# Patient Record
Sex: FEMALE | Race: OTHER | HISPANIC OR LATINO | URBAN - METROPOLITAN AREA
[De-identification: names, ages, dates, MRNs, and addresses within clinical notes are randomized per-mention and may not be internally consistent; named-entity substitution may affect disease eponyms.]

---

## 2018-09-02 ENCOUNTER — INPATIENT (INPATIENT)
Facility: HOSPITAL | Age: 73
LOS: 3 days | Discharge: HOME | End: 2018-09-06
Attending: INTERNAL MEDICINE | Admitting: INTERNAL MEDICINE
Payer: MEDICARE

## 2018-09-02 VITALS
RESPIRATION RATE: 16 BRPM | SYSTOLIC BLOOD PRESSURE: 101 MMHG | DIASTOLIC BLOOD PRESSURE: 56 MMHG | OXYGEN SATURATION: 96 % | HEART RATE: 68 BPM | TEMPERATURE: 99 F

## 2018-09-02 DIAGNOSIS — Z98.890 OTHER SPECIFIED POSTPROCEDURAL STATES: Chronic | ICD-10-CM

## 2018-09-02 DIAGNOSIS — I21.4 NON-ST ELEVATION (NSTEMI) MYOCARDIAL INFARCTION: ICD-10-CM

## 2018-09-02 DIAGNOSIS — Z29.9 ENCOUNTER FOR PROPHYLACTIC MEASURES, UNSPECIFIED: ICD-10-CM

## 2018-09-02 DIAGNOSIS — I10 ESSENTIAL (PRIMARY) HYPERTENSION: ICD-10-CM

## 2018-09-02 DIAGNOSIS — R07.9 CHEST PAIN, UNSPECIFIED: ICD-10-CM

## 2018-09-02 LAB
ALBUMIN SERPL ELPH-MCNC: 3.7 G/DL — SIGNIFICANT CHANGE UP (ref 3.3–5)
ALP SERPL-CCNC: 75 U/L — SIGNIFICANT CHANGE UP (ref 40–120)
ALT FLD-CCNC: 26 U/L — SIGNIFICANT CHANGE UP (ref 4–33)
APTT BLD: 28 SEC — SIGNIFICANT CHANGE UP (ref 27.5–37.4)
AST SERPL-CCNC: 41 U/L — HIGH (ref 4–32)
BASOPHILS # BLD AUTO: 0.04 K/UL — SIGNIFICANT CHANGE UP (ref 0–0.2)
BASOPHILS NFR BLD AUTO: 0.4 % — SIGNIFICANT CHANGE UP (ref 0–2)
BILIRUB SERPL-MCNC: 0.3 MG/DL — SIGNIFICANT CHANGE UP (ref 0.2–1.2)
BUN SERPL-MCNC: 17 MG/DL — SIGNIFICANT CHANGE UP (ref 7–23)
CALCIUM SERPL-MCNC: 9.4 MG/DL — SIGNIFICANT CHANGE UP (ref 8.4–10.5)
CHLORIDE SERPL-SCNC: 94 MMOL/L — LOW (ref 98–107)
CK MB BLD-MCNC: 18.99 NG/ML — HIGH (ref 1–4.7)
CK MB BLD-MCNC: 4.4 — HIGH (ref 0–2.5)
CK SERPL-CCNC: 435 U/L — HIGH (ref 25–170)
CO2 SERPL-SCNC: 20 MMOL/L — LOW (ref 22–31)
CREAT SERPL-MCNC: 0.76 MG/DL — SIGNIFICANT CHANGE UP (ref 0.5–1.3)
EOSINOPHIL # BLD AUTO: 0.07 K/UL — SIGNIFICANT CHANGE UP (ref 0–0.5)
EOSINOPHIL NFR BLD AUTO: 0.7 % — SIGNIFICANT CHANGE UP (ref 0–6)
GLUCOSE SERPL-MCNC: 107 MG/DL — HIGH (ref 70–99)
HCT VFR BLD CALC: 37 % — SIGNIFICANT CHANGE UP (ref 34.5–45)
HGB BLD-MCNC: 12.8 G/DL — SIGNIFICANT CHANGE UP (ref 11.5–15.5)
IMM GRANULOCYTES # BLD AUTO: 0.03 # — SIGNIFICANT CHANGE UP
IMM GRANULOCYTES NFR BLD AUTO: 0.3 % — SIGNIFICANT CHANGE UP (ref 0–1.5)
INR BLD: 0.98 — SIGNIFICANT CHANGE UP (ref 0.88–1.17)
LYMPHOCYTES # BLD AUTO: 2.38 K/UL — SIGNIFICANT CHANGE UP (ref 1–3.3)
LYMPHOCYTES # BLD AUTO: 25 % — SIGNIFICANT CHANGE UP (ref 13–44)
MCHC RBC-ENTMCNC: 32.2 PG — SIGNIFICANT CHANGE UP (ref 27–34)
MCHC RBC-ENTMCNC: 34.6 % — SIGNIFICANT CHANGE UP (ref 32–36)
MCV RBC AUTO: 93.2 FL — SIGNIFICANT CHANGE UP (ref 80–100)
MONOCYTES # BLD AUTO: 0.93 K/UL — HIGH (ref 0–0.9)
MONOCYTES NFR BLD AUTO: 9.8 % — SIGNIFICANT CHANGE UP (ref 2–14)
NEUTROPHILS # BLD AUTO: 6.07 K/UL — SIGNIFICANT CHANGE UP (ref 1.8–7.4)
NEUTROPHILS NFR BLD AUTO: 63.8 % — SIGNIFICANT CHANGE UP (ref 43–77)
NRBC # FLD: 0 — SIGNIFICANT CHANGE UP
PLATELET # BLD AUTO: 206 K/UL — SIGNIFICANT CHANGE UP (ref 150–400)
PMV BLD: 11.1 FL — SIGNIFICANT CHANGE UP (ref 7–13)
POTASSIUM SERPL-MCNC: 3.6 MMOL/L — SIGNIFICANT CHANGE UP (ref 3.5–5.3)
POTASSIUM SERPL-SCNC: 3.6 MMOL/L — SIGNIFICANT CHANGE UP (ref 3.5–5.3)
PROT SERPL-MCNC: 7.5 G/DL — SIGNIFICANT CHANGE UP (ref 6–8.3)
PROTHROM AB SERPL-ACNC: 11.3 SEC — SIGNIFICANT CHANGE UP (ref 9.8–13.1)
RBC # BLD: 3.97 M/UL — SIGNIFICANT CHANGE UP (ref 3.8–5.2)
RBC # FLD: 13.9 % — SIGNIFICANT CHANGE UP (ref 10.3–14.5)
SODIUM SERPL-SCNC: 128 MMOL/L — LOW (ref 135–145)
TROPONIN T, HIGH SENSITIVITY: 256 NG/L — CRITICAL HIGH (ref ?–14)
TROPONIN T, HIGH SENSITIVITY: 275 NG/L — CRITICAL HIGH (ref ?–14)
WBC # BLD: 9.52 K/UL — SIGNIFICANT CHANGE UP (ref 3.8–10.5)
WBC # FLD AUTO: 9.52 K/UL — SIGNIFICANT CHANGE UP (ref 3.8–10.5)

## 2018-09-02 PROCEDURE — 71045 X-RAY EXAM CHEST 1 VIEW: CPT | Mod: 26

## 2018-09-02 PROCEDURE — 93010 ELECTROCARDIOGRAM REPORT: CPT

## 2018-09-02 RX ORDER — ASPIRIN/CALCIUM CARB/MAGNESIUM 324 MG
162 TABLET ORAL ONCE
Refills: 0 | Status: COMPLETED | OUTPATIENT
Start: 2018-09-02 | End: 2018-09-02

## 2018-09-02 RX ORDER — LOSARTAN POTASSIUM 100 MG/1
50 TABLET, FILM COATED ORAL DAILY
Refills: 0 | Status: DISCONTINUED | OUTPATIENT
Start: 2018-09-02 | End: 2018-09-06

## 2018-09-02 RX ORDER — FOLIC ACID 0.8 MG
1 TABLET ORAL DAILY
Refills: 0 | Status: DISCONTINUED | OUTPATIENT
Start: 2018-09-02 | End: 2018-09-06

## 2018-09-02 RX ORDER — HEPARIN SODIUM 5000 [USP'U]/ML
4700 INJECTION INTRAVENOUS; SUBCUTANEOUS ONCE
Refills: 0 | Status: COMPLETED | OUTPATIENT
Start: 2018-09-02 | End: 2018-09-02

## 2018-09-02 RX ORDER — HEPARIN SODIUM 5000 [USP'U]/ML
INJECTION INTRAVENOUS; SUBCUTANEOUS
Qty: 25000 | Refills: 0 | Status: DISCONTINUED | OUTPATIENT
Start: 2018-09-02 | End: 2018-09-04

## 2018-09-02 RX ORDER — ACETAMINOPHEN 500 MG
650 TABLET ORAL EVERY 6 HOURS
Refills: 0 | Status: DISCONTINUED | OUTPATIENT
Start: 2018-09-02 | End: 2018-09-06

## 2018-09-02 RX ORDER — ACETAMINOPHEN 500 MG
975 TABLET ORAL ONCE
Refills: 0 | Status: DISCONTINUED | OUTPATIENT
Start: 2018-09-02 | End: 2018-09-02

## 2018-09-02 RX ORDER — TICAGRELOR 90 MG/1
180 TABLET ORAL ONCE
Refills: 0 | Status: COMPLETED | OUTPATIENT
Start: 2018-09-02 | End: 2018-09-02

## 2018-09-02 RX ORDER — HEPARIN SODIUM 5000 [USP'U]/ML
4700 INJECTION INTRAVENOUS; SUBCUTANEOUS EVERY 6 HOURS
Refills: 0 | Status: DISCONTINUED | OUTPATIENT
Start: 2018-09-02 | End: 2018-09-04

## 2018-09-02 RX ORDER — NITROGLYCERIN 6.5 MG
0.5 CAPSULE, EXTENDED RELEASE ORAL ONCE
Refills: 0 | Status: COMPLETED | OUTPATIENT
Start: 2018-09-02 | End: 2018-09-03

## 2018-09-02 RX ADMIN — HEPARIN SODIUM 4700 UNIT(S): 5000 INJECTION INTRAVENOUS; SUBCUTANEOUS at 17:19

## 2018-09-02 RX ADMIN — HEPARIN SODIUM 950 UNIT(S)/HR: 5000 INJECTION INTRAVENOUS; SUBCUTANEOUS at 17:19

## 2018-09-02 RX ADMIN — TICAGRELOR 180 MILLIGRAM(S): 90 TABLET ORAL at 18:40

## 2018-09-02 RX ADMIN — Medication 162 MILLIGRAM(S): at 16:54

## 2018-09-02 NOTE — ED PROVIDER NOTE - NS ED ROS FT
Constitutional: no fever  Eyes: no conjunctivitis  Ears: no ear pain   Nose: no nasal congestion, Mouth/Throat: no throat pain, Neck: no stiffness  Cardiovascular: + chest pain +sob   Chest: no cough  Gastrointestinal: no abdominal pain, no vomiting and diarrhea  MSK: no joint pain  : no dysuria  Skin: no rash  Neuro: no LOC

## 2018-09-02 NOTE — H&P ADULT - ASSESSMENT
71 y/o female with PMHx of HTN presenting to the ED with progressively worsening midsternal chest pressure on exertion for 3 weeks acutely worse last night. Admit to telemetry for NSTEMI.

## 2018-09-02 NOTE — CONSULT NOTE ADULT - SUBJECTIVE AND OBJECTIVE BOX
Date of Admission: 9/2/2018    CHIEF COMPLAINT: exertional chest pain X 3 weeks    HISTORY OF PRESENT ILLNESS: 72F with HTN presents with 3 weeks of exertional chest pain associated with intermittent dizziness. Patient has been experiencing chest pain with minimal exertion over last few weeks, climbing stairs, walking short distance. Pain is relieved with rest. Today her pain worsened and in addition to some dizziness she felt some associated L arm discomfort. In ER patient is painfree at rest but EKG with TWI in inferior and lateral leads (no EKG available for comparison). Troponin is also elevated to 275. Treated with  mg and Heparin gtt. Cardiology is consulted with concern for NSTEMI.      Allergies    No Known Allergies    Intolerances    	    MEDICATIONS:  heparin  Infusion.  Unit(s)/Hr IV Continuous <Continuous>  heparin  Injectable 4700 Unit(s) IV Push every 6 hours PRN  ticagrelor 180 milliGRAM(s) Oral once    PAST MEDICAL & SURGICAL HISTORY:  HTN    FAMILY HISTORY:      SOCIAL HISTORY:    Smoker  denies  Alcohol denies  Drugs denies      REVIEW OF SYSTEMS:  See HPI. Otherwise, 10 point ROS done and otherwise negative.    PHYSICAL EXAM:  T(C): 37.1 (09-02-18 @ 14:49), Max: 37.1 (09-02-18 @ 14:49)  HR: 70 (09-02-18 @ 15:49) (68 - 83)  BP: 126/69 (09-02-18 @ 15:49) (101/56 - 126/69)  RR: 18 (09-02-18 @ 15:49) (16 - 18)  SpO2: 99% (09-02-18 @ 15:49) (96% - 99%)  Wt(kg): --  I&O's Summary      Appearance: Normal	  HEENT:   Normal oral mucosa, PERRL, EOMI	  Cardiovascular: Normal S1 S2,  Respiratory: Lungs clear to auscultation	  Psychiatry: A & O x 3  Gastrointestinal:  Soft, Non-tender, + BS	  Skin: No rashes, No ecchymoses, No cyanosis	  Neurologic: Non-focal  Extremities: Normal range of motion, No clubbing, cyanosis or edema          LABS:	 	                        12.8   9.52  )-----------( 206      ( 02 Sep 2018 15:45 )             37.0       09-02    128<L>  |  94<L>  |  17  ----------------------------<  107<H>  3.6   |  20<L>  |  0.76    Ca    9.4      02 Sep 2018 15:45    TPro  7.5  /  Alb  3.7  /  TBili  0.3  /  DBili  x   /  AST  41<H>  /  ALT  26  /  AlkPhos  75  09-02      CARDIAC MARKERS:  Troponin T, High Sensitivity: 275 ng/L (09-02-18 @ 15:45)    TELEMETRY: 	NSR      ECG:  NSR with TWI inferior and lateral leads

## 2018-09-02 NOTE — H&P ADULT - HISTORY OF PRESENT ILLNESS
71 y/o female with PMHx of HTN presenting to the ED with progressively worsening midsternal chest pressure on exertion for 3 weeks acutely worse last night as she was walking up a flight of stairs. Patient reports that chest pain is only felt on exertion intermittently and is improved with rest. She took 325mg aspirin this morning with mild relief in symptoms. Pain radiates down left arm and is associated with lightheadedness. She denies fever, chills, headache, blurred vision, palpitations, shortness of breath, back pain, abdominal pain, nausea, vomiting, diaphoresis, dysuria, calf pain. Of note, patient reports feeling muscle cramping in b/l legs at night once in a while. Routine stress test was done 1.5 years ago which was unremarkable. Patient is currently lying in bed comfortably and denies chest pain.  In the ED, hsTrop noted to be elevated (275) and patient was started on a heparin gtt and ASA/Brilinta for concern for ACS. 73 y/o female with PMHx of HTN presenting to the ED with progressively worsening midsternal chest pressure on exertion for 3 weeks acutely worse last night as she was walking up a flight of stairs. Patient reports that chest pain is only felt on exertion intermittently and is improved with rest. She took 325mg aspirin this morning with mild relief in symptoms. Pain radiates down left arm and is associated with lightheadedness. She denies fever, chills, headache, blurred vision, palpitations, shortness of breath, back pain, abdominal pain, nausea, vomiting, diaphoresis, dysuria, calf pain. Of note, patient reports feeling muscle cramping in b/l legs at night once in a while. Routine stress test was done 1.5 years ago which was unremarkable. Patient is currently lying in bed comfortably and denies chest pain.  In the ED, EKG showing NSR @ 76bpm, TWI in inferior and lateral leads (no EKG available for comparison). hsTrop noted to be elevated (275) and patient was started on a heparin gtt and ASA/Brilinta for concern for ACS.

## 2018-09-02 NOTE — H&P ADULT - MUSCULOSKELETAL
ROM intact/no joint swelling/no joint erythema/no joint warmth/no calf tenderness detailed exam details…

## 2018-09-02 NOTE — H&P ADULT - PROBLEM SELECTOR PLAN 1
Admit to telemetry, serial cardiac enzymes, serial EKGs  Check CBC, CMP, TSH, FLP, HgA1C  Continue Losartan and HCTZ  F/U MD note Admit to telemetry, serial cardiac enzymes, serial EKGs  Check CBC, CMP, TSH, FLP, HgA1C  Echocardiogram ordered  Consider ischemic eval  Continue Losartan and HCTZ  F/U MD note

## 2018-09-02 NOTE — ED PROVIDER NOTE - PHYSICAL EXAMINATION
gen: well appearing  Mentation: AAO x 3  psych: mood appropriate  ENT: airway patent  Eyes: conjunctivae clear bilaterally  Cardio: RRR, no m/r/g  Resp: normal BS b/l  GI: s/nt/nd   Neuro: AAO x 3, sensation and motor function intact, CN 2-12 intact  Skin: No evidence of rash  MSK: normal movement of all extremities gen: well appearing  Mentation: AAO x 3  psych: mood appropriate  ENT: airway patent  Eyes: conjunctivae clear bilaterally  Cardio: RRR, no m/r/g  Resp: normal BS b/l  GI: s/nt/nd   Neuro: AAO x 3, sensation and motor function intact, CN 2-12 intact  Skin: No evidence of rash  MSK: normal movement of all extremities    ATTENDING PHYSICAL EXAM DR. Wilson ***GEN - NAD; A+O x3 ***HEAD - NC/AT ***EYES/NOSE - PERRL, EOMI, mucous membranes moist, no discharge ***THROAT: Oral cavity and pharynx normal. No inflammation, swelling, exudate, or lesions.  ***NECK: Neck supple, non-tender without lymphadenopathy, no masses, no JVD.   ***PULMONARY - CTA b/l, symmetric breath sounds. ***CARDIAC -s1s2, RRR, no M,R,G  ***ABDOMEN - +BS, ND, NT, soft, no guarding, no rebound, no masses   ***BACK - no CVA tenderness, Normal  spine ***EXTREMITIES - symmetric pulses, 2+ dp, capillary refill < 2 seconds, no clubbing, no cyanosis, no edema ***SKIN - no rash or bruising   ***NEUROLOGIC - alert, CN 2-12 intact

## 2018-09-02 NOTE — ED ADULT NURSE NOTE - NSIMPLEMENTINTERV_GEN_ALL_ED
Implemented All Universal Safety Interventions:  [place] to call system. Call bell, personal items and telephone within reach. Instruct patient to call for assistance. Room bathroom lighting operational. Non-slip footwear when patient is off stretcher. Physically safe environment: no spills, clutter or unnecessary equipment. Stretcher in lowest position, wheels locked, appropriate side rails in place.

## 2018-09-02 NOTE — ED PROVIDER NOTE - PROGRESS NOTE DETAILS
Kelsie att: 16:00 Signout to me. 16:40 Trop 275, neg active cp, vss, nstemi. Patient denies recent gi bleed or surgery. Hep gtt initiated. Cardio fellow consulted, will see, recommends admission to medicine.

## 2018-09-02 NOTE — ED ADULT NURSE NOTE - OBJECTIVE STATEMENT
Kyrie RN: received pt to room 4 for evaluation of midsternal non-radiating chest pressure x 3 weeks with dizziness and sob on exertion which worsened last night per pt. pt presents awake a&ox4, denies dizziness or ha at present. skin warm, dry, appropriate for race. respirations even, unlabored. denies cp or sob at present. abdomen soft nontender nondistended. denies n/v/d, denies fevers or chills. ivl placed bloods drawn and sent, cardiac monitor in place in nsr. family at bedside, safety maintained. report to primary RN Maicol

## 2018-09-02 NOTE — H&P ADULT - ATTENDING COMMENTS
72 year old woman with HTN presents with chest pain found to have NSTEMI.    #NSTEMI-  EKG is sinus with diffuse t-wave inversions and isolated ST elevation in lead V2.  Intermittent ventricular bigeminy on telemetry.  Repeat EKG please.   S/P ASA and Brilinta load with heparin gtt.  Will plan for cardiac cath on 9/4 unless pain is refractory to medical therapy.

## 2018-09-02 NOTE — CONSULT NOTE ADULT - ASSESSMENT
72F with HTN presents with exertional angina and associated elevated troponins, concern for NSTEMI.    D/W Dr. Zapata, cardio fellow would admit to telemetry, ACS protocol with DAPT (ASA and Brilinta), Heparin gtt. Continue to trend cardiac enzymes and serial EKGs. Check ECHO in am. Risk factor labs. Will plan for cardiac cath on Tuesday. If pain pattern changes would need to consider more urgent intervention.

## 2018-09-02 NOTE — ED PROVIDER NOTE - MEDICAL DECISION MAKING DETAILS
73 yo female presenting with chest pain; concern for acs; will obtain labs ekg cxr pain control ---> re eval

## 2018-09-02 NOTE — ED PROVIDER NOTE - OBJECTIVE STATEMENT
73 yo female presenting with progressively worsening midsternal chest pressure for 3 weeks acutely worse last night.  Worse with exertion, somewhat improved with rest.  Took 325 aspirin this morning with mild relief in symptoms.  Pain associated with dizziness and shortness of breath.  Stress test 1.5 years ago which was unremarkable.  Currently states that she does not have any pain. 71 yo female presenting with progressively worsening midsternal chest pressure for 3 weeks acutely worse last night.  Worse with exertion, somewhat improved with rest.  Took 325 aspirin this morning with mild relief in symptoms.  Pain associated with dizziness and shortness of breath.  Stress test 1.5 years ago which was unremarkable.  Currently states that she does not have any pain.  Attending - Agree with above.  I evaluated patient myself.  73 y/o F with on/off SSCP x 3 weeks, constant since last night.  Radiates to LUE.  Worse with exertion.  Associated shortness of breathe.  No recent fever, cough, or trauma.  No abd pain, n/v.  Had similar in distant past, but did not last as long.

## 2018-09-02 NOTE — H&P ADULT - RS GEN PE MLT RESP DETAILS PC
airway patent/breath sounds equal/good air movement/respirations non-labored/clear to auscultation bilaterally/no chest wall tenderness/no intercostal retractions/no rales/no rhonchi/no wheezes

## 2018-09-03 LAB
APTT BLD: 60.4 SEC — HIGH (ref 27.5–37.4)
APTT BLD: 63.7 SEC — HIGH (ref 27.5–37.4)
BUN SERPL-MCNC: 13 MG/DL — SIGNIFICANT CHANGE UP (ref 7–23)
CALCIUM SERPL-MCNC: 9 MG/DL — SIGNIFICANT CHANGE UP (ref 8.4–10.5)
CHLORIDE SERPL-SCNC: 99 MMOL/L — SIGNIFICANT CHANGE UP (ref 98–107)
CHOLEST SERPL-MCNC: 178 MG/DL — SIGNIFICANT CHANGE UP (ref 120–199)
CK MB BLD-MCNC: 15.9 NG/ML — HIGH (ref 1–4.7)
CK MB BLD-MCNC: 4.7 — HIGH (ref 0–2.5)
CK SERPL-CCNC: 335 U/L — HIGH (ref 25–170)
CK SERPL-CCNC: 347 U/L — HIGH (ref 25–170)
CO2 SERPL-SCNC: 20 MMOL/L — LOW (ref 22–31)
CREAT SERPL-MCNC: 0.72 MG/DL — SIGNIFICANT CHANGE UP (ref 0.5–1.3)
GLUCOSE SERPL-MCNC: 123 MG/DL — HIGH (ref 70–99)
HBA1C BLD-MCNC: 5.8 % — HIGH (ref 4–5.6)
HCT VFR BLD CALC: 36.4 % — SIGNIFICANT CHANGE UP (ref 34.5–45)
HCT VFR BLD CALC: 38.1 % — SIGNIFICANT CHANGE UP (ref 34.5–45)
HDLC SERPL-MCNC: 43 MG/DL — LOW (ref 45–65)
HGB BLD-MCNC: 12.4 G/DL — SIGNIFICANT CHANGE UP (ref 11.5–15.5)
HGB BLD-MCNC: 13 G/DL — SIGNIFICANT CHANGE UP (ref 11.5–15.5)
LIPID PNL WITH DIRECT LDL SERPL: 109 MG/DL — SIGNIFICANT CHANGE UP
MAGNESIUM SERPL-MCNC: 2.1 MG/DL — SIGNIFICANT CHANGE UP (ref 1.6–2.6)
MCHC RBC-ENTMCNC: 31.3 PG — SIGNIFICANT CHANGE UP (ref 27–34)
MCHC RBC-ENTMCNC: 31.5 PG — SIGNIFICANT CHANGE UP (ref 27–34)
MCHC RBC-ENTMCNC: 34.1 % — SIGNIFICANT CHANGE UP (ref 32–36)
MCHC RBC-ENTMCNC: 34.1 % — SIGNIFICANT CHANGE UP (ref 32–36)
MCV RBC AUTO: 91.8 FL — SIGNIFICANT CHANGE UP (ref 80–100)
MCV RBC AUTO: 92.4 FL — SIGNIFICANT CHANGE UP (ref 80–100)
NRBC # FLD: 0 — SIGNIFICANT CHANGE UP
NRBC # FLD: 0 — SIGNIFICANT CHANGE UP
PLATELET # BLD AUTO: 187 K/UL — SIGNIFICANT CHANGE UP (ref 150–400)
PLATELET # BLD AUTO: 190 K/UL — SIGNIFICANT CHANGE UP (ref 150–400)
PMV BLD: 11.6 FL — SIGNIFICANT CHANGE UP (ref 7–13)
PMV BLD: 11.6 FL — SIGNIFICANT CHANGE UP (ref 7–13)
POTASSIUM SERPL-MCNC: 3.5 MMOL/L — SIGNIFICANT CHANGE UP (ref 3.5–5.3)
POTASSIUM SERPL-SCNC: 3.5 MMOL/L — SIGNIFICANT CHANGE UP (ref 3.5–5.3)
RBC # BLD: 3.94 M/UL — SIGNIFICANT CHANGE UP (ref 3.8–5.2)
RBC # BLD: 4.15 M/UL — SIGNIFICANT CHANGE UP (ref 3.8–5.2)
RBC # FLD: 13.5 % — SIGNIFICANT CHANGE UP (ref 10.3–14.5)
RBC # FLD: 13.7 % — SIGNIFICANT CHANGE UP (ref 10.3–14.5)
SODIUM SERPL-SCNC: 135 MMOL/L — SIGNIFICANT CHANGE UP (ref 135–145)
TRIGL SERPL-MCNC: 174 MG/DL — HIGH (ref 10–149)
TROPONIN T, HIGH SENSITIVITY: 202 NG/L — CRITICAL HIGH (ref ?–14)
TROPONIN T, HIGH SENSITIVITY: 223 NG/L — CRITICAL HIGH (ref ?–14)
TSH SERPL-MCNC: 2.97 UIU/ML — SIGNIFICANT CHANGE UP (ref 0.27–4.2)
WBC # BLD: 7.15 K/UL — SIGNIFICANT CHANGE UP (ref 3.8–10.5)
WBC # BLD: 8.49 K/UL — SIGNIFICANT CHANGE UP (ref 3.8–10.5)
WBC # FLD AUTO: 7.15 K/UL — SIGNIFICANT CHANGE UP (ref 3.8–10.5)
WBC # FLD AUTO: 8.49 K/UL — SIGNIFICANT CHANGE UP (ref 3.8–10.5)

## 2018-09-03 PROCEDURE — 93010 ELECTROCARDIOGRAM REPORT: CPT

## 2018-09-03 RX ORDER — TICAGRELOR 90 MG/1
90 TABLET ORAL
Refills: 0 | Status: DISCONTINUED | OUTPATIENT
Start: 2018-09-03 | End: 2018-09-04

## 2018-09-03 RX ORDER — ATORVASTATIN CALCIUM 80 MG/1
40 TABLET, FILM COATED ORAL AT BEDTIME
Refills: 0 | Status: DISCONTINUED | OUTPATIENT
Start: 2018-09-03 | End: 2018-09-06

## 2018-09-03 RX ORDER — MORPHINE SULFATE 50 MG/1
0.5 CAPSULE, EXTENDED RELEASE ORAL ONCE
Refills: 0 | Status: DISCONTINUED | OUTPATIENT
Start: 2018-09-03 | End: 2018-09-03

## 2018-09-03 RX ORDER — ASPIRIN/CALCIUM CARB/MAGNESIUM 324 MG
81 TABLET ORAL DAILY
Refills: 0 | Status: DISCONTINUED | OUTPATIENT
Start: 2018-09-03 | End: 2018-09-06

## 2018-09-03 RX ADMIN — Medication 1 MILLIGRAM(S): at 13:41

## 2018-09-03 RX ADMIN — Medication 0.5 INCH(S): at 01:17

## 2018-09-03 RX ADMIN — Medication 30 MILLILITER(S): at 01:17

## 2018-09-03 RX ADMIN — Medication 0.5 INCH(S): at 13:19

## 2018-09-03 RX ADMIN — TICAGRELOR 90 MILLIGRAM(S): 90 TABLET ORAL at 18:45

## 2018-09-03 RX ADMIN — MORPHINE SULFATE 0.5 MILLIGRAM(S): 50 CAPSULE, EXTENDED RELEASE ORAL at 10:58

## 2018-09-03 RX ADMIN — Medication 650 MILLIGRAM(S): at 02:30

## 2018-09-03 RX ADMIN — Medication 1 DROP(S): at 18:44

## 2018-09-03 RX ADMIN — HEPARIN SODIUM 950 UNIT(S)/HR: 5000 INJECTION INTRAVENOUS; SUBCUTANEOUS at 01:56

## 2018-09-03 RX ADMIN — LOSARTAN POTASSIUM 50 MILLIGRAM(S): 100 TABLET, FILM COATED ORAL at 13:40

## 2018-09-03 RX ADMIN — HEPARIN SODIUM 950 UNIT(S)/HR: 5000 INJECTION INTRAVENOUS; SUBCUTANEOUS at 08:33

## 2018-09-03 RX ADMIN — Medication 1 TABLET(S): at 13:40

## 2018-09-03 RX ADMIN — MORPHINE SULFATE 0.5 MILLIGRAM(S): 50 CAPSULE, EXTENDED RELEASE ORAL at 10:43

## 2018-09-03 RX ADMIN — Medication 650 MILLIGRAM(S): at 01:17

## 2018-09-03 RX ADMIN — Medication 81 MILLIGRAM(S): at 13:40

## 2018-09-03 RX ADMIN — Medication 1 DROP(S): at 06:23

## 2018-09-03 RX ADMIN — ATORVASTATIN CALCIUM 40 MILLIGRAM(S): 80 TABLET, FILM COATED ORAL at 20:49

## 2018-09-03 NOTE — PROVIDER CONTACT NOTE (OTHER) - SITUATION
Patient states that she started to feel come chest pressure and mild chest pain immediately before being transferred to [address] [place] [address]. Describes pain as similar to what brought her in. EKG done upon arrival
pt c/o 10/10 chest pain

## 2018-09-03 NOTE — DIETITIAN INITIAL EVALUATION ADULT. - ENERGY NEEDS
Weight: 169# (77.1kg) (9/02)  Height: 62 inches  BMI: 31.1kg/m^2  IBW: 110# (50kg) +/-10%  No pressure ulcers/DTI noted per flowsheets. No edema noted.

## 2018-09-03 NOTE — CHART NOTE - NSCHARTNOTEFT_GEN_A_CORE
Called by RN pt c/o Chest pressure, Pt seen appears comfortable NAD, states she still having same Chest pressure that she had since she came in to hospital, Chest [facility]  [facility] stable, no events on tele, EKG unchanged. Pt already on Heparin qtt, will order NTG 1/2 inch to ACW, Tylenol prn, maalox prn, repeat CE w/ a labs., cardio to f/up for possible ischemic w/up Called by RN pt c/o Chest pressure, Pt seen appears comfortable NAD, states she still having same Chest pressure that she had since she came in to hospital, Chest pressure [facility]  [facility] VS stable, no events on tele, EKG unchanged. Pt already on Heparin qtt, will order NTG 1/2 inch to ACW, Tylenol prn, maalox prn, repeat CE w/ a labs., cardio to f/up for cardiac cath.

## 2018-09-03 NOTE — PROVIDER CONTACT NOTE (OTHER) - ACTION/TREATMENT ORDERED:
Ask if pain would like pain medication.    (Post provider evaluation of patient: administer Tylenol, Maalox, nitroglycerin ointment, and place of 2 liters of oxygen by nasal cannula.)
EKG, troponins,  NS 2L, 0.5mg of morphine to alleviate pain

## 2018-09-03 NOTE — DIETITIAN INITIAL EVALUATION ADULT. - NS AS NUTRI INTERV ED CONTENT
The Pt was provided with Heart Healthy diet education (low sodium, low saturated fat, increased vegetable intake) which patient verbalized comprehension of.

## 2018-09-03 NOTE — DIETITIAN INITIAL EVALUATION ADULT. - PROBLEM SELECTOR PLAN 1
Admit to telemetry, serial cardiac enzymes, serial EKGs  Check CBC, CMP, TSH, FLP, HgA1C  Echocardiogram ordered  Consider ischemic eval  Continue Losartan and HCTZ  F/U MD note

## 2018-09-03 NOTE — DIETITIAN INITIAL EVALUATION ADULT. - DIET TYPE
low sodium/consistent carbohydrate (no snacks)/DASH/TLC (sodium and cholesterol restricted diet)/regular/low fat

## 2018-09-03 NOTE — DIETITIAN INITIAL EVALUATION ADULT. - ADHERENCE
HbA1c 5.8% (9/03) - at risk for DM2, patient reports not following any diet restrictions/modifications/fair

## 2018-09-03 NOTE — DIETITIAN INITIAL EVALUATION ADULT. - NS AS NUTRI INTERV MEALS SNACK
1. Recommend Consistent Carbohydrate, DASH/TLC (cholesterol and sodium restricted) diet. 2. Please Encourage po intake, assist with meals and menu selections, provide alternatives PRN. 3. Consider multivitamin daily for micronutrient coverage. 4. Monitor weights, labs, BM's, skin integrity, p.o. intake.

## 2018-09-03 NOTE — DIETITIAN INITIAL EVALUATION ADULT. - SOURCE
Patient Malaysian speaking, utilized  service to conduct interview.  ID#[id number]. Medical record reviewed./patient

## 2018-09-03 NOTE — DIETITIAN INITIAL EVALUATION ADULT. - OTHER INFO
Patient seen for nutrition consult (assessment, education). Per chart review patient with medical history of HTN presenting to the ED with progressively worsening midsternal chest pressure on exertion for 3 weeks. Admit to telemetry for NSTEMI. Patient reports good appetite/PO intake PTA which continues in-house. NKFA. Patient does report not following diet modifications and "eats everything." Patient's HbA1c 5.8% indicating risk for DM2. Patient occastionally drinks juice but mainly water, no soda. Reports over the summer she has been eating at "[employer] and having ice cream. No reported change in weight PTA. Provided education regarding heart healthy diet and written materials in [place] provided. Patient amenable to education and verbalized understanding. No GI distress (nausea/vomiting/diarrhea/constipation.)

## 2018-09-04 LAB
APTT BLD: 27.5 SEC — SIGNIFICANT CHANGE UP (ref 27.5–37.4)
APTT BLD: 48.4 SEC — HIGH (ref 27.5–37.4)
BUN SERPL-MCNC: 16 MG/DL — SIGNIFICANT CHANGE UP (ref 7–23)
CALCIUM SERPL-MCNC: 9.2 MG/DL — SIGNIFICANT CHANGE UP (ref 8.4–10.5)
CHLORIDE SERPL-SCNC: 99 MMOL/L — SIGNIFICANT CHANGE UP (ref 98–107)
CO2 SERPL-SCNC: 19 MMOL/L — LOW (ref 22–31)
CREAT SERPL-MCNC: 0.78 MG/DL — SIGNIFICANT CHANGE UP (ref 0.5–1.3)
GLUCOSE SERPL-MCNC: 126 MG/DL — HIGH (ref 70–99)
HCT VFR BLD CALC: 37.1 % — SIGNIFICANT CHANGE UP (ref 34.5–45)
HCT VFR BLD CALC: 37.6 % — SIGNIFICANT CHANGE UP (ref 34.5–45)
HGB BLD-MCNC: 12.8 G/DL — SIGNIFICANT CHANGE UP (ref 11.5–15.5)
HGB BLD-MCNC: 12.9 G/DL — SIGNIFICANT CHANGE UP (ref 11.5–15.5)
MAGNESIUM SERPL-MCNC: 2.1 MG/DL — SIGNIFICANT CHANGE UP (ref 1.6–2.6)
MCHC RBC-ENTMCNC: 32.3 PG — SIGNIFICANT CHANGE UP (ref 27–34)
MCHC RBC-ENTMCNC: 32.6 PG — SIGNIFICANT CHANGE UP (ref 27–34)
MCHC RBC-ENTMCNC: 34.3 % — SIGNIFICANT CHANGE UP (ref 32–36)
MCHC RBC-ENTMCNC: 34.5 % — SIGNIFICANT CHANGE UP (ref 32–36)
MCV RBC AUTO: 94 FL — SIGNIFICANT CHANGE UP (ref 80–100)
MCV RBC AUTO: 94.4 FL — SIGNIFICANT CHANGE UP (ref 80–100)
NRBC # FLD: 0 — SIGNIFICANT CHANGE UP
NRBC # FLD: 0 — SIGNIFICANT CHANGE UP
PLATELET # BLD AUTO: 177 K/UL — SIGNIFICANT CHANGE UP (ref 150–400)
PLATELET # BLD AUTO: 191 K/UL — SIGNIFICANT CHANGE UP (ref 150–400)
PMV BLD: 11 FL — SIGNIFICANT CHANGE UP (ref 7–13)
PMV BLD: 11.7 FL — SIGNIFICANT CHANGE UP (ref 7–13)
POTASSIUM SERPL-MCNC: 3.5 MMOL/L — SIGNIFICANT CHANGE UP (ref 3.5–5.3)
POTASSIUM SERPL-SCNC: 3.5 MMOL/L — SIGNIFICANT CHANGE UP (ref 3.5–5.3)
RBC # BLD: 3.93 M/UL — SIGNIFICANT CHANGE UP (ref 3.8–5.2)
RBC # BLD: 4 M/UL — SIGNIFICANT CHANGE UP (ref 3.8–5.2)
RBC # FLD: 13.9 % — SIGNIFICANT CHANGE UP (ref 10.3–14.5)
RBC # FLD: 13.9 % — SIGNIFICANT CHANGE UP (ref 10.3–14.5)
SODIUM SERPL-SCNC: 135 MMOL/L — SIGNIFICANT CHANGE UP (ref 135–145)
WBC # BLD: 7.07 K/UL — SIGNIFICANT CHANGE UP (ref 3.8–10.5)
WBC # BLD: 7.79 K/UL — SIGNIFICANT CHANGE UP (ref 3.8–10.5)
WBC # FLD AUTO: 7.07 K/UL — SIGNIFICANT CHANGE UP (ref 3.8–10.5)
WBC # FLD AUTO: 7.79 K/UL — SIGNIFICANT CHANGE UP (ref 3.8–10.5)

## 2018-09-04 PROCEDURE — 93458 L HRT ARTERY/VENTRICLE ANGIO: CPT | Mod: 26

## 2018-09-04 PROCEDURE — 76937 US GUIDE VASCULAR ACCESS: CPT | Mod: 26

## 2018-09-04 PROCEDURE — 93010 ELECTROCARDIOGRAM REPORT: CPT

## 2018-09-04 RX ORDER — METOPROLOL TARTRATE 50 MG
12.5 TABLET ORAL
Refills: 0 | Status: DISCONTINUED | OUTPATIENT
Start: 2018-09-04 | End: 2018-09-04

## 2018-09-04 RX ORDER — SODIUM CHLORIDE 9 MG/ML
250 INJECTION INTRAMUSCULAR; INTRAVENOUS; SUBCUTANEOUS ONCE
Refills: 0 | Status: COMPLETED | OUTPATIENT
Start: 2018-09-04 | End: 2018-09-04

## 2018-09-04 RX ORDER — METOPROLOL TARTRATE 50 MG
25 TABLET ORAL
Refills: 0 | Status: DISCONTINUED | OUTPATIENT
Start: 2018-09-04 | End: 2018-09-06

## 2018-09-04 RX ORDER — METOPROLOL TARTRATE 50 MG
25 TABLET ORAL
Refills: 0 | Status: DISCONTINUED | OUTPATIENT
Start: 2018-09-04 | End: 2018-09-04

## 2018-09-04 RX ORDER — METOPROLOL TARTRATE 50 MG
12.5 TABLET ORAL ONCE
Refills: 0 | Status: COMPLETED | OUTPATIENT
Start: 2018-09-04 | End: 2018-09-04

## 2018-09-04 RX ADMIN — Medication 1 DROP(S): at 06:31

## 2018-09-04 RX ADMIN — TICAGRELOR 90 MILLIGRAM(S): 90 TABLET ORAL at 06:30

## 2018-09-04 RX ADMIN — Medication 12.5 MILLIGRAM(S): at 15:37

## 2018-09-04 RX ADMIN — Medication 650 MILLIGRAM(S): at 14:49

## 2018-09-04 RX ADMIN — LOSARTAN POTASSIUM 50 MILLIGRAM(S): 100 TABLET, FILM COATED ORAL at 06:30

## 2018-09-04 RX ADMIN — HEPARIN SODIUM 1100 UNIT(S)/HR: 5000 INJECTION INTRAVENOUS; SUBCUTANEOUS at 07:36

## 2018-09-04 RX ADMIN — ATORVASTATIN CALCIUM 40 MILLIGRAM(S): 80 TABLET, FILM COATED ORAL at 21:16

## 2018-09-04 RX ADMIN — Medication 650 MILLIGRAM(S): at 15:19

## 2018-09-04 RX ADMIN — Medication 25 MILLIGRAM(S): at 21:16

## 2018-09-04 RX ADMIN — Medication 1 TABLET(S): at 18:02

## 2018-09-04 RX ADMIN — Medication 1 MILLIGRAM(S): at 18:02

## 2018-09-04 RX ADMIN — SODIUM CHLORIDE 750 MILLILITER(S): 9 INJECTION INTRAMUSCULAR; INTRAVENOUS; SUBCUTANEOUS at 15:05

## 2018-09-04 RX ADMIN — Medication 81 MILLIGRAM(S): at 09:05

## 2018-09-04 RX ADMIN — Medication 1 DROP(S): at 18:02

## 2018-09-04 NOTE — CHART NOTE - NSCHARTNOTEFT_GEN_A_CORE
Patient is s/p cardiac cath. Patient without any complaints. site is stable. No hematoma. Dressing is clean/intact/dry. good Pedal pulse..  will monitor closely.

## 2018-09-04 NOTE — PROGRESS NOTE ADULT - SUBJECTIVE AND OBJECTIVE BOX
Cardiovascular Disease Progress Note    Overnight events: Chest pain yesterday evening noted. Chest pain now improved.    Otherwise review of systems negative    Objective Findings:  T(C): 36.8 (18 @ 06:28), Max: 37 (18 @ 10:00)  HR: 64 (18 @ 06:28) (64 - 85)  BP: 103/57 (18 @ 06:28) (98/67 - 109/60)  RR: 18 (18 @ 06:28) (18 - 18)  SpO2: 100% (18 @ 06:28) (95% - 100%)  Wt(kg): --  Daily     Daily Weight in k.8 (03 Sep 2018 11:11)      Physical Exam:  Gen: NAD  HEENT: EOMI  CV: RRR, normal S1 + S2, no m/r/g  Lungs: CTAB  Abd: soft, non-tender  Ext: No edema    Telemetry: Sinus; occasional PVDs    Laboratory Data:                        12.8   7.79  )-----------( 191      ( 04 Sep 2018 05:25 )             37.1     09-    135  |  99  |  13  ----------------------------<  123<H>  3.5   |  20<L>  |  0.72    Ca    9.0      03 Sep 2018 05:37  Mg     2.1         TPro  7.5  /  Alb  3.7  /  TBili  0.3  /  DBili  x   /  AST  41<H>  /  ALT  26  /  AlkPhos  75  09-02    PT/INR - ( 02 Sep 2018 15:45 )   PT: 11.3 SEC;   INR: 0.98          PTT - ( 04 Sep 2018 05:25 )  PTT:48.4 SEC  CARDIAC MARKERS ( 03 Sep 2018 10:29 )  x     / x     / 335 u/L / 15.90 ng/mL / x      CARDIAC MARKERS ( 03 Sep 2018 05:37 )  x     / x     / 347 u/L / x     / x      CARDIAC MARKERS ( 02 Sep 2018 20:00 )  x     / x     / 435 u/L / 18.99 ng/mL / x              Inpatient Medications:  MEDICATIONS  (STANDING):  artificial tears (preservative free) Ophthalmic Solution 1 Drop(s) Both EYES two times a day  aspirin  chewable 81 milliGRAM(s) Oral daily  atorvastatin 40 milliGRAM(s) Oral at bedtime  folic acid 1 milliGRAM(s) Oral daily  heparin  Infusion.  Unit(s)/Hr (9.5 mL/Hr) IV Continuous <Continuous>  hydrochlorothiazide 12.5 milliGRAM(s) Oral daily  losartan 50 milliGRAM(s) Oral daily  multivitamin 1 Tablet(s) Oral daily  ticagrelor 90 milliGRAM(s) Oral two times a day      Assessment: 72 year old woman with HTN presents with chest pain found to have NSTEMI.    #NSTEMI-  Recurrent chest pain overnight, but now improved.  Repeat EKG is sinus with diffuse t-wave inversions and grossly unchanged from admission.   S/P ASA and Brilinta load with heparin gtt.  Plan for cardiac cath today.      Care discussed with patient and grandson, via telephone.     Over 25 minutes spent on total encounter; more than 50% of the visit was spent counseling and/or coordinating care by the attending physician.      Jame Watkins MD [facility]  Cardiovascular Disease  [phone]

## 2018-09-04 NOTE — CHART NOTE - NSCHARTNOTEFT_GEN_A_CORE
Pt s/p cath today    Right femoral artery site stable, no swelling, dressing intact, foot warm to touch    Plan: continue to monitor

## 2018-09-05 ENCOUNTER — TRANSCRIPTION ENCOUNTER (OUTPATIENT)
Age: 73
End: 2018-09-05

## 2018-09-05 LAB
BUN SERPL-MCNC: 15 MG/DL — SIGNIFICANT CHANGE UP (ref 7–23)
CALCIUM SERPL-MCNC: 9.3 MG/DL — SIGNIFICANT CHANGE UP (ref 8.4–10.5)
CHLORIDE SERPL-SCNC: 99 MMOL/L — SIGNIFICANT CHANGE UP (ref 98–107)
CO2 SERPL-SCNC: 20 MMOL/L — LOW (ref 22–31)
CREAT SERPL-MCNC: 0.79 MG/DL — SIGNIFICANT CHANGE UP (ref 0.5–1.3)
GLUCOSE SERPL-MCNC: 119 MG/DL — HIGH (ref 70–99)
HCT VFR BLD CALC: 37.3 % — SIGNIFICANT CHANGE UP (ref 34.5–45)
HGB BLD-MCNC: 12.8 G/DL — SIGNIFICANT CHANGE UP (ref 11.5–15.5)
MAGNESIUM SERPL-MCNC: 2.2 MG/DL — SIGNIFICANT CHANGE UP (ref 1.6–2.6)
MCHC RBC-ENTMCNC: 32.2 PG — SIGNIFICANT CHANGE UP (ref 27–34)
MCHC RBC-ENTMCNC: 34.3 % — SIGNIFICANT CHANGE UP (ref 32–36)
MCV RBC AUTO: 94 FL — SIGNIFICANT CHANGE UP (ref 80–100)
NRBC # FLD: 0 — SIGNIFICANT CHANGE UP
PLATELET # BLD AUTO: 189 K/UL — SIGNIFICANT CHANGE UP (ref 150–400)
PMV BLD: 11.6 FL — SIGNIFICANT CHANGE UP (ref 7–13)
POTASSIUM SERPL-MCNC: 3.7 MMOL/L — SIGNIFICANT CHANGE UP (ref 3.5–5.3)
POTASSIUM SERPL-SCNC: 3.7 MMOL/L — SIGNIFICANT CHANGE UP (ref 3.5–5.3)
RBC # BLD: 3.97 M/UL — SIGNIFICANT CHANGE UP (ref 3.8–5.2)
RBC # FLD: 13.9 % — SIGNIFICANT CHANGE UP (ref 10.3–14.5)
SODIUM SERPL-SCNC: 132 MMOL/L — LOW (ref 135–145)
WBC # BLD: 7.96 K/UL — SIGNIFICANT CHANGE UP (ref 3.8–10.5)
WBC # FLD AUTO: 7.96 K/UL — SIGNIFICANT CHANGE UP (ref 3.8–10.5)

## 2018-09-05 PROCEDURE — 93306 TTE W/DOPPLER COMPLETE: CPT | Mod: 26

## 2018-09-05 RX ORDER — INFLUENZA VIRUS VACCINE 15; 15; 15; 15 UG/.5ML; UG/.5ML; UG/.5ML; UG/.5ML
0.5 SUSPENSION INTRAMUSCULAR ONCE
Refills: 0 | Status: DISCONTINUED | OUTPATIENT
Start: 2018-09-05 | End: 2018-09-06

## 2018-09-05 RX ADMIN — Medication 25 MILLIGRAM(S): at 18:39

## 2018-09-05 RX ADMIN — Medication 81 MILLIGRAM(S): at 12:38

## 2018-09-05 RX ADMIN — Medication 1 TABLET(S): at 12:38

## 2018-09-05 RX ADMIN — LOSARTAN POTASSIUM 50 MILLIGRAM(S): 100 TABLET, FILM COATED ORAL at 06:34

## 2018-09-05 RX ADMIN — Medication 1 DROP(S): at 06:34

## 2018-09-05 RX ADMIN — Medication 1 MILLIGRAM(S): at 12:38

## 2018-09-05 RX ADMIN — Medication 650 MILLIGRAM(S): at 11:18

## 2018-09-05 RX ADMIN — Medication 650 MILLIGRAM(S): at 12:37

## 2018-09-05 RX ADMIN — Medication 1 DROP(S): at 18:39

## 2018-09-05 RX ADMIN — ATORVASTATIN CALCIUM 40 MILLIGRAM(S): 80 TABLET, FILM COATED ORAL at 21:51

## 2018-09-05 NOTE — PROGRESS NOTE ADULT - SUBJECTIVE AND OBJECTIVE BOX
Cardiovascular Disease Progress Note    Overnight events: No acute events overnight. Ms. Rodriguez continues to have intermittent chest pain.   Otherwise review of systems negative    Objective Findings:  T(C): 36.4 (09-05-18 @ 06:33), Max: 36.7 (09-04-18 @ 17:35)  HR: 61 (09-05-18 @ 06:33) (61 - 83)  BP: 103/63 (09-05-18 @ 06:33) (98/65 - 121/67)  RR: 18 (09-05-18 @ 06:33) (16 - 18)  SpO2: 98% (09-05-18 @ 06:33) (96% - 100%)  Wt(kg): --  Daily     Daily       Physical Exam:  Gen: NAD  HEENT: EOMI  CV: RRR, normal S1 + S2, no m/r/g  Lungs: CTAB  Abd: soft, non-tender  Ext: No edema    Telemetry: Sinus; occasional bigeminy    Laboratory Data:                        12.8   7.96  )-----------( 189      ( 05 Sep 2018 05:45 )             37.3     09-05    132<L>  |  99  |  15  ----------------------------<  119<H>  3.7   |  20<L>  |  0.79    Ca    9.3      05 Sep 2018 05:45  Mg     2.2     09-05      PTT - ( 04 Sep 2018 13:55 )  PTT:27.5 SEC  CARDIAC MARKERS ( 03 Sep 2018 10:29 )  x     / x     / 335 u/L / 15.90 ng/mL / x              Inpatient Medications:  MEDICATIONS  (STANDING):  artificial tears (preservative free) Ophthalmic Solution 1 Drop(s) Both EYES two times a day  aspirin  chewable 81 milliGRAM(s) Oral daily  atorvastatin 40 milliGRAM(s) Oral at bedtime  folic acid 1 milliGRAM(s) Oral daily  hydrochlorothiazide 12.5 milliGRAM(s) Oral daily  influenza   Vaccine 0.5 milliLiter(s) IntraMuscular once  losartan 50 milliGRAM(s) Oral daily  metoprolol tartrate 25 milliGRAM(s) Oral two times a day  multivitamin 1 Tablet(s) Oral daily      Assessment: 72 year old woman with HTN presents with chest pain found to have NSTEMI.    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  Possibly stress induced cardiomyopathy, as apical ballooning seen on LV gram.  Stop Brilinta.  Obtain TTE to better assess LV function.    Over 25 minutes spent on total encounter; more than 50% of the visit was spent counseling and/or coordinating care by the attending physician.      Jame Watkins MD [facility]  Cardiovascular Disease  [phone] Cardiovascular Disease Progress Note    Overnight events: No acute events overnight. Ms. Rodriguez continues to have intermittent chest pain.   Otherwise review of systems negative    Objective Findings:  T(C): 36.4 (09-05-18 @ 06:33), Max: 36.7 (09-04-18 @ 17:35)  HR: 61 (09-05-18 @ 06:33) (61 - 83)  BP: 103/63 (09-05-18 @ 06:33) (98/65 - 121/67)  RR: 18 (09-05-18 @ 06:33) (16 - 18)  SpO2: 98% (09-05-18 @ 06:33) (96% - 100%)  Wt(kg): --  Daily     Daily       Physical Exam:  Gen: NAD  HEENT: EOMI  CV: RRR, normal S1 + S2, no m/r/g  Lungs: CTAB  Abd: soft, non-tender  Ext: No edema    Telemetry: Sinus; occasional bigeminy    Laboratory Data:                        12.8   7.96  )-----------( 189      ( 05 Sep 2018 05:45 )             37.3     09-05    132<L>  |  99  |  15  ----------------------------<  119<H>  3.7   |  20<L>  |  0.79    Ca    9.3      05 Sep 2018 05:45  Mg     2.2     09-05      PTT - ( 04 Sep 2018 13:55 )  PTT:27.5 SEC  CARDIAC MARKERS ( 03 Sep 2018 10:29 )  x     / x     / 335 u/L / 15.90 ng/mL / x              Inpatient Medications:  MEDICATIONS  (STANDING):  artificial tears (preservative free) Ophthalmic Solution 1 Drop(s) Both EYES two times a day  aspirin  chewable 81 milliGRAM(s) Oral daily  atorvastatin 40 milliGRAM(s) Oral at bedtime  folic acid 1 milliGRAM(s) Oral daily  hydrochlorothiazide 12.5 milliGRAM(s) Oral daily  influenza   Vaccine 0.5 milliLiter(s) IntraMuscular once  losartan 50 milliGRAM(s) Oral daily  metoprolol tartrate 25 milliGRAM(s) Oral two times a day  multivitamin 1 Tablet(s) Oral daily      Assessment: 72 year old woman with HTN presents with chest pain found to have NSTEMI.    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  Possibly stress induced cardiomyopathy, as apical ballooning seen on LV gram.  Stop Brilinta.  Obtain TTE to better assess LV function.    #Ventricular bigeminy-  Lopressor started.  Check TTE as stated above.     Over 25 minutes spent on total encounter; more than 50% of the visit was spent counseling and/or coordinating care by the attending physician.      Jame Watkins MD [facility]  Cardiovascular Disease  [phone]

## 2018-09-05 NOTE — DISCHARGE NOTE ADULT - CARE PLAN
Principal Discharge DX:	NSTEMI (non-ST elevated myocardial infarction)  Goal:	To be asymptomatic, to reduce risks factors such as hypertension, diabetes and hyperlipidemia to lower the risk of blood clots formation; and to prevent complications of coronary artery disease such as worsening chest pain, heart attack and death.  Assessment and plan of treatment:	Continue aspirin and Plavix, do not stop unless instructed by your physician.  Continue low salt, fat, cholesterol and carbohydrate diet. Follow up with cardiologist and primary care physician's routine appointment. Principal Discharge DX:	NSTEMI (non-ST elevated myocardial infarction)  Goal:	To be asymptomatic, to reduce risks factors such as hypertension, diabetes and hyperlipidemia to lower the risk of blood clots formation; and to prevent complications of coronary artery disease such as worsening chest pain, heart attack and death.  Assessment and plan of treatment:	Continue aspirin and metoprolol and lipitor  do not stop unless instructed by your physician.  Continue low salt, fat, cholesterol and carbohydrate diet. Follow up with cardiologist and primary care physician's routine appointment.  Follow up with DR Lorenzana  Secondary Diagnosis:	HTN (hypertension)  Assessment and plan of treatment:	low salt, low fat, low cholesterol   STOP LOSARTAN HCTZ  Secondary Diagnosis:	Takotsubo cardiomyopathy  Assessment and plan of treatment:	Follow up with DR Lorenzana on 9/21/2018 3:30pm  will need repeat TTE in 3 months Principal Discharge DX:	NSTEMI (non-ST elevated myocardial infarction)  Goal:	To be asymptomatic, to reduce risks factors such as hypertension, diabetes and hyperlipidemia to lower the risk of blood clots formation; and to prevent complications of coronary artery disease such as worsening chest pain, heart attack and death.  Assessment and plan of treatment:	Continue aspirin and metoprolol and lipitor. Do not stop unless instructed by your physician.  Continue low salt, fat, cholesterol and carbohydrate diet. Follow up with cardiologist and primary care physician's routine appointment.  Follow up with DR Watkins  in the office on 9/21/2018 at 3:30 pm.  Secondary Diagnosis:	HTN (hypertension)  Assessment and plan of treatment:	low salt, low fat, low cholesterol   STOP LOSARTAN HCTZ  Secondary Diagnosis:	Takotsubo cardiomyopathy  Assessment and plan of treatment:	Follow up with DR Lorenzana on 9/21/2018 3:30pm  will need repeat TTE in 3 months Principal Discharge DX:	NSTEMI (non-ST elevated myocardial infarction)  Goal:	To be asymptomatic, to reduce risks factors such as hypertension, diabetes and hyperlipidemia to lower the risk of blood clots formation; and to prevent complications of coronary artery disease such as worsening chest pain, heart attack and death.  Assessment and plan of treatment:	Continue aspirin and metoprolol and lipitor. Do not stop unless instructed by your physician.  Continue low salt, fat, cholesterol and carbohydrate diet. Follow up with cardiologist and primary care physician's routine appointment.  Follow up with DR Watkins  in the office on 9/21/2018 at 3:30 pm.  Follow up with your primary care doctor tomorrow 9/7/2018 for bloodwork- sodium levels  Secondary Diagnosis:	HTN (hypertension)  Assessment and plan of treatment:	low salt, low fat, low cholesterol   STOP LOSARTAN HCTZ  Secondary Diagnosis:	Takotsubo cardiomyopathy  Assessment and plan of treatment:	Follow up with DR Lorenzana on 9/21/2018 3:30pm  will need repeat TTE in 3 months

## 2018-09-05 NOTE — DISCHARGE NOTE ADULT - HOSPITAL COURSE
73 y/o female with PMHx of HTN presenting to the ED with progressively worsening midsternal chest pressure on exertion for 3 weeks acutely worse last night as she was walking up a flight of stairs. Patient reports that chest pain is only felt on exertion intermittently and is improved with rest. She took 325mg aspirin this morning with mild relief in symptoms. Pain radiates down left arm and is associated with lightheadedness. She denies fever, chills, headache, blurred vision, palpitations, shortness of breath, back pain, abdominal pain, nausea, vomiting, diaphoresis, dysuria, calf pain. Of note, patient reports feeling muscle cramping in b/l legs at night once in a while. Routine stress test was done 1.5 years ago which was unremarkable. Patient is currently lying in bed comfortably and denies chest pain.    In the ED, EKG showing NSR @ 76bpm, TWI in inferior and lateral leads (no EKG available for comparison).   His Trop noted to be elevated (275) and patient was started on a heparin gtt and ASA/Brilinta for concern for ACS.  Cardiology consulted and recommended echocardiogram and cardiac cath.  Pt underwent cardiac cath on 9/4 showing EF 30, ostial LCx 30, mRCA 40-50.  Medical therapy recommended with stopping of Brilinta.  Echocardiogram done on 9/5 showed ____ 73 y/o female with PMHx of HTN presenting to the ED with progressively worsening midsternal chest pressure on exertion for 3 weeks acutely worse last night as she was walking up a flight of stairs. Patient reports that chest pain is only felt on exertion intermittently and is improved with rest. She took 325mg aspirin this morning with mild relief in symptoms. Pain radiates down left arm and is associated with lightheadedness. She denies fever, chills, headache, blurred vision, palpitations, shortness of breath, back pain, abdominal pain, nausea, vomiting, diaphoresis, dysuria, calf pain. Of note, patient reports feeling muscle cramping in b/l legs at night once in a while. Routine stress test was done 1.5 years ago which was unremarkable. Patient is currently lying in bed comfortably and denies chest pain.    In the ED, EKG showing NSR @ 76bpm, TWI in inferior and lateral leads (no EKG available for comparison).   His Trop noted to be elevated (275) and patient was started on a heparin gtt and ASA/Brilinta for concern for ACS.  Cardiology consulted and recommended echocardiogram and cardiac cath.  Pt underwent cardiac cath on 9/4 showing EF 30, ostial LCx 30, mRCA 40-50.  Medical therapy recommended with stopping of Brilinta.  Echocardiogram done on 9/5 showed takotsubo cardiomyopathy with severe LV dysfunction and LVOT obstruction. AS p[er DR Watkins patient stable for discharge home FU Dr Watkins 9.21 appointment made. dc brilinta, losartan HCTZ as per DR Watkins. THis will also help hyponatremia. Case discussed extensively with attending.         test  EKG: NSR @76bpm, TWI in inferior and lateral leads  hsTrop: 275-->256-->223-->202  CK: 435-->347-->335  HgA1C: 5.8  CXR: Left retrocardiac opacity may represent atelectasis or pneumonia.    9/4 CATH: EF 30, ostial LCx 30, mRCA 40-50; medical therapy; RFA access   9/4/18 - s/p Cardiac Cath - Right groin site stable     9/5 TTE with Doppler (w/Cont)  1. Systolic anterior motion of the mitral valve. Moderate mitral regurgitation.  2. Severely dilated left atrium.  LA volume index = 52cc/m2.  3. Severe segmental left ventricular systolic dysfunction.  There is severe hypokinesis of the distal myocardial segments and apex.  Endocardial visualization enhanced with  intravenous injection of echo contrast (Definity). Peak left ventricular outflow tract gradient equals 113 mm Hg, mean gradient is equal to 48 mm Hg, consistent with severe LVOT obstruction.  4. Normal right ventricular size and function.  5. Normal tricuspid valve.  Minimal tricuspid regurgitation.  6. Estimated pulmonary artery systolic pressure equals 50mm Hg, assuming right atrial pressure equals 10  mm Hg, consistent with moderate pulmonary hypertension.  *** No previous Echo exam. 71 y/o female with PMHx of HTN presenting to the ED with progressively worsening midsternal chest pressure on exertion for 3 weeks acutely worse last night as she was walking up a flight of stairs. Patient reports that chest pain is only felt on exertion intermittently and is improved with rest. She took 325mg aspirin this morning with mild relief in symptoms. Pain radiates down left arm and is associated with lightheadedness. She denies fever, chills, headache, blurred vision, palpitations, shortness of breath, back pain, abdominal pain, nausea, vomiting, diaphoresis, dysuria, calf pain. Of note, patient reports feeling muscle cramping in b/l legs at night once in a while. Routine stress test was done 1.5 years ago which was unremarkable. Patient is currently lying in bed comfortably and denies chest pain.    In the ED, EKG showing NSR @ 76bpm, TWI in inferior and lateral leads (no EKG available for comparison).   His Trop noted to be elevated (275) and patient was started on a heparin gtt and ASA/Brilinta for concern for ACS.  Cardiology consulted and recommended echocardiogram and cardiac cath.  Pt underwent cardiac cath on 9/4 showing EF 30, ostial LCx 30, mRCA 40-50.  Medical therapy recommended with stopping of Brilinta.  Echocardiogram done on 9/5 showed takotsubo cardiomyopathy with severe LV dysfunction and LVOT obstruction. AS p[er DR Watkins patient stable for discharge home FU Dr Watkins 9.21 appointment made. dc brilinta, losartan HCTZ as per DR Watkins. THis will also help hyponatremia. Case discussed extensively with attending. Ok to dc despite Na =129.         test  EKG: NSR @76bpm, TWI in inferior and lateral leads  hsTrop: 275-->256-->223-->202  CK: 435-->347-->335  HgA1C: 5.8  CXR: Left retrocardiac opacity may represent atelectasis or pneumonia.    9/4 CATH: EF 30, ostial LCx 30, mRCA 40-50; medical therapy; RFA access   9/4/18 - s/p Cardiac Cath - Right groin site stable     9/5 TTE with Doppler (w/Cont)  1. Systolic anterior motion of the mitral valve. Moderate mitral regurgitation.  2. Severely dilated left atrium.  LA volume index = 52cc/m2.  3. Severe segmental left ventricular systolic dysfunction.  There is severe hypokinesis of the distal myocardial segments and apex.  Endocardial visualization enhanced with  intravenous injection of echo contrast (Definity). Peak left ventricular outflow tract gradient equals 113 mm Hg, mean gradient is equal to 48 mm Hg, consistent with severe LVOT obstruction.  4. Normal right ventricular size and function.  5. Normal tricuspid valve.  Minimal tricuspid regurgitation.  6. Estimated pulmonary artery systolic pressure equals 50mm Hg, assuming right atrial pressure equals 10  mm Hg, consistent with moderate pulmonary hypertension.  *** No previous Echo exam.

## 2018-09-05 NOTE — DISCHARGE NOTE ADULT - CARE PROVIDER_API CALL
Jame Watkins), Internal Medicine  [address] [place] [address]  Phone: [phone]  Fax: [phone]

## 2018-09-05 NOTE — DISCHARGE NOTE ADULT - PATIENT PORTAL LINK FT
You can access the [facility] Patient Portal, offered by [facility], by registering with the following website: [email or web address]

## 2018-09-05 NOTE — DISCHARGE NOTE ADULT - INSTRUCTIONS
No strenuous activity x 3 weeks.  No heavy lifting > 5-10lbs x one week.  Monitor right groin for bleeding, pain, swelling, discharge.  Notify MD if symptoms occur.  You may shower, no baths/swimming x one week.

## 2018-09-05 NOTE — DISCHARGE NOTE ADULT - PLAN OF CARE
To be asymptomatic, to reduce risks factors such as hypertension, diabetes and hyperlipidemia to lower the risk of blood clots formation; and to prevent complications of coronary artery disease such as worsening chest pain, heart attack and death. Continue aspirin and Plavix, do not stop unless instructed by your physician.  Continue low salt, fat, cholesterol and carbohydrate diet. Follow up with cardiologist and primary care physician's routine appointment. Continue aspirin and metoprolol and lipitor  do not stop unless instructed by your physician.  Continue low salt, fat, cholesterol and carbohydrate diet. Follow up with cardiologist and primary care physician's routine appointment.  Follow up with DR Lorenzana Follow up with DR Lorenzana on 9/21/2018 3:30pm  will need repeat TTE in 3 months low salt, low fat, low cholesterol   STOP LOSARTAN HCTZ Continue aspirin and metoprolol and lipitor. Do not stop unless instructed by your physician.  Continue low salt, fat, cholesterol and carbohydrate diet. Follow up with cardiologist and primary care physician's routine appointment.  Follow up with DR Watkins  in the office on 9/21/2018 at 3:30 pm. Continue aspirin and metoprolol and lipitor. Do not stop unless instructed by your physician.  Continue low salt, fat, cholesterol and carbohydrate diet. Follow up with cardiologist and primary care physician's routine appointment.  Follow up with DR Watkins  in the office on 9/21/2018 at 3:30 pm.  Follow up with your primary care doctor tomorrow 9/7/2018 for bloodwork- sodium levels

## 2018-09-05 NOTE — DISCHARGE NOTE ADULT - MEDICATION SUMMARY - MEDICATIONS TO TAKE
I will START or STAY ON the medications listed below when I get home from the hospital:    Sarah Childrens Aspirin 81 mg oral tablet, chewable  -- 1 tab(s) by mouth once a day  -- Indication: For CAD    Lipitor 40 mg oral tablet  -- 1 tab(s) by mouth once a day (at bedtime)  -- Indication: For CAD    Lopressor 50 mg oral tablet  -- 1 tab(s) by mouth 2 times a day  -- Indication: For Cardiomyopathy    Innerclean oral tablet  -- 2 tab(s) by mouth once a day (at bedtime)  -- Indication: For Constipation    folic acid 1 mg oral tablet  -- 1 tab(s) by mouth once a day  -- Indication: For Supplement

## 2018-09-06 VITALS
DIASTOLIC BLOOD PRESSURE: 77 MMHG | SYSTOLIC BLOOD PRESSURE: 98 MMHG | OXYGEN SATURATION: 95 % | RESPIRATION RATE: 18 BRPM | TEMPERATURE: 97 F | HEART RATE: 81 BPM

## 2018-09-06 LAB
BUN SERPL-MCNC: 18 MG/DL — SIGNIFICANT CHANGE UP (ref 7–23)
CALCIUM SERPL-MCNC: 9 MG/DL — SIGNIFICANT CHANGE UP (ref 8.4–10.5)
CHLORIDE SERPL-SCNC: 95 MMOL/L — LOW (ref 98–107)
CO2 SERPL-SCNC: 20 MMOL/L — LOW (ref 22–31)
CREAT SERPL-MCNC: 0.74 MG/DL — SIGNIFICANT CHANGE UP (ref 0.5–1.3)
GLUCOSE SERPL-MCNC: 120 MG/DL — HIGH (ref 70–99)
HCT VFR BLD CALC: 35.5 % — SIGNIFICANT CHANGE UP (ref 34.5–45)
HGB BLD-MCNC: 12.4 G/DL — SIGNIFICANT CHANGE UP (ref 11.5–15.5)
MAGNESIUM SERPL-MCNC: 2.1 MG/DL — SIGNIFICANT CHANGE UP (ref 1.6–2.6)
MCHC RBC-ENTMCNC: 32.8 PG — SIGNIFICANT CHANGE UP (ref 27–34)
MCHC RBC-ENTMCNC: 34.9 % — SIGNIFICANT CHANGE UP (ref 32–36)
MCV RBC AUTO: 93.9 FL — SIGNIFICANT CHANGE UP (ref 80–100)
NRBC # FLD: 0 — SIGNIFICANT CHANGE UP
PHOSPHATE SERPL-MCNC: 3.7 MG/DL — SIGNIFICANT CHANGE UP (ref 2.5–4.5)
PLATELET # BLD AUTO: 185 K/UL — SIGNIFICANT CHANGE UP (ref 150–400)
PMV BLD: 11.9 FL — SIGNIFICANT CHANGE UP (ref 7–13)
POTASSIUM SERPL-MCNC: 3.4 MMOL/L — LOW (ref 3.5–5.3)
POTASSIUM SERPL-SCNC: 3.4 MMOL/L — LOW (ref 3.5–5.3)
RBC # BLD: 3.78 M/UL — LOW (ref 3.8–5.2)
RBC # FLD: 13.8 % — SIGNIFICANT CHANGE UP (ref 10.3–14.5)
SODIUM SERPL-SCNC: 129 MMOL/L — LOW (ref 135–145)
WBC # BLD: 8.11 K/UL — SIGNIFICANT CHANGE UP (ref 3.8–10.5)
WBC # FLD AUTO: 8.11 K/UL — SIGNIFICANT CHANGE UP (ref 3.8–10.5)

## 2018-09-06 RX ORDER — METOPROLOL TARTRATE 50 MG
1 TABLET ORAL
Qty: 60 | Refills: 0
Start: 2018-09-06 | End: 2018-10-05

## 2018-09-06 RX ORDER — METOPROLOL TARTRATE 50 MG
50 TABLET ORAL
Refills: 0 | Status: DISCONTINUED | OUTPATIENT
Start: 2018-09-06 | End: 2018-09-06

## 2018-09-06 RX ORDER — SENNA PLUS 8.6 MG/1
2 TABLET ORAL
Qty: 60 | Refills: 0
Start: 2018-09-06 | End: 2018-10-05

## 2018-09-06 RX ORDER — ATORVASTATIN CALCIUM 80 MG/1
1 TABLET, FILM COATED ORAL
Qty: 30 | Refills: 0
Start: 2018-09-06 | End: 2018-10-05

## 2018-09-06 RX ORDER — POTASSIUM CHLORIDE 20 MEQ
40 PACKET (EA) ORAL ONCE
Refills: 0 | Status: COMPLETED | OUTPATIENT
Start: 2018-09-06 | End: 2018-09-06

## 2018-09-06 RX ORDER — ASPIRIN/CALCIUM CARB/MAGNESIUM 324 MG
1 TABLET ORAL
Qty: 30 | Refills: 0
Start: 2018-09-06 | End: 2018-10-05

## 2018-09-06 RX ORDER — SENNA PLUS 8.6 MG/1
2 TABLET ORAL AT BEDTIME
Refills: 0 | Status: DISCONTINUED | OUTPATIENT
Start: 2018-09-06 | End: 2018-09-06

## 2018-09-06 RX ORDER — POLYETHYLENE GLYCOL 3350 17 G/17G
17 POWDER, FOR SOLUTION ORAL ONCE
Refills: 0 | Status: COMPLETED | OUTPATIENT
Start: 2018-09-06 | End: 2018-09-06

## 2018-09-06 RX ADMIN — Medication 40 MILLIEQUIVALENT(S): at 08:51

## 2018-09-06 RX ADMIN — Medication 1 MILLIGRAM(S): at 08:50

## 2018-09-06 RX ADMIN — Medication 1 DROP(S): at 06:22

## 2018-09-06 RX ADMIN — POLYETHYLENE GLYCOL 3350 17 GRAM(S): 17 POWDER, FOR SOLUTION ORAL at 06:21

## 2018-09-06 RX ADMIN — Medication 81 MILLIGRAM(S): at 08:50

## 2018-09-06 RX ADMIN — LOSARTAN POTASSIUM 50 MILLIGRAM(S): 100 TABLET, FILM COATED ORAL at 06:21

## 2018-09-06 RX ADMIN — Medication 1 TABLET(S): at 08:51

## 2018-09-06 NOTE — PROGRESS NOTE ADULT - SUBJECTIVE AND OBJECTIVE BOX
Cardiovascular Disease Progress Note    Overnight events: No acute events overnight. Ms. Rodriguez denies chest pain or SOB.   Otherwise review of systems negative    Objective Findings:  T(C): 36.7 (09-06-18 @ 06:19), Max: 37 (09-05-18 @ 17:01)  HR: 68 (09-06-18 @ 06:19) (67 - 98)  BP: 102/61 (09-06-18 @ 06:19) (98/49 - 135/62)  RR: 18 (09-06-18 @ 06:19) (18 - 18)  SpO2: 99% (09-06-18 @ 06:19) (95% - 99%)  Wt(kg): --  Daily     Daily       Physical Exam:  Gen: NAD  HEENT: EOMI  CV: RRR, normal S1 + S2, no m/r/g  Lungs: CTAB  Abd: soft, non-tender  Ext: No edema    Telemetry: Sinus; intermittent ventricular bigeminy.    Laboratory Data:                        12.4   8.11  )-----------( 185      ( 06 Sep 2018 06:10 )             35.5     09-05    132<L>  |  99  |  15  ----------------------------<  119<H>  3.7   |  20<L>  |  0.79    Ca    9.3      05 Sep 2018 05:45  Mg     2.2     09-05      PTT - ( 04 Sep 2018 13:55 )  PTT:27.5 SEC          Inpatient Medications:  MEDICATIONS  (STANDING):  artificial tears (preservative free) Ophthalmic Solution 1 Drop(s) Both EYES two times a day  aspirin  chewable 81 milliGRAM(s) Oral daily  atorvastatin 40 milliGRAM(s) Oral at bedtime  folic acid 1 milliGRAM(s) Oral daily  influenza   Vaccine 0.5 milliLiter(s) IntraMuscular once  metoprolol tartrate 50 milliGRAM(s) Oral two times a day  multivitamin 1 Tablet(s) Oral daily  senna 2 Tablet(s) Oral at bedtime      Assessment: 72 year old woman with HTN presents with chest pain found to have NSTEMI.    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  TTE reviewed- severe segmental LV dysfunction of the distal and apical walls consistent with Takotsubo cardiomyopathy.  Will stop losartan and thiazide for LVOT obstruction.  Metoprolol uptitrated.    #Compensated systolic CHF-  Due to stress induced cardiomyopathy.  Euvolemic on exam.  No ARB due to LVOT obstruction.  Repeat TTE in three months.    #Ventricular bigeminy-  Uptitrate Lopressor, as stated above.     Discharge today.  Ms. Rodriguez will see me in the office on 9/21/2018.     Over 25 minutes spent on total encounter; more than 50% of the visit was spent counseling and/or coordinating care by the attending physician.      Jame Watkins MD [facility]  Cardiovascular Disease  [phone] Cardiovascular Disease Progress Note    Overnight events: No acute events overnight. Ms. Rodriguez denies chest pain or SOB.   Otherwise review of systems negative    Objective Findings:  T(C): 36.7 (09-06-18 @ 06:19), Max: 37 (09-05-18 @ 17:01)  HR: 68 (09-06-18 @ 06:19) (67 - 98)  BP: 102/61 (09-06-18 @ 06:19) (98/49 - 135/62)  RR: 18 (09-06-18 @ 06:19) (18 - 18)  SpO2: 99% (09-06-18 @ 06:19) (95% - 99%)  Wt(kg): --  Daily     Daily       Physical Exam:  Gen: NAD  HEENT: EOMI  CV: RRR, normal S1 + S2, no m/r/g  Lungs: CTAB  Abd: soft, non-tender  Ext: No edema    Telemetry: Sinus; intermittent ventricular bigeminy.    Laboratory Data:                        12.4   8.11  )-----------( 185      ( 06 Sep 2018 06:10 )             35.5     09-05    132<L>  |  99  |  15  ----------------------------<  119<H>  3.7   |  20<L>  |  0.79    Ca    9.3      05 Sep 2018 05:45  Mg     2.2     09-05      PTT - ( 04 Sep 2018 13:55 )  PTT:27.5 SEC          Inpatient Medications:  MEDICATIONS  (STANDING):  artificial tears (preservative free) Ophthalmic Solution 1 Drop(s) Both EYES two times a day  aspirin  chewable 81 milliGRAM(s) Oral daily  atorvastatin 40 milliGRAM(s) Oral at bedtime  folic acid 1 milliGRAM(s) Oral daily  influenza   Vaccine 0.5 milliLiter(s) IntraMuscular once  metoprolol tartrate 50 milliGRAM(s) Oral two times a day  multivitamin 1 Tablet(s) Oral daily  senna 2 Tablet(s) Oral at bedtime      Assessment: 72 year old woman with HTN presents with chest pain found to have NSTEMI.    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  TTE reviewed- severe segmental LV dysfunction of the distal and apical walls consistent with Takotsubo cardiomyopathy.  Will stop losartan and thiazide for LVOT obstruction.  Metoprolol uptitrated.    #Compensated systolic CHF-  Due to stress induced cardiomyopathy.  Euvolemic on exam.  No ARB due to LVOT obstruction.  Repeat TTE in three months.    #Ventricular bigeminy-  Uptitrate Lopressor, as stated above.     Discharge today.  Ms. Rodriguez will see me in the office on 9/21/2018 at 3:30 pm.    Over 25 minutes spent on total encounter; more than 50% of the visit was spent counseling and/or coordinating care by the attending physician.      Jame Watkins MD [facility]  Cardiovascular Disease  [phone]

## 2018-09-06 NOTE — CHART NOTE - NSCHARTNOTEFT_GEN_A_CORE
Patient states mild left arm pain since admission which has been intermittent since onset and positional  Subjective: No chest pain, SOB, PND, orthopnea, palpitations, diaphoresis, lightheadedness, dizziness, syncope, fever, chills, malaise, myalgias, weight changes, night sweats, abd pain, nausea, vomiting, constipation, diarrhea, BRBPR, melena, urinary symptoms, HA visual changes, paraesthesias, numbness, weakness, paralysis, ataxia, dysarthria.     Physical Exam  Vital Signs Last 24 Hrs  T(C): 36.3 (06 Sep 2018 10:47), Max: 37 (05 Sep 2018 17:01)  T(F): 97.3 (06 Sep 2018 10:47), Max: 98.6 (05 Sep 2018 17:01)  HR: 81 (06 Sep 2018 10:47) (67 - 90)  BP: 98/77 (06 Sep 2018 10:47) (98/77 - 135/62)  BP(mean): --  RR: 18 (06 Sep 2018 10:47) (18 - 18)  SpO2: 95% (06 Sep 2018 10:47) (95% - 99%)  Appearance: Normal	  HEENT:   Normal oral mucosa, PERRL, EOMI	  Lymphatic: No lymphadenopathy  Cardiovascular: Normal S1 S2, No JVD, No murmurs, No edema  Respiratory: Lungs clear to auscultation	  Psychiatry: A & O x 3, Mood & affect appropriate  Gastrointestinal:  Soft, Non-tender, + BS	  Skin: No rashes, No ecchymoses, No cyanosis  Neurologic: Non-focal  Extremities: Normal range of motion, No clubbing, cyanosis or edema  Vascular: Peripheral pulses palpable 2+ bilaterally    TELEMETRY: Rate regular @	75 bpm    ECG:  	  RADIOLOGY:   DIAGNOSTIC TESTING:    LABS                        12.4 [11.5 - 15.5]  8.11 [3.8 - 10.5] )-----------( 185 [150 - 400]    ( 06 Sep 2018 06:10 )             35.5 [34.5 - 45.0]    09-06    129<L>  |  95<L>  |  18  ----------------------------<  120<H>  3.4<L>   |  20<L>  |  0.74    Ca    9.0      06 Sep 2018 06:10  Phos  3.7     09-06  Mg     2.1     09-06      CAPILLARY BLOOD GLUCOSE      I&O's Detail    05 Sep 2018 07:01  -  06 Sep 2018 07:00  --------------------------------------------------------  IN:  Total IN: 0 mL  OUT:    Voided: 600 mL  Total OUT: 600 mL    Total NET: -600 mL    sulfa drugs (Swelling)  MEDICATIONS  (STANDING):  artificial tears (preservative free) Ophthalmic Solution 1 Drop(s) Both EYES two times a day  aspirin  chewable 81 milliGRAM(s) Oral daily  atorvastatin 40 milliGRAM(s) Oral at bedtime  folic acid 1 milliGRAM(s) Oral daily  influenza   Vaccine 0.5 milliLiter(s) IntraMuscular once  metoprolol tartrate 50 milliGRAM(s) Oral two times a day  multivitamin 1 Tablet(s) Oral daily  senna 2 Tablet(s) Oral at bedtime    MEDICATIONS  (PRN):  acetaminophen   Tablet. 650 milliGRAM(s) Oral every 6 hours PRN mild, moderate, severe pain      73 yo F with takotsubo cardiomyopathy with LVOT obstruction and mild CAD with left arm pain  case dw Dr Watkins. No further testing needed.  Continue BB and titrate up as outpatient  dc losartan  d/c HCTZ  asa BB , statin for mild CAD  dc home as per Dr Watkins Patient states mild left arm pain since admission which has been intermittent since onset and positional  Subjective: No chest pain, SOB, PND, orthopnea, palpitations, diaphoresis, lightheadedness, dizziness, syncope, fever, chills, malaise, myalgias, weight changes, night sweats, abd pain, nausea, vomiting, constipation, diarrhea, BRBPR, melena, urinary symptoms, HA visual changes, paraesthesias, numbness, weakness, paralysis, ataxia, dysarthria.     Physical Exam  Vital Signs Last 24 Hrs  T(C): 36.3 (06 Sep 2018 10:47), Max: 37 (05 Sep 2018 17:01)  T(F): 97.3 (06 Sep 2018 10:47), Max: 98.6 (05 Sep 2018 17:01)  HR: 81 (06 Sep 2018 10:47) (67 - 90)  BP: 98/77 (06 Sep 2018 10:47) (98/77 - 135/62)  BP(mean): --  RR: 18 (06 Sep 2018 10:47) (18 - 18)  SpO2: 95% (06 Sep 2018 10:47) (95% - 99%)  Appearance: Normal	  HEENT:   Normal oral mucosa, PERRL, EOMI	  Lymphatic: No lymphadenopathy  Cardiovascular: Normal S1 S2, No JVD, No murmurs, No edema  Respiratory: Lungs clear to auscultation	  Psychiatry: A & O x 3, Mood & affect appropriate  Gastrointestinal:  Soft, Non-tender, + BS	  Skin: No rashes, No ecchymoses, No cyanosis  Neurologic: Non-focal  Extremities: Normal range of motion, No clubbing, cyanosis or edema  Vascular: Peripheral pulses palpable 2+ bilaterally    TELEMETRY: Rate regular @	75 bpm    ECG:  	  RADIOLOGY:   DIAGNOSTIC TESTING:    LABS                        12.4 [11.5 - 15.5]  8.11 [3.8 - 10.5] )-----------( 185 [150 - 400]    ( 06 Sep 2018 06:10 )             35.5 [34.5 - 45.0]    09-06    129<L>  |  95<L>  |  18  ----------------------------<  120<H>  3.4<L>   |  20<L>  |  0.74    Ca    9.0      06 Sep 2018 06:10  Phos  3.7     09-06  Mg     2.1     09-06      CAPILLARY BLOOD GLUCOSE      I&O's Detail    05 Sep 2018 07:01  -  06 Sep 2018 07:00  --------------------------------------------------------  IN:  Total IN: 0 mL  OUT:    Voided: 600 mL  Total OUT: 600 mL    Total NET: -600 mL    sulfa drugs (Swelling)  MEDICATIONS  (STANDING):  artificial tears (preservative free) Ophthalmic Solution 1 Drop(s) Both EYES two times a day  aspirin  chewable 81 milliGRAM(s) Oral daily  atorvastatin 40 milliGRAM(s) Oral at bedtime  folic acid 1 milliGRAM(s) Oral daily  influenza   Vaccine 0.5 milliLiter(s) IntraMuscular once  metoprolol tartrate 50 milliGRAM(s) Oral two times a day  multivitamin 1 Tablet(s) Oral daily  senna 2 Tablet(s) Oral at bedtime    MEDICATIONS  (PRN):  acetaminophen   Tablet. 650 milliGRAM(s) Oral every 6 hours PRN mild, moderate, severe pain      71 yo F with takotsubo cardiomyopathy with LVOT obstruction and mild CAD with left arm pain  case dw Dr Watkins. No further testing needed.  Continue BB and titrate up as outpatient  dc losartan  d/c HCTZ  asa BB , statin for mild CAD  dc home as per Dr Watkins.  She will follow up with him as an outpatient.

## 2019-04-07 ENCOUNTER — EMERGENCY (EMERGENCY)
Facility: HOSPITAL | Age: 74
LOS: 1 days | Discharge: HOME | End: 2019-04-07
Attending: EMERGENCY MEDICINE | Admitting: EMERGENCY MEDICINE
Payer: MEDICARE

## 2019-04-07 VITALS
OXYGEN SATURATION: 96 % | RESPIRATION RATE: 18 BRPM | TEMPERATURE: 98 F | DIASTOLIC BLOOD PRESSURE: 57 MMHG | HEART RATE: 104 BPM | SYSTOLIC BLOOD PRESSURE: 108 MMHG

## 2019-04-07 VITALS
OXYGEN SATURATION: 97 % | TEMPERATURE: 98 F | HEART RATE: 96 BPM | DIASTOLIC BLOOD PRESSURE: 80 MMHG | RESPIRATION RATE: 16 BRPM | SYSTOLIC BLOOD PRESSURE: 146 MMHG

## 2019-04-07 DIAGNOSIS — Z98.890 OTHER SPECIFIED POSTPROCEDURAL STATES: Chronic | ICD-10-CM

## 2019-04-07 LAB
ALBUMIN SERPL ELPH-MCNC: 3.6 G/DL — SIGNIFICANT CHANGE UP (ref 3.3–5)
ALP SERPL-CCNC: 100 U/L — SIGNIFICANT CHANGE UP (ref 40–120)
ALT FLD-CCNC: 131 U/L — HIGH (ref 4–33)
ANION GAP SERPL CALC-SCNC: 13 MMO/L — SIGNIFICANT CHANGE UP (ref 7–14)
AST SERPL-CCNC: 84 U/L — HIGH (ref 4–32)
B PERT DNA SPEC QL NAA+PROBE: NOT DETECTED — SIGNIFICANT CHANGE UP
BASOPHILS # BLD AUTO: 0.04 K/UL — SIGNIFICANT CHANGE UP (ref 0–0.2)
BASOPHILS NFR BLD AUTO: 0.4 % — SIGNIFICANT CHANGE UP (ref 0–2)
BILIRUB SERPL-MCNC: 0.2 MG/DL — SIGNIFICANT CHANGE UP (ref 0.2–1.2)
BUN SERPL-MCNC: 13 MG/DL — SIGNIFICANT CHANGE UP (ref 7–23)
C PNEUM DNA SPEC QL NAA+PROBE: NOT DETECTED — SIGNIFICANT CHANGE UP
CALCIUM SERPL-MCNC: 9.7 MG/DL — SIGNIFICANT CHANGE UP (ref 8.4–10.5)
CHLORIDE SERPL-SCNC: 95 MMOL/L — LOW (ref 98–107)
CO2 SERPL-SCNC: 25 MMOL/L — SIGNIFICANT CHANGE UP (ref 22–31)
CREAT SERPL-MCNC: 0.77 MG/DL — SIGNIFICANT CHANGE UP (ref 0.5–1.3)
EOSINOPHIL # BLD AUTO: 0.06 K/UL — SIGNIFICANT CHANGE UP (ref 0–0.5)
EOSINOPHIL NFR BLD AUTO: 0.6 % — SIGNIFICANT CHANGE UP (ref 0–6)
FLUAV H1 2009 PAND RNA SPEC QL NAA+PROBE: NOT DETECTED — SIGNIFICANT CHANGE UP
FLUAV H1 RNA SPEC QL NAA+PROBE: NOT DETECTED — SIGNIFICANT CHANGE UP
FLUAV H3 RNA SPEC QL NAA+PROBE: NOT DETECTED — SIGNIFICANT CHANGE UP
FLUAV SUBTYP SPEC NAA+PROBE: NOT DETECTED — SIGNIFICANT CHANGE UP
FLUBV RNA SPEC QL NAA+PROBE: NOT DETECTED — SIGNIFICANT CHANGE UP
GLUCOSE SERPL-MCNC: 106 MG/DL — HIGH (ref 70–99)
HADV DNA SPEC QL NAA+PROBE: NOT DETECTED — SIGNIFICANT CHANGE UP
HCOV PNL SPEC NAA+PROBE: SIGNIFICANT CHANGE UP
HCT VFR BLD CALC: 37.5 % — SIGNIFICANT CHANGE UP (ref 34.5–45)
HGB BLD-MCNC: 12.5 G/DL — SIGNIFICANT CHANGE UP (ref 11.5–15.5)
HMPV RNA SPEC QL NAA+PROBE: NOT DETECTED — SIGNIFICANT CHANGE UP
HPIV1 RNA SPEC QL NAA+PROBE: NOT DETECTED — SIGNIFICANT CHANGE UP
HPIV2 RNA SPEC QL NAA+PROBE: NOT DETECTED — SIGNIFICANT CHANGE UP
HPIV3 RNA SPEC QL NAA+PROBE: NOT DETECTED — SIGNIFICANT CHANGE UP
HPIV4 RNA SPEC QL NAA+PROBE: NOT DETECTED — SIGNIFICANT CHANGE UP
IMM GRANULOCYTES NFR BLD AUTO: 0.7 % — SIGNIFICANT CHANGE UP (ref 0–1.5)
LYMPHOCYTES # BLD AUTO: 1.5 K/UL — SIGNIFICANT CHANGE UP (ref 1–3.3)
LYMPHOCYTES # BLD AUTO: 14.8 % — SIGNIFICANT CHANGE UP (ref 13–44)
MCHC RBC-ENTMCNC: 31.9 PG — SIGNIFICANT CHANGE UP (ref 27–34)
MCHC RBC-ENTMCNC: 33.3 % — SIGNIFICANT CHANGE UP (ref 32–36)
MCV RBC AUTO: 95.7 FL — SIGNIFICANT CHANGE UP (ref 80–100)
MONOCYTES # BLD AUTO: 1.1 K/UL — HIGH (ref 0–0.9)
MONOCYTES NFR BLD AUTO: 10.9 % — SIGNIFICANT CHANGE UP (ref 2–14)
NEUTROPHILS # BLD AUTO: 7.36 K/UL — SIGNIFICANT CHANGE UP (ref 1.8–7.4)
NEUTROPHILS NFR BLD AUTO: 72.6 % — SIGNIFICANT CHANGE UP (ref 43–77)
NRBC # FLD: 0 K/UL — SIGNIFICANT CHANGE UP (ref 0–0)
PLATELET # BLD AUTO: 189 K/UL — SIGNIFICANT CHANGE UP (ref 150–400)
PMV BLD: 11.2 FL — SIGNIFICANT CHANGE UP (ref 7–13)
POTASSIUM SERPL-MCNC: 4.4 MMOL/L — SIGNIFICANT CHANGE UP (ref 3.5–5.3)
POTASSIUM SERPL-SCNC: 4.4 MMOL/L — SIGNIFICANT CHANGE UP (ref 3.5–5.3)
PROT SERPL-MCNC: 8 G/DL — SIGNIFICANT CHANGE UP (ref 6–8.3)
RBC # BLD: 3.92 M/UL — SIGNIFICANT CHANGE UP (ref 3.8–5.2)
RBC # FLD: 14.5 % — SIGNIFICANT CHANGE UP (ref 10.3–14.5)
RSV RNA SPEC QL NAA+PROBE: NOT DETECTED — SIGNIFICANT CHANGE UP
RV+EV RNA SPEC QL NAA+PROBE: NOT DETECTED — SIGNIFICANT CHANGE UP
SODIUM SERPL-SCNC: 133 MMOL/L — LOW (ref 135–145)
TROPONIN T, HIGH SENSITIVITY: 13 NG/L — SIGNIFICANT CHANGE UP (ref ?–14)
TROPONIN T, HIGH SENSITIVITY: 17 NG/L — SIGNIFICANT CHANGE UP (ref ?–14)
WBC # BLD: 10.13 K/UL — SIGNIFICANT CHANGE UP (ref 3.8–10.5)
WBC # FLD AUTO: 10.13 K/UL — SIGNIFICANT CHANGE UP (ref 3.8–10.5)

## 2019-04-07 PROCEDURE — 93010 ELECTROCARDIOGRAM REPORT: CPT

## 2019-04-07 PROCEDURE — 71046 X-RAY EXAM CHEST 2 VIEWS: CPT | Mod: 26

## 2019-04-07 PROCEDURE — 99285 EMERGENCY DEPT VISIT HI MDM: CPT | Mod: 25,GC

## 2019-04-07 RX ORDER — ALBUTEROL 90 UG/1
2.5 AEROSOL, METERED ORAL ONCE
Refills: 0 | Status: DISCONTINUED | OUTPATIENT
Start: 2019-04-07 | End: 2019-04-07

## 2019-04-07 RX ORDER — LEVOFLOXACIN 5 MG/ML
1 INJECTION, SOLUTION INTRAVENOUS
Qty: 6 | Refills: 0
Start: 2019-04-07 | End: 2019-04-12

## 2019-04-07 RX ORDER — ALBUTEROL 90 UG/1
1 AEROSOL, METERED ORAL ONCE
Refills: 0 | Status: COMPLETED | OUTPATIENT
Start: 2019-04-07 | End: 2019-04-07

## 2019-04-07 RX ORDER — BENZOCAINE AND MENTHOL 5; 1 G/100ML; G/100ML
4 LIQUID ORAL ONCE
Refills: 0 | Status: COMPLETED | OUTPATIENT
Start: 2019-04-07 | End: 2019-04-07

## 2019-04-07 RX ORDER — ALBUTEROL 90 UG/1
2.5 AEROSOL, METERED ORAL ONCE
Refills: 0 | Status: COMPLETED | OUTPATIENT
Start: 2019-04-07 | End: 2019-04-07

## 2019-04-07 RX ORDER — SODIUM CHLORIDE 9 MG/ML
2000 INJECTION INTRAMUSCULAR; INTRAVENOUS; SUBCUTANEOUS ONCE
Refills: 0 | Status: COMPLETED | OUTPATIENT
Start: 2019-04-07 | End: 2019-04-07

## 2019-04-07 RX ORDER — ACETAMINOPHEN 500 MG
650 TABLET ORAL ONCE
Refills: 0 | Status: COMPLETED | OUTPATIENT
Start: 2019-04-07 | End: 2019-04-07

## 2019-04-07 RX ORDER — ALBUTEROL 90 UG/1
1 AEROSOL, METERED ORAL EVERY 4 HOURS
Refills: 0 | Status: DISCONTINUED | OUTPATIENT
Start: 2019-04-07 | End: 2019-04-11

## 2019-04-07 RX ADMIN — ALBUTEROL 1 PUFF(S): 90 AEROSOL, METERED ORAL at 17:35

## 2019-04-07 RX ADMIN — BENZOCAINE AND MENTHOL 4 LOZENGE: 5; 1 LIQUID ORAL at 15:03

## 2019-04-07 RX ADMIN — BENZOCAINE AND MENTHOL 4 LOZENGE: 5; 1 LIQUID ORAL at 12:21

## 2019-04-07 RX ADMIN — ALBUTEROL 2.5 MILLIGRAM(S): 90 AEROSOL, METERED ORAL at 12:22

## 2019-04-07 RX ADMIN — ALBUTEROL 2.5 MILLIGRAM(S): 90 AEROSOL, METERED ORAL at 13:15

## 2019-04-07 RX ADMIN — Medication 100 MILLIGRAM(S): at 15:32

## 2019-04-07 RX ADMIN — Medication 650 MILLIGRAM(S): at 14:02

## 2019-04-07 RX ADMIN — SODIUM CHLORIDE 1000 MILLILITER(S): 9 INJECTION INTRAMUSCULAR; INTRAVENOUS; SUBCUTANEOUS at 14:02

## 2019-04-07 NOTE — ED PROVIDER NOTE - ATTENDING CONTRIBUTION TO CARE
Patient is a 74 yo F with history of HTN here for evaluation of 1 week of body aches, fever, chills, nonproductive cough and headache. Denies travel, sick contacts. no nausea, vomiting. No chest pain, sob, abdominal pain. No bloody stools. Denies dysuria. Patient took tylenol before coming in. She was never a smoker. She received the flu shot in early March.   VS noted  Gen. no acute distress, coughing during exam  HEENT: EOMI, mmm, mild erythema to pharynx, no exudate  Lungs: CTAB/L no C/ W /R   CVS: RRR   Abd; Soft non tender, non distended   Ext: no edema  Skin: no rash  Neuro AAOx3 non focal clear speech  a/p: 1 week of cough, body aches, fever, chills. Will check labs, CXR, u/a, check for flu, give albuterol for cough and reassess. Viral etiology suspected. Given fever, cough, will r/o pneumonia.   - Akua ZUÑIGA Patient is a 74 yo F with history of HTN here for evaluation of 1 week of body aches, fever, chills, nonproductive cough and headache. Denies travel, sick contacts. no nausea, vomiting. No chest pain, sob, abdominal pain. No bloody stools. Denies dysuria. Patient took tylenol before coming in. She was never a smoker. She received the flu shot in early March.   VS noted  Gen. no acute distress, coughing during exam  HEENT: EOMI, mmm, mild erythema to pharynx, no exudate  Lungs: CTAB/L no C/ W /R   CVS: RRR   Abd; Soft non tender, non distended   Ext: no edema  Skin: no rash  Neuro AAOx3 non focal clear speech  a/p: 1 week of cough, body aches, fever, chills. Will check labs, CXR, u/a, check for flu, give albuterol for cough and reassess. Will check RVP. Given fever, cough, will r/o pneumonia.   - Akua ZUÑIGA

## 2019-04-07 NOTE — ED ADULT TRIAGE NOTE - CHIEF COMPLAINT QUOTE
pt comes to ED for cough r/o flu x 1 week. pt had a fever last night. pt is a febrile here. pt VSS face mask applied in triage. pt states she only has CP when she coughs.

## 2019-04-07 NOTE — ED PROVIDER NOTE - NSPTACCESSSVCSAPPTDETAILS_ED_ALL_ED_FT
Please make an appointment with your PCP (Dr. Prasanna Cheema) to be seen within 24 to 48 hours after leaving the hospital.

## 2019-04-07 NOTE — ED PROVIDER NOTE - OBJECTIVE STATEMENT
73F pmh HTN presents to ED from home with chills, body aches, cough, headaches for 1 week. Pt reports that she started feeling ill all of the sudden 1 week ago. She reports that she started with a  non productive cough, body aches, fevers up to 102 at home, and chills. Pt took tylenol with some relief, but noted the cough to be unrelenting. Pt denies SOB, chest pain, palpitations, abd pain.

## 2019-04-07 NOTE — ED PROVIDER NOTE - PLAN OF CARE
You were diagnosed with PNA in the hospital. For this, you were given a prescription for antibiotics. finish antibiotics

## 2019-04-07 NOTE — ED PROVIDER NOTE - PROGRESS NOTE DETAILS
Jesse: pt informed that she has a RML pna. Pt now spiking fever. Spoke with pt and explained that she can be treated at home with oral abx regimen. We called her primary doc's answering service (Dr. Prasanna Cheema [phone]). We await to see if she can be seen this week. Jesse: spoke with PCP who will make appointment for pt this week. agreed with plan for Levaquin 750mg QD. Pt will be discharged with albuterol inhaler and 7 day course of Levo Akua ZUÑIGA: Discussed plan with patient and family. They are comfortable going home. Return instructions discussed in detail - return for shortness of breath, persistent fevers, new symptoms of concern.

## 2019-04-07 NOTE — ED PROVIDER NOTE - NSCAREINITIATED _GEN_ER
no heavy lifting/no tub baths Soumya Fatima(Attending) no heavy lifting/no exercise/no tub baths/no sports/gym

## 2019-04-07 NOTE — ED PROVIDER NOTE - CARE PLAN
Principal Discharge DX:	Pneumonia  Goal:	finish antibiotics  Assessment and plan of treatment:	You were diagnosed with PNA in the hospital. For this, you were given a prescription for antibiotics.

## 2019-04-07 NOTE — ED PROVIDER NOTE - NSFOLLOWUPINSTRUCTIONS_ED_ALL_ED_FT
Follow up with your PCP within 24-48 hours of leaving the hospital.  Please return to the ED if you have worsening of symptoms or if you develop shortness of breath.

## 2019-04-07 NOTE — ED PROVIDER NOTE - CLINICAL SUMMARY MEDICAL DECISION MAKING FREE TEXT BOX
73F pmh HTN now with cough and body aches concerning for flu. Will do RVP, CXR and labs. Will treat cough with albuterol neb and cepacol. Pt not currently febrile. 73F pmh HTN now with cough and body aches concerning for flu. Will do RVP, CXR and labs. Will treat cough with albuterol neb and cepacol. Pt not initially febrile but developed fever in ED. She was treated symptomatically. Patient found to have pneumonia on CXR. She is very well appearing. Discussed with pmd, agree to outpatient treatment. Patient discharged on course of levaquin, agreeable to outpatient management. Return instructions discussed in detail.

## 2019-04-08 LAB
SPECIMEN SOURCE: SIGNIFICANT CHANGE UP
SPECIMEN SOURCE: SIGNIFICANT CHANGE UP

## 2019-04-12 ENCOUNTER — INPATIENT (INPATIENT)
Facility: HOSPITAL | Age: 74
LOS: 3 days | Discharge: HOME | End: 2019-04-16
Attending: HOSPITALIST | Admitting: HOSPITALIST
Payer: MEDICARE

## 2019-04-12 VITALS
DIASTOLIC BLOOD PRESSURE: 94 MMHG | SYSTOLIC BLOOD PRESSURE: 171 MMHG | RESPIRATION RATE: 18 BRPM | HEART RATE: 83 BPM | TEMPERATURE: 98 F | OXYGEN SATURATION: 95 %

## 2019-04-12 DIAGNOSIS — J18.9 PNEUMONIA, UNSPECIFIED ORGANISM: ICD-10-CM

## 2019-04-12 DIAGNOSIS — Z98.890 OTHER SPECIFIED POSTPROCEDURAL STATES: Chronic | ICD-10-CM

## 2019-04-12 LAB
ALBUMIN SERPL ELPH-MCNC: 3.3 G/DL — SIGNIFICANT CHANGE UP (ref 3.3–5)
ALP SERPL-CCNC: 89 U/L — SIGNIFICANT CHANGE UP (ref 40–120)
ALT FLD-CCNC: 88 U/L — HIGH (ref 4–33)
ANION GAP SERPL CALC-SCNC: 10 MMO/L — SIGNIFICANT CHANGE UP (ref 7–14)
APTT BLD: 36.3 SEC — SIGNIFICANT CHANGE UP (ref 27.5–36.3)
AST SERPL-CCNC: 69 U/L — HIGH (ref 4–32)
B PERT DNA SPEC QL NAA+PROBE: NOT DETECTED — SIGNIFICANT CHANGE UP
BACTERIA BLD CULT: SIGNIFICANT CHANGE UP
BACTERIA BLD CULT: SIGNIFICANT CHANGE UP
BASOPHILS # BLD AUTO: 0.03 K/UL — SIGNIFICANT CHANGE UP (ref 0–0.2)
BASOPHILS NFR BLD AUTO: 0.4 % — SIGNIFICANT CHANGE UP (ref 0–2)
BILIRUB SERPL-MCNC: 0.2 MG/DL — SIGNIFICANT CHANGE UP (ref 0.2–1.2)
BUN SERPL-MCNC: 15 MG/DL — SIGNIFICANT CHANGE UP (ref 7–23)
C PNEUM DNA SPEC QL NAA+PROBE: NOT DETECTED — SIGNIFICANT CHANGE UP
CALCIUM SERPL-MCNC: 9.2 MG/DL — SIGNIFICANT CHANGE UP (ref 8.4–10.5)
CHLORIDE SERPL-SCNC: 99 MMOL/L — SIGNIFICANT CHANGE UP (ref 98–107)
CO2 SERPL-SCNC: 25 MMOL/L — SIGNIFICANT CHANGE UP (ref 22–31)
CREAT SERPL-MCNC: 1 MG/DL — SIGNIFICANT CHANGE UP (ref 0.5–1.3)
EOSINOPHIL # BLD AUTO: 0.1 K/UL — SIGNIFICANT CHANGE UP (ref 0–0.5)
EOSINOPHIL NFR BLD AUTO: 1.3 % — SIGNIFICANT CHANGE UP (ref 0–6)
FLUAV H1 2009 PAND RNA SPEC QL NAA+PROBE: NOT DETECTED — SIGNIFICANT CHANGE UP
FLUAV H1 RNA SPEC QL NAA+PROBE: NOT DETECTED — SIGNIFICANT CHANGE UP
FLUAV H3 RNA SPEC QL NAA+PROBE: NOT DETECTED — SIGNIFICANT CHANGE UP
FLUAV SUBTYP SPEC NAA+PROBE: NOT DETECTED — SIGNIFICANT CHANGE UP
FLUBV RNA SPEC QL NAA+PROBE: NOT DETECTED — SIGNIFICANT CHANGE UP
GLUCOSE SERPL-MCNC: 119 MG/DL — HIGH (ref 70–99)
HADV DNA SPEC QL NAA+PROBE: NOT DETECTED — SIGNIFICANT CHANGE UP
HCOV PNL SPEC NAA+PROBE: SIGNIFICANT CHANGE UP
HCT VFR BLD CALC: 33.6 % — LOW (ref 34.5–45)
HGB BLD-MCNC: 11.1 G/DL — LOW (ref 11.5–15.5)
HMPV RNA SPEC QL NAA+PROBE: NOT DETECTED — SIGNIFICANT CHANGE UP
HPIV1 RNA SPEC QL NAA+PROBE: NOT DETECTED — SIGNIFICANT CHANGE UP
HPIV2 RNA SPEC QL NAA+PROBE: NOT DETECTED — SIGNIFICANT CHANGE UP
HPIV3 RNA SPEC QL NAA+PROBE: NOT DETECTED — SIGNIFICANT CHANGE UP
HPIV4 RNA SPEC QL NAA+PROBE: NOT DETECTED — SIGNIFICANT CHANGE UP
IMM GRANULOCYTES NFR BLD AUTO: 0.5 % — SIGNIFICANT CHANGE UP (ref 0–1.5)
INR BLD: 1.09 — SIGNIFICANT CHANGE UP (ref 0.88–1.17)
LYMPHOCYTES # BLD AUTO: 2.08 K/UL — SIGNIFICANT CHANGE UP (ref 1–3.3)
LYMPHOCYTES # BLD AUTO: 26.3 % — SIGNIFICANT CHANGE UP (ref 13–44)
MCHC RBC-ENTMCNC: 31.7 PG — SIGNIFICANT CHANGE UP (ref 27–34)
MCHC RBC-ENTMCNC: 33 % — SIGNIFICANT CHANGE UP (ref 32–36)
MCV RBC AUTO: 96 FL — SIGNIFICANT CHANGE UP (ref 80–100)
MONOCYTES # BLD AUTO: 0.68 K/UL — SIGNIFICANT CHANGE UP (ref 0–0.9)
MONOCYTES NFR BLD AUTO: 8.6 % — SIGNIFICANT CHANGE UP (ref 2–14)
NEUTROPHILS # BLD AUTO: 4.97 K/UL — SIGNIFICANT CHANGE UP (ref 1.8–7.4)
NEUTROPHILS NFR BLD AUTO: 62.9 % — SIGNIFICANT CHANGE UP (ref 43–77)
NRBC # FLD: 0 K/UL — SIGNIFICANT CHANGE UP (ref 0–0)
NT-PROBNP SERPL-SCNC: 986 PG/ML — SIGNIFICANT CHANGE UP
PLATELET # BLD AUTO: 281 K/UL — SIGNIFICANT CHANGE UP (ref 150–400)
PMV BLD: 10.1 FL — SIGNIFICANT CHANGE UP (ref 7–13)
POTASSIUM SERPL-MCNC: 4.6 MMOL/L — SIGNIFICANT CHANGE UP (ref 3.5–5.3)
POTASSIUM SERPL-SCNC: 4.6 MMOL/L — SIGNIFICANT CHANGE UP (ref 3.5–5.3)
PROT SERPL-MCNC: 7.4 G/DL — SIGNIFICANT CHANGE UP (ref 6–8.3)
PROTHROM AB SERPL-ACNC: 12.5 SEC — SIGNIFICANT CHANGE UP (ref 9.8–13.1)
RBC # BLD: 3.5 M/UL — LOW (ref 3.8–5.2)
RBC # FLD: 14.2 % — SIGNIFICANT CHANGE UP (ref 10.3–14.5)
RSV RNA SPEC QL NAA+PROBE: NOT DETECTED — SIGNIFICANT CHANGE UP
RV+EV RNA SPEC QL NAA+PROBE: NOT DETECTED — SIGNIFICANT CHANGE UP
SODIUM SERPL-SCNC: 134 MMOL/L — LOW (ref 135–145)
TROPONIN T, HIGH SENSITIVITY: 19 NG/L — SIGNIFICANT CHANGE UP (ref ?–14)
WBC # BLD: 7.9 K/UL — SIGNIFICANT CHANGE UP (ref 3.8–10.5)
WBC # FLD AUTO: 7.9 K/UL — SIGNIFICANT CHANGE UP (ref 3.8–10.5)

## 2019-04-12 PROCEDURE — 71275 CT ANGIOGRAPHY CHEST: CPT | Mod: 26

## 2019-04-12 PROCEDURE — 71045 X-RAY EXAM CHEST 1 VIEW: CPT | Mod: 26

## 2019-04-12 RX ORDER — SODIUM CHLORIDE 9 MG/ML
1000 INJECTION INTRAMUSCULAR; INTRAVENOUS; SUBCUTANEOUS ONCE
Refills: 0 | Status: COMPLETED | OUTPATIENT
Start: 2019-04-12 | End: 2019-04-12

## 2019-04-12 RX ORDER — IPRATROPIUM/ALBUTEROL SULFATE 18-103MCG
3 AEROSOL WITH ADAPTER (GRAM) INHALATION ONCE
Refills: 0 | Status: COMPLETED | OUTPATIENT
Start: 2019-04-12 | End: 2019-04-12

## 2019-04-12 RX ORDER — CEFTRIAXONE 500 MG/1
1 INJECTION, POWDER, FOR SOLUTION INTRAMUSCULAR; INTRAVENOUS ONCE
Refills: 0 | Status: COMPLETED | OUTPATIENT
Start: 2019-04-12 | End: 2019-04-12

## 2019-04-12 RX ORDER — AZITHROMYCIN 500 MG/1
500 TABLET, FILM COATED ORAL ONCE
Refills: 0 | Status: COMPLETED | OUTPATIENT
Start: 2019-04-12 | End: 2019-04-12

## 2019-04-12 RX ADMIN — SODIUM CHLORIDE 1000 MILLILITER(S): 9 INJECTION INTRAMUSCULAR; INTRAVENOUS; SUBCUTANEOUS at 15:52

## 2019-04-12 RX ADMIN — CEFTRIAXONE 1 GRAM(S): 500 INJECTION, POWDER, FOR SOLUTION INTRAMUSCULAR; INTRAVENOUS at 19:21

## 2019-04-12 RX ADMIN — AZITHROMYCIN 500 MILLIGRAM(S): 500 TABLET, FILM COATED ORAL at 18:07

## 2019-04-12 RX ADMIN — Medication 3 MILLILITER(S): at 15:51

## 2019-04-12 RX ADMIN — CEFTRIAXONE 100 GRAM(S): 500 INJECTION, POWDER, FOR SOLUTION INTRAMUSCULAR; INTRAVENOUS at 15:50

## 2019-04-12 RX ADMIN — Medication 125 MILLIGRAM(S): at 15:51

## 2019-04-12 RX ADMIN — AZITHROMYCIN 250 MILLIGRAM(S): 500 TABLET, FILM COATED ORAL at 17:07

## 2019-04-12 RX ADMIN — SODIUM CHLORIDE 1000 MILLILITER(S): 9 INJECTION INTRAMUSCULAR; INTRAVENOUS; SUBCUTANEOUS at 19:20

## 2019-04-12 NOTE — ED PROVIDER NOTE - CHPI ED SYMPTOMS POS
CHEST CONGESTION/COUGH/DIFFICULTY BREATHING/DYSPNEA ON EXERTION/NASAL CONGESTION/SHORTNESS OF BREATH

## 2019-04-12 NOTE — ED PROVIDER NOTE - CHIEF COMPLAINT
Date: 5/7/2019    Patient Name: Ronen Azar          To Whom it may concern: This letter has been written at the patient's request. The above patient was seen at the [facility] for treatment of a medical condition.     This patient shou
The patient is a 73y Female complaining of shortness of breath.

## 2019-04-12 NOTE — ED ADULT TRIAGE NOTE - CHIEF COMPLAINT QUOTE
c/o SOB x 4 days and hoarse voice x this am, reports was dx'd with pneumonia in another hospital on Levaquin , meloxicam with no relief, breathing is even and unlabored, lung sounds clear B/l denies c/p. noted dry non productive cough in triage.

## 2019-04-12 NOTE — ED ADULT NURSE NOTE - OBJECTIVE STATEMENT
Pt c/o SOB and dry cough, recently dx and discharged at outside hospital with PNA. Breathing even and unlabored. 20 g line placed in RT AC. labs sent, Medicated as per order.

## 2019-04-12 NOTE — ED PROVIDER NOTE - OBJECTIVE STATEMENT
The patient is a 73 year old female presents with SOB, cough, worsening of SOB recently treated for pneumonia on outpatient basis. No nausea, No vomiting, No abd pain, No motor No sensory loss

## 2019-04-13 DIAGNOSIS — R07.9 CHEST PAIN, UNSPECIFIED: ICD-10-CM

## 2019-04-13 DIAGNOSIS — Q24.8 OTHER SPECIFIED CONGENITAL MALFORMATIONS OF HEART: ICD-10-CM

## 2019-04-13 DIAGNOSIS — R49.0 DYSPHONIA: ICD-10-CM

## 2019-04-13 DIAGNOSIS — I10 ESSENTIAL (PRIMARY) HYPERTENSION: ICD-10-CM

## 2019-04-13 DIAGNOSIS — J18.9 PNEUMONIA, UNSPECIFIED ORGANISM: ICD-10-CM

## 2019-04-13 DIAGNOSIS — Z29.9 ENCOUNTER FOR PROPHYLACTIC MEASURES, UNSPECIFIED: ICD-10-CM

## 2019-04-13 LAB
ANION GAP SERPL CALC-SCNC: 13 MMO/L — SIGNIFICANT CHANGE UP (ref 7–14)
BASOPHILS # BLD AUTO: 0.01 K/UL — SIGNIFICANT CHANGE UP (ref 0–0.2)
BASOPHILS NFR BLD AUTO: 0.1 % — SIGNIFICANT CHANGE UP (ref 0–2)
BUN SERPL-MCNC: 12 MG/DL — SIGNIFICANT CHANGE UP (ref 7–23)
CALCIUM SERPL-MCNC: 9.3 MG/DL — SIGNIFICANT CHANGE UP (ref 8.4–10.5)
CHLORIDE SERPL-SCNC: 96 MMOL/L — LOW (ref 98–107)
CO2 SERPL-SCNC: 23 MMOL/L — SIGNIFICANT CHANGE UP (ref 22–31)
CREAT SERPL-MCNC: 0.58 MG/DL — SIGNIFICANT CHANGE UP (ref 0.5–1.3)
EOSINOPHIL # BLD AUTO: 0 K/UL — SIGNIFICANT CHANGE UP (ref 0–0.5)
EOSINOPHIL NFR BLD AUTO: 0 % — SIGNIFICANT CHANGE UP (ref 0–6)
GLUCOSE SERPL-MCNC: 192 MG/DL — HIGH (ref 70–99)
HCT VFR BLD CALC: 34.5 % — SIGNIFICANT CHANGE UP (ref 34.5–45)
HGB BLD-MCNC: 11.3 G/DL — LOW (ref 11.5–15.5)
IMM GRANULOCYTES NFR BLD AUTO: 0.9 % — SIGNIFICANT CHANGE UP (ref 0–1.5)
LYMPHOCYTES # BLD AUTO: 1.17 K/UL — SIGNIFICANT CHANGE UP (ref 1–3.3)
LYMPHOCYTES # BLD AUTO: 15.1 % — SIGNIFICANT CHANGE UP (ref 13–44)
MAGNESIUM SERPL-MCNC: 2.4 MG/DL — SIGNIFICANT CHANGE UP (ref 1.6–2.6)
MCHC RBC-ENTMCNC: 31.5 PG — SIGNIFICANT CHANGE UP (ref 27–34)
MCHC RBC-ENTMCNC: 32.8 % — SIGNIFICANT CHANGE UP (ref 32–36)
MCV RBC AUTO: 96.1 FL — SIGNIFICANT CHANGE UP (ref 80–100)
MONOCYTES # BLD AUTO: 0.24 K/UL — SIGNIFICANT CHANGE UP (ref 0–0.9)
MONOCYTES NFR BLD AUTO: 3.1 % — SIGNIFICANT CHANGE UP (ref 2–14)
NEUTROPHILS # BLD AUTO: 6.28 K/UL — SIGNIFICANT CHANGE UP (ref 1.8–7.4)
NEUTROPHILS NFR BLD AUTO: 80.8 % — HIGH (ref 43–77)
NRBC # FLD: 0.02 K/UL — SIGNIFICANT CHANGE UP (ref 0–0)
PHOSPHATE SERPL-MCNC: 2.7 MG/DL — SIGNIFICANT CHANGE UP (ref 2.5–4.5)
PLATELET # BLD AUTO: 286 K/UL — SIGNIFICANT CHANGE UP (ref 150–400)
PMV BLD: 10.2 FL — SIGNIFICANT CHANGE UP (ref 7–13)
POTASSIUM SERPL-MCNC: 4.5 MMOL/L — SIGNIFICANT CHANGE UP (ref 3.5–5.3)
POTASSIUM SERPL-SCNC: 4.5 MMOL/L — SIGNIFICANT CHANGE UP (ref 3.5–5.3)
RBC # BLD: 3.59 M/UL — LOW (ref 3.8–5.2)
RBC # FLD: 14.4 % — SIGNIFICANT CHANGE UP (ref 10.3–14.5)
SODIUM SERPL-SCNC: 132 MMOL/L — LOW (ref 135–145)
TROPONIN T, HIGH SENSITIVITY: 13 NG/L — SIGNIFICANT CHANGE UP (ref ?–14)
TROPONIN T, HIGH SENSITIVITY: 15 NG/L — SIGNIFICANT CHANGE UP (ref ?–14)
WBC # BLD: 7.77 K/UL — SIGNIFICANT CHANGE UP (ref 3.8–10.5)
WBC # FLD AUTO: 7.77 K/UL — SIGNIFICANT CHANGE UP (ref 3.8–10.5)

## 2019-04-13 PROCEDURE — 12345: CPT | Mod: NC

## 2019-04-13 PROCEDURE — 99223 1ST HOSP IP/OBS HIGH 75: CPT | Mod: AI

## 2019-04-13 RX ORDER — SENNA PLUS 8.6 MG/1
2 TABLET ORAL AT BEDTIME
Refills: 0 | Status: DISCONTINUED | OUTPATIENT
Start: 2019-04-13 | End: 2019-04-16

## 2019-04-13 RX ORDER — ASPIRIN/CALCIUM CARB/MAGNESIUM 324 MG
81 TABLET ORAL DAILY
Refills: 0 | Status: DISCONTINUED | OUTPATIENT
Start: 2019-04-13 | End: 2019-04-16

## 2019-04-13 RX ORDER — LANOLIN ALCOHOL/MO/W.PET/CERES
3 CREAM (GRAM) TOPICAL AT BEDTIME
Refills: 0 | Status: DISCONTINUED | OUTPATIENT
Start: 2019-04-13 | End: 2019-04-16

## 2019-04-13 RX ORDER — METOPROLOL TARTRATE 50 MG
50 TABLET ORAL
Refills: 0 | Status: DISCONTINUED | OUTPATIENT
Start: 2019-04-13 | End: 2019-04-16

## 2019-04-13 RX ORDER — LEVALBUTEROL 1.25 MG/.5ML
0.63 SOLUTION, CONCENTRATE RESPIRATORY (INHALATION) EVERY 6 HOURS
Refills: 0 | Status: DISCONTINUED | OUTPATIENT
Start: 2019-04-13 | End: 2019-04-16

## 2019-04-13 RX ORDER — ATORVASTATIN CALCIUM 80 MG/1
40 TABLET, FILM COATED ORAL AT BEDTIME
Refills: 0 | Status: DISCONTINUED | OUTPATIENT
Start: 2019-04-13 | End: 2019-04-16

## 2019-04-13 RX ORDER — AZITHROMYCIN 500 MG/1
500 TABLET, FILM COATED ORAL EVERY 24 HOURS
Refills: 0 | Status: DISCONTINUED | OUTPATIENT
Start: 2019-04-13 | End: 2019-04-16

## 2019-04-13 RX ORDER — CEFTRIAXONE 500 MG/1
1 INJECTION, POWDER, FOR SOLUTION INTRAMUSCULAR; INTRAVENOUS EVERY 24 HOURS
Refills: 0 | Status: DISCONTINUED | OUTPATIENT
Start: 2019-04-13 | End: 2019-04-16

## 2019-04-13 RX ADMIN — CEFTRIAXONE 100 GRAM(S): 500 INJECTION, POWDER, FOR SOLUTION INTRAMUSCULAR; INTRAVENOUS at 06:40

## 2019-04-13 RX ADMIN — Medication 50 MILLIGRAM(S): at 17:29

## 2019-04-13 RX ADMIN — Medication 81 MILLIGRAM(S): at 12:35

## 2019-04-13 RX ADMIN — Medication 3 MILLIGRAM(S): at 01:42

## 2019-04-13 RX ADMIN — Medication 200 MILLIGRAM(S): at 12:06

## 2019-04-13 RX ADMIN — AZITHROMYCIN 250 MILLIGRAM(S): 500 TABLET, FILM COATED ORAL at 05:38

## 2019-04-13 RX ADMIN — Medication 100 MILLIGRAM(S): at 19:18

## 2019-04-13 RX ADMIN — ATORVASTATIN CALCIUM 40 MILLIGRAM(S): 80 TABLET, FILM COATED ORAL at 21:28

## 2019-04-13 RX ADMIN — Medication 200 MILLIGRAM(S): at 19:19

## 2019-04-13 RX ADMIN — SENNA PLUS 2 TABLET(S): 8.6 TABLET ORAL at 21:28

## 2019-04-13 RX ADMIN — Medication 50 MILLIGRAM(S): at 05:38

## 2019-04-13 RX ADMIN — Medication 200 MILLIGRAM(S): at 05:38

## 2019-04-13 NOTE — H&P ADULT - HISTORY OF PRESENT ILLNESS
This is a 73F with history of HTN and LVOT obstruction who presents to the hospital with complaints of a persistent cough, hoarseness and chest pain radiating down her L arm. Said that her symptoms initially started about 2 weeks ago with a persistent cough with body aches, chills, and URI symptoms. She came to [facility] on 4/7 for evaluation of this after it failed to improve over a week and was found to have a RML/RLL PNA and a fever of 100.6 with a HT of 111. She was given IV levofloxacin 750mg with improvement in her symptoms and was discharged home with levofloxacin 750mg PO daily. Said that she has been taking this medication since then but the cough and chills/diaphoresis have continued. Said that today she lost her voice which concerned her and she therefore came to the hospital for re-evaluation. She denies any URI symptoms currently associated with her symptoms. Said that she was given some medications in the ED, including some inhaled medications, and then started to have a severe 10/10, pressure like chest pain with radiation down her L arm. Said that she has had pain like this before and states that this pain usually comes on when she exerts herself but is usually milder in nature. Chart review shows that she was hospitalized at [facility] in September 2018 for similar chest pain and her wqork showed her to have LV apical hypokenesis with some coronary plaques but none which required intervention. Said that she followed up with a cardiologist once since discharge and has not followed up since. She denies any other complaints.    On arrival to the ED, her vitals were T 98.4, P 83, /94, R 18, O2 sat 95% RA. Her lab work did not show any acute abnormalities from prior and her RVP was negative. She had a CTA Chest that showed difuse bilateral ground glass opacities and was negative for a PE. She was given ceftriaxone 1g IVPB, azithromycin 500mg IVPB, solu-medrol 125mg IVP, and duonebs x2. She was then admitted to medicine.

## 2019-04-13 NOTE — H&P ADULT - ASSESSMENT
This is a 73F with history of HTN and LVOT obstruction who presents to the hospital with c/o a persistent cough likely 2/2 bilateral PNA, hoarseness likely 2/2 persistent cough, and chest pain with radiation down L arm.

## 2019-04-13 NOTE — H&P ADULT - PROBLEM SELECTOR PLAN 5
- c/w BB, will avoid use of vasodilators   - Patient seems to have persistent exertional chest pain at home, might need uptitration of her meds or additional meds to help control her symptoms, consider arabella osorio in AM - c/w BB, ASA, and statin therapy  - will avoid use of vasodilators   - Patient seems to have persistent exertional chest pain at home, might need uptitration of her BB or additional meds to help control her symptoms, consider arabella osorio in AM

## 2019-04-13 NOTE — H&P ADULT - PROBLEM SELECTOR PLAN 3
- Likely precipitated by tachycardia from receiving duonebs in setting of her LVOT though given her CAD on prior cath might also be plaque related  - Would monitor patient on telemetry, will trend hstrops  - Will obtain repeat TTE to assess for her LV function and status of her LVOT obstruction  - Consider cards eval in AM (was previously seen by Dr. Watkins in hospital) - Suspect precipitated by tachycardia from receiving duonebs in setting of her LVOT though given her CAD on prior cath might also be plaque related  - Would monitor patient on telemetry, will trend hstrops  - Will obtain repeat TTE to assess for her LV function and status of her LVOT obstruction  - Consider cards eval in AM (was previously seen by Dr. Watkins in hospital) - Suspect precipitated by tachycardia from receiving duonebs in setting of her LVOT though given her CAD on prior cath might also be plaque related but that is less likely  - Would monitor patient on telemetry, will trend hstrops  - Will obtain repeat TTE to assess for her LV function and status of her LVOT obstruction  - Cards eval in AM (was previously seen by Dr. Watkins in hospital)

## 2019-04-13 NOTE — H&P ADULT - NSHPPHYSICALEXAM_GEN_ALL_CORE
Vital Signs Last 24 Hrs  T(C): 37 (12 Apr 2019 23:44), Max: 37 (12 Apr 2019 22:07)  T(F): 98.6 (12 Apr 2019 23:44), Max: 98.6 (12 Apr 2019 22:07)  HR: 90 (12 Apr 2019 23:44) (83 - 90)  BP: 130/82 (12 Apr 2019 23:44) (130/82 - 173/88)  BP(mean): --  RR: 17 (12 Apr 2019 23:44) (16 - 18)  SpO2: 98% (12 Apr 2019 23:44) (95% - 98%)    GENERAL: No acute distress, well-developed  ENT: EOMI, PERRL, conjunctiva and sclera clear, Neck supple, No JVD, moist mucosa  CHEST/LUNG: RLL crackles, no wheezing  BACK: No spinal tenderness  HEART: Regular rate and rhythm; +holosystolic murmur loudest at the apex  ABDOMEN: Soft, Nontender, Nondistended; Bowel sounds present  EXTREMITIES:  No clubbing, cyanosis, or edema  PSYCH: Nl behavior, nl affect  NEUROLOGY: AAOx3, non-focal  SKIN: Normal color, No rashes or lesions

## 2019-04-13 NOTE — H&P ADULT - NSICDXFAMILYHX_GEN_ALL_CORE_FT
FAMILY HISTORY:  Family history of coronary artery disease in brother, Brother passed away from CAD at age 60

## 2019-04-13 NOTE — H&P ADULT - NSHPLABSRESULTS_GEN_ALL_CORE
LABS and ADDITIONAL STUDIES:                        11.1   7.90  )-----------( 281      ( 12 Apr 2019 16:05 )             33.6     04-12    134<L>  |  99  |  15  ----------------------------<  119<H>  4.6   |  25  |  1.00    Ca    9.2      12 Apr 2019 16:05    TPro  7.4  /  Alb  3.3  /  TBili  0.2  /  DBili  x   /  AST  69<H>  /  ALT  88<H>  /  AlkPhos  89  04-12    LIVER FUNCTIONS - ( 12 Apr 2019 16:05 )  Alb: 3.3 g/dL / Pro: 7.4 g/dL / ALK PHOS: 89 u/L / ALT: 88 u/L / AST: 69 u/L / GGT: x           PT/INR - ( 12 Apr 2019 16:05 )   PT: 12.5 SEC;   INR: 1.09     PTT - ( 12 Apr 2019 16:05 )  PTT:36.3 SEC    Troponin T, High Sensitivity (04.12.19 @ 16:05)    Troponin T, High Sensitivity: 19  Serum Pro-Brain Natriuretic Peptide (04.12.19 @ 16:05)    Serum Pro-Brain Natriuretic Peptide: 986.0    RVP - negative    < from: CT Angio Chest w/ IV Cont (04.12.19 @ 19:40) >    FINDINGS:    CHEST:     LUNGS AND LARGE AIRWAYS: Patent central airways.  Diffuse bilateral   patchy groundglass opacities.    PLEURA: No pleural effusion.    VESSELS: Limited evaluation due to respiratory motion artifact. No main,   right, left, lobar pulmonary embolism. Limited evaluation of subsegmental   pulmonary arteries due to motion artifact.    HEART: Mild Cardiomegaly No pericardial effusion.    MEDIASTINUM AND LYUDMILA: No lymphadenopathy.    CHEST WALL AND LOWER NECK: Within normal limits.    VISUALIZED UPPER ABDOMEN: Within normal limits.    BONES: Degenerative changes.    IMPRESSION:     No main, right, left, lobar pulmonary embolism. Limited evaluation of   subsegmental pulmonary arteries due to motion artifact.    Diffuse bilateral patchy groundglass opacities may represent infection   versus edema.    < end of copied text > LABS and ADDITIONAL STUDIES:                        11.1   7.90  )-----------( 281      ( 12 Apr 2019 16:05 )             33.6     04-12    134<L>  |  99  |  15  ----------------------------<  119<H>  4.6   |  25  |  1.00    Ca    9.2      12 Apr 2019 16:05    TPro  7.4  /  Alb  3.3  /  TBili  0.2  /  DBili  x   /  AST  69<H>  /  ALT  88<H>  /  AlkPhos  89  04-12    LIVER FUNCTIONS - ( 12 Apr 2019 16:05 )  Alb: 3.3 g/dL / Pro: 7.4 g/dL / ALK PHOS: 89 u/L / ALT: 88 u/L / AST: 69 u/L / GGT: x           PT/INR - ( 12 Apr 2019 16:05 )   PT: 12.5 SEC;   INR: 1.09     PTT - ( 12 Apr 2019 16:05 )  PTT:36.3 SEC    Troponin T, High Sensitivity (04.12.19 @ 16:05)    Troponin T, High Sensitivity: 19  Serum Pro-Brain Natriuretic Peptide (04.12.19 @ 16:05)    Serum Pro-Brain Natriuretic Peptide: 986.0    RVP - negative    < from: CT Angio Chest w/ IV Cont (04.12.19 @ 19:40) >    FINDINGS:    CHEST:     LUNGS AND LARGE AIRWAYS: Patent central airways.  Diffuse bilateral   patchy groundglass opacities.    PLEURA: No pleural effusion.    VESSELS: Limited evaluation due to respiratory motion artifact. No main,   right, left, lobar pulmonary embolism. Limited evaluation of subsegmental   pulmonary arteries due to motion artifact.    HEART: Mild Cardiomegaly No pericardial effusion.    MEDIASTINUM AND LYUDMILA: No lymphadenopathy.    CHEST WALL AND LOWER NECK: Within normal limits.    VISUALIZED UPPER ABDOMEN: Within normal limits.    BONES: Degenerative changes.    IMPRESSION:     No main, right, left, lobar pulmonary embolism. Limited evaluation of   subsegmental pulmonary arteries due to motion artifact.    Diffuse bilateral patchy groundglass opacities may represent infection   versus edema.    < end of copied text >    EKG - Sinus tachycardia at 111, nl axis, QTc 476, TWI III (old)

## 2019-04-13 NOTE — H&P ADULT - PROBLEM SELECTOR PLAN 2
- Likely 2/2 persistent coughing as the hoarseness started almost 2 weeks after the onset of her symptoms  - could place on robitussin prn for cough suppression and monitor

## 2019-04-13 NOTE — H&P ADULT - NSICDXPASTMEDICALHX_GEN_ALL_CORE_FT
PAST MEDICAL HISTORY:  HTN (hypertension)     Left ventricular outflow tract obstruction     Spinal stenosis

## 2019-04-13 NOTE — H&P ADULT - NSHPREVIEWOFSYSTEMS_GEN_ALL_CORE
REVIEW OF SYSTEMS:    CONSTITUTIONAL: +subjective fevers, +chills, +diaphoresis, no malaise  EYES: No visual changes or eye discharge  ENT: +hoarseness, No rhinorrhea or sore throat  NECK: No pain or stiffness  RESPIRATORY: +Cough, no sputum production; No shortness of breath  CARDIOVASCULAR: +Chest pain with radiation down L arm, no palpitations, No lower extremity edema  GASTROINTESTINAL: No abdominal or epigastric pain. No nausea, vomiting, or hematemesis; No diarrhea or constipation. No melena or hematochezia.  BACK: No back pain  GENITOURINARY: No dysuria, frequency or hematuria  NEUROLOGICAL: No numbness or weakness  SKIN: No itching, burning, rashes, or lesions

## 2019-04-13 NOTE — CONSULT NOTE ADULT - SUBJECTIVE AND OBJECTIVE BOX
Cardiovascular Disease Initial Evaluation    CHIEF COMPLAINT: Cough and chest pain    HISTORY OF PRESENT ILLNESS:  This is 73 year old woman with HTN, HLD and systolic CHF due to Takotsubo cardiomyopathy who presented to [facility] on 4/12/2019 with persistent cough and chest pain. Ms. Rodriguez had these symptoms for the past two weeks. She presented to [facility] ED and was discharged with PO ABx, but symptoms did not le, therefore she presented again.   Ms. Rodriguez was admitted at [facility] in 9/2018 for NSTEMI. At that time, she was found to have severe LV dysfunction with LVOT obstruction. Coronary cath revealed mild CAD and she was diagnosed with Takotsubo cardiomyopathy.   At present, she denies chest pain or SOB.     Allergies  sulfa drugs (Swelling)        MEDICATIONS:  aspirin  chewable 81 milliGRAM(s) Oral daily  metoprolol tartrate 50 milliGRAM(s) Oral two times a day  azithromycin  IVPB 500 milliGRAM(s) IV Intermittent every 24 hours  cefTRIAXone   IVPB 1 Gram(s) IV Intermittent every 24 hours  guaiFENesin    Syrup 200 milliGRAM(s) Oral every 6 hours PRN    melatonin 3 milliGRAM(s) Oral at bedtime PRN      atorvastatin 40 milliGRAM(s) Oral at bedtime        PAST MEDICAL & SURGICAL HISTORY:  Left ventricular outflow tract obstruction  Spinal stenosis  HTN (hypertension)  History of back surgery  Status post LASIK surgery of both eyes      FAMILY HISTORY:  Family history of coronary artery disease in brother: Brother passed away from CAD at age 60      SOCIAL HISTORY:    Non-smoker        REVIEW OF SYSTEMS:  See HPI, otherwise complete 10 point review of systems negative      PHYSICAL EXAM:  T(C): 36.6 (04-13-19 @ 05:37), Max: 37 (04-12-19 @ 22:07)  HR: 72 (04-13-19 @ 05:37) (72 - 90)  BP: 157/70 (04-13-19 @ 05:37) (130/82 - 173/88)  RR: 17 (04-13-19 @ 05:37) (16 - 18)  SpO2: 97% (04-13-19 @ 05:37) (95% - 98%)  Wt(kg): --  I&O's Summary      Appearance: No Acute Distress	  HEENT:  Normal oral mucosa, PERRL, EOMI	  Cardiovascular: Normal S1 S2, No JVD, No murmurs/rubs/gallops  Respiratory: Lungs clear to auscultation bilaterally  Gastrointestinal:  Soft, Non-tender, + BS	  Skin: No rashes, No ecchymoses, No cyanosis	  Neurologic: Non-focal  Extremities: No clubbing, cyanosis or edema  Vascular: Peripheral pulses palpable 2+ bilaterally  Psychiatry: A & O x 3, Mood & affect appropriate    Laboratory Data:	 	    CBC Full  -  ( 13 Apr 2019 05:45 )  WBC Count : 7.77 K/uL  Hemoglobin : 11.3 g/dL  Hematocrit : 34.5 %  Platelet Count - Automated : 286 K/uL  Mean Cell Volume : 96.1 fL  Mean Cell Hemoglobin : 31.5 pg  Mean Cell Hemoglobin Concentration : 32.8 %  Auto Neutrophil # : 6.28 K/uL  Auto Lymphocyte # : 1.17 K/uL  Auto Monocyte # : 0.24 K/uL  Auto Eosinophil # : 0.00 K/uL  Auto Basophil # : 0.01 K/uL  Auto Neutrophil % : 80.8 %  Auto Lymphocyte % : 15.1 %  Auto Monocyte % : 3.1 %  Auto Eosinophil % : 0.0 %  Auto Basophil % : 0.1 %    04-13    132<L>  |  96<L>  |  12  ----------------------------<  192<H>  4.5   |  23  |  0.58  04-12    134<L>  |  99  |  15  ----------------------------<  119<H>  4.6   |  25  |  1.00    Ca    9.3      13 Apr 2019 05:45  Ca    9.2      12 Apr 2019 16:05  Phos  2.7     04-13  Mg     2.4     04-13    TPro  7.4  /  Alb  3.3  /  TBili  0.2  /  DBili  x   /  AST  69<H>  /  ALT  88<H>  /  AlkPhos  89  04-12      proBNP: Serum Pro-Brain Natriuretic Peptide: 986.0 pg/mL (04-12 @ 16:05)      Interpretation of Telemetry: Sinus	    ECG:  Sinus tachycardia; non-specific ST changes.       Assessment:  73 year old woman with HTN, HLD and systolic CHF due to Takotsubo cardiomyopathy presents with PNA.    Plan of Care:    #Chest pain-  Ms. Rodriguez has ruled out for ACS and EKG is sinus tachycardia with non-specific ST changes.  Chest pain likely secondary to PNA.  Continue ASA and statin for known non-obstructive CAD as per angiogram in 9/2018.    #Compensated systolic CHF-  Due to non-ischemic stress induced cardiomyopathy in 9/2018.  Patient euvolemic on exam.  Check TTE to assess for improvement in LV function.  No ACEi/ARB due to LVOT obstruction seen on last echo.    #HTN-  BP acceptable at present.    Care discussed at length with Ms. Rodriguez.     62 minutes spent on total encounter; more than 50% of the visit was spent counseling and/or coordinating care by the attending physician.   	  Jame Watkins MD [facility]  Cardiovascular Diseases  [phone]

## 2019-04-13 NOTE — H&P ADULT - PROBLEM SELECTOR PLAN 1
- Patient with persistent PNA of bilateral lungs with persistent cough, diaphoresis, chills, and subjective fevers  - On PO levofloxacin at home with inadequate improvement, received CTX/AZT in ED, will c/w both for now  - Will place patient on robitussin prn for her cough  - Monitor for improvement

## 2019-04-14 LAB
ANION GAP SERPL CALC-SCNC: 13 MMO/L — SIGNIFICANT CHANGE UP (ref 7–14)
BASOPHILS # BLD AUTO: 0.03 K/UL — SIGNIFICANT CHANGE UP (ref 0–0.2)
BASOPHILS NFR BLD AUTO: 0.4 % — SIGNIFICANT CHANGE UP (ref 0–2)
BUN SERPL-MCNC: 17 MG/DL — SIGNIFICANT CHANGE UP (ref 7–23)
CALCIUM SERPL-MCNC: 8.6 MG/DL — SIGNIFICANT CHANGE UP (ref 8.4–10.5)
CHLORIDE SERPL-SCNC: 99 MMOL/L — SIGNIFICANT CHANGE UP (ref 98–107)
CO2 SERPL-SCNC: 24 MMOL/L — SIGNIFICANT CHANGE UP (ref 22–31)
CREAT SERPL-MCNC: 0.62 MG/DL — SIGNIFICANT CHANGE UP (ref 0.5–1.3)
EOSINOPHIL # BLD AUTO: 0.05 K/UL — SIGNIFICANT CHANGE UP (ref 0–0.5)
EOSINOPHIL NFR BLD AUTO: 0.6 % — SIGNIFICANT CHANGE UP (ref 0–6)
GLUCOSE SERPL-MCNC: 158 MG/DL — HIGH (ref 70–99)
HCT VFR BLD CALC: 34.5 % — SIGNIFICANT CHANGE UP (ref 34.5–45)
HGB BLD-MCNC: 11.1 G/DL — LOW (ref 11.5–15.5)
IMM GRANULOCYTES NFR BLD AUTO: 0.3 % — SIGNIFICANT CHANGE UP (ref 0–1.5)
LYMPHOCYTES # BLD AUTO: 2.51 K/UL — SIGNIFICANT CHANGE UP (ref 1–3.3)
LYMPHOCYTES # BLD AUTO: 31.9 % — SIGNIFICANT CHANGE UP (ref 13–44)
MAGNESIUM SERPL-MCNC: 2.3 MG/DL — SIGNIFICANT CHANGE UP (ref 1.6–2.6)
MCHC RBC-ENTMCNC: 31.8 PG — SIGNIFICANT CHANGE UP (ref 27–34)
MCHC RBC-ENTMCNC: 32.2 % — SIGNIFICANT CHANGE UP (ref 32–36)
MCV RBC AUTO: 98.9 FL — SIGNIFICANT CHANGE UP (ref 80–100)
MONOCYTES # BLD AUTO: 0.82 K/UL — SIGNIFICANT CHANGE UP (ref 0–0.9)
MONOCYTES NFR BLD AUTO: 10.4 % — SIGNIFICANT CHANGE UP (ref 2–14)
NEUTROPHILS # BLD AUTO: 4.43 K/UL — SIGNIFICANT CHANGE UP (ref 1.8–7.4)
NEUTROPHILS NFR BLD AUTO: 56.4 % — SIGNIFICANT CHANGE UP (ref 43–77)
NRBC # FLD: 0 K/UL — SIGNIFICANT CHANGE UP (ref 0–0)
PLATELET # BLD AUTO: 291 K/UL — SIGNIFICANT CHANGE UP (ref 150–400)
PMV BLD: 10 FL — SIGNIFICANT CHANGE UP (ref 7–13)
POTASSIUM SERPL-MCNC: 3.9 MMOL/L — SIGNIFICANT CHANGE UP (ref 3.5–5.3)
POTASSIUM SERPL-SCNC: 3.9 MMOL/L — SIGNIFICANT CHANGE UP (ref 3.5–5.3)
RBC # BLD: 3.49 M/UL — LOW (ref 3.8–5.2)
RBC # FLD: 14.7 % — HIGH (ref 10.3–14.5)
SODIUM SERPL-SCNC: 136 MMOL/L — SIGNIFICANT CHANGE UP (ref 135–145)
WBC # BLD: 7.86 K/UL — SIGNIFICANT CHANGE UP (ref 3.8–10.5)
WBC # FLD AUTO: 7.86 K/UL — SIGNIFICANT CHANGE UP (ref 3.8–10.5)

## 2019-04-14 PROCEDURE — 93010 ELECTROCARDIOGRAM REPORT: CPT

## 2019-04-14 PROCEDURE — 99233 SBSQ HOSP IP/OBS HIGH 50: CPT

## 2019-04-14 RX ADMIN — CEFTRIAXONE 100 GRAM(S): 500 INJECTION, POWDER, FOR SOLUTION INTRAMUSCULAR; INTRAVENOUS at 05:46

## 2019-04-14 RX ADMIN — Medication 81 MILLIGRAM(S): at 12:18

## 2019-04-14 RX ADMIN — Medication 100 MILLIGRAM(S): at 04:39

## 2019-04-14 RX ADMIN — Medication 50 MILLIGRAM(S): at 17:03

## 2019-04-14 RX ADMIN — Medication 200 MILLIGRAM(S): at 04:39

## 2019-04-14 RX ADMIN — ATORVASTATIN CALCIUM 40 MILLIGRAM(S): 80 TABLET, FILM COATED ORAL at 21:25

## 2019-04-14 RX ADMIN — AZITHROMYCIN 250 MILLIGRAM(S): 500 TABLET, FILM COATED ORAL at 04:40

## 2019-04-14 RX ADMIN — SENNA PLUS 2 TABLET(S): 8.6 TABLET ORAL at 21:25

## 2019-04-14 NOTE — PROVIDER CONTACT NOTE (OTHER) - ACTION/TREATMENT ORDERED:
EKG ordered, RN to notify provider if any change in patient condition or if patient becomes symptomatic

## 2019-04-15 LAB
ANION GAP SERPL CALC-SCNC: 13 MMO/L — SIGNIFICANT CHANGE UP (ref 7–14)
BASOPHILS # BLD AUTO: 0.03 K/UL — SIGNIFICANT CHANGE UP (ref 0–0.2)
BASOPHILS NFR BLD AUTO: 0.4 % — SIGNIFICANT CHANGE UP (ref 0–2)
BUN SERPL-MCNC: 13 MG/DL — SIGNIFICANT CHANGE UP (ref 7–23)
CALCIUM SERPL-MCNC: 9 MG/DL — SIGNIFICANT CHANGE UP (ref 8.4–10.5)
CHLORIDE SERPL-SCNC: 98 MMOL/L — SIGNIFICANT CHANGE UP (ref 98–107)
CO2 SERPL-SCNC: 23 MMOL/L — SIGNIFICANT CHANGE UP (ref 22–31)
CREAT SERPL-MCNC: 0.63 MG/DL — SIGNIFICANT CHANGE UP (ref 0.5–1.3)
EOSINOPHIL # BLD AUTO: 0.11 K/UL — SIGNIFICANT CHANGE UP (ref 0–0.5)
EOSINOPHIL NFR BLD AUTO: 1.6 % — SIGNIFICANT CHANGE UP (ref 0–6)
GLUCOSE SERPL-MCNC: 168 MG/DL — HIGH (ref 70–99)
HCT VFR BLD CALC: 33.2 % — LOW (ref 34.5–45)
HGB BLD-MCNC: 10.8 G/DL — LOW (ref 11.5–15.5)
IMM GRANULOCYTES NFR BLD AUTO: 0.3 % — SIGNIFICANT CHANGE UP (ref 0–1.5)
LYMPHOCYTES # BLD AUTO: 2.46 K/UL — SIGNIFICANT CHANGE UP (ref 1–3.3)
LYMPHOCYTES # BLD AUTO: 34.7 % — SIGNIFICANT CHANGE UP (ref 13–44)
MAGNESIUM SERPL-MCNC: 2.3 MG/DL — SIGNIFICANT CHANGE UP (ref 1.6–2.6)
MCHC RBC-ENTMCNC: 31.4 PG — SIGNIFICANT CHANGE UP (ref 27–34)
MCHC RBC-ENTMCNC: 32.5 % — SIGNIFICANT CHANGE UP (ref 32–36)
MCV RBC AUTO: 96.5 FL — SIGNIFICANT CHANGE UP (ref 80–100)
MONOCYTES # BLD AUTO: 0.86 K/UL — SIGNIFICANT CHANGE UP (ref 0–0.9)
MONOCYTES NFR BLD AUTO: 12.1 % — SIGNIFICANT CHANGE UP (ref 2–14)
NEUTROPHILS # BLD AUTO: 3.61 K/UL — SIGNIFICANT CHANGE UP (ref 1.8–7.4)
NEUTROPHILS NFR BLD AUTO: 50.9 % — SIGNIFICANT CHANGE UP (ref 43–77)
NRBC # FLD: 0 K/UL — SIGNIFICANT CHANGE UP (ref 0–0)
PLATELET # BLD AUTO: 278 K/UL — SIGNIFICANT CHANGE UP (ref 150–400)
PMV BLD: 9.6 FL — SIGNIFICANT CHANGE UP (ref 7–13)
POTASSIUM SERPL-MCNC: 4.1 MMOL/L — SIGNIFICANT CHANGE UP (ref 3.5–5.3)
POTASSIUM SERPL-SCNC: 4.1 MMOL/L — SIGNIFICANT CHANGE UP (ref 3.5–5.3)
RBC # BLD: 3.44 M/UL — LOW (ref 3.8–5.2)
RBC # FLD: 14.7 % — HIGH (ref 10.3–14.5)
SODIUM SERPL-SCNC: 134 MMOL/L — LOW (ref 135–145)
WBC # BLD: 7.09 K/UL — SIGNIFICANT CHANGE UP (ref 3.8–10.5)
WBC # FLD AUTO: 7.09 K/UL — SIGNIFICANT CHANGE UP (ref 3.8–10.5)

## 2019-04-15 PROCEDURE — 93306 TTE W/DOPPLER COMPLETE: CPT | Mod: 26

## 2019-04-15 PROCEDURE — 99233 SBSQ HOSP IP/OBS HIGH 50: CPT

## 2019-04-15 RX ADMIN — ATORVASTATIN CALCIUM 40 MILLIGRAM(S): 80 TABLET, FILM COATED ORAL at 22:12

## 2019-04-15 RX ADMIN — AZITHROMYCIN 250 MILLIGRAM(S): 500 TABLET, FILM COATED ORAL at 04:54

## 2019-04-15 RX ADMIN — Medication 100 MILLIGRAM(S): at 22:13

## 2019-04-15 RX ADMIN — Medication 50 MILLIGRAM(S): at 17:34

## 2019-04-15 RX ADMIN — SENNA PLUS 2 TABLET(S): 8.6 TABLET ORAL at 22:12

## 2019-04-15 RX ADMIN — Medication 81 MILLIGRAM(S): at 17:34

## 2019-04-15 RX ADMIN — CEFTRIAXONE 100 GRAM(S): 500 INJECTION, POWDER, FOR SOLUTION INTRAMUSCULAR; INTRAVENOUS at 06:04

## 2019-04-15 RX ADMIN — Medication 3 MILLIGRAM(S): at 22:13

## 2019-04-16 ENCOUNTER — TRANSCRIPTION ENCOUNTER (OUTPATIENT)
Age: 74
End: 2019-04-16

## 2019-04-16 VITALS
DIASTOLIC BLOOD PRESSURE: 68 MMHG | TEMPERATURE: 98 F | HEART RATE: 67 BPM | SYSTOLIC BLOOD PRESSURE: 141 MMHG | RESPIRATION RATE: 17 BRPM | OXYGEN SATURATION: 98 %

## 2019-04-16 LAB
ANION GAP SERPL CALC-SCNC: 11 MMO/L — SIGNIFICANT CHANGE UP (ref 7–14)
BUN SERPL-MCNC: 11 MG/DL — SIGNIFICANT CHANGE UP (ref 7–23)
CALCIUM SERPL-MCNC: 9.3 MG/DL — SIGNIFICANT CHANGE UP (ref 8.4–10.5)
CHLORIDE SERPL-SCNC: 101 MMOL/L — SIGNIFICANT CHANGE UP (ref 98–107)
CO2 SERPL-SCNC: 24 MMOL/L — SIGNIFICANT CHANGE UP (ref 22–31)
CREAT SERPL-MCNC: 0.62 MG/DL — SIGNIFICANT CHANGE UP (ref 0.5–1.3)
GLUCOSE SERPL-MCNC: 125 MG/DL — HIGH (ref 70–99)
HCT VFR BLD CALC: 36.5 % — SIGNIFICANT CHANGE UP (ref 34.5–45)
HGB BLD-MCNC: 11.8 G/DL — SIGNIFICANT CHANGE UP (ref 11.5–15.5)
MAGNESIUM SERPL-MCNC: 2.3 MG/DL — SIGNIFICANT CHANGE UP (ref 1.6–2.6)
MCHC RBC-ENTMCNC: 31.9 PG — SIGNIFICANT CHANGE UP (ref 27–34)
MCHC RBC-ENTMCNC: 32.3 % — SIGNIFICANT CHANGE UP (ref 32–36)
MCV RBC AUTO: 98.6 FL — SIGNIFICANT CHANGE UP (ref 80–100)
NRBC # FLD: 0 K/UL — SIGNIFICANT CHANGE UP (ref 0–0)
PHOSPHATE SERPL-MCNC: 3 MG/DL — SIGNIFICANT CHANGE UP (ref 2.5–4.5)
PLATELET # BLD AUTO: 345 K/UL — SIGNIFICANT CHANGE UP (ref 150–400)
PMV BLD: 9.8 FL — SIGNIFICANT CHANGE UP (ref 7–13)
POTASSIUM SERPL-MCNC: 4.2 MMOL/L — SIGNIFICANT CHANGE UP (ref 3.5–5.3)
POTASSIUM SERPL-SCNC: 4.2 MMOL/L — SIGNIFICANT CHANGE UP (ref 3.5–5.3)
RBC # BLD: 3.7 M/UL — LOW (ref 3.8–5.2)
RBC # FLD: 14.8 % — HIGH (ref 10.3–14.5)
SODIUM SERPL-SCNC: 136 MMOL/L — SIGNIFICANT CHANGE UP (ref 135–145)
WBC # BLD: 9.56 K/UL — SIGNIFICANT CHANGE UP (ref 3.8–10.5)
WBC # FLD AUTO: 9.56 K/UL — SIGNIFICANT CHANGE UP (ref 3.8–10.5)

## 2019-04-16 PROCEDURE — 99239 HOSP IP/OBS DSCHRG MGMT >30: CPT

## 2019-04-16 RX ADMIN — CEFTRIAXONE 100 GRAM(S): 500 INJECTION, POWDER, FOR SOLUTION INTRAMUSCULAR; INTRAVENOUS at 05:04

## 2019-04-16 RX ADMIN — AZITHROMYCIN 250 MILLIGRAM(S): 500 TABLET, FILM COATED ORAL at 05:02

## 2019-04-16 RX ADMIN — Medication 81 MILLIGRAM(S): at 17:26

## 2019-04-16 RX ADMIN — Medication 50 MILLIGRAM(S): at 05:03

## 2019-04-16 RX ADMIN — Medication 50 MILLIGRAM(S): at 17:26

## 2019-04-16 NOTE — PROGRESS NOTE ADULT - PROBLEM SELECTOR PLAN 4
- c/w metoprolol  - No ACEi/ARB due to LVOT obstruction seen on last echo.

## 2019-04-16 NOTE — PROGRESS NOTE ADULT - PROBLEM SELECTOR PROBLEM 1
Pneumonia of both lungs due to infectious organism, unspecified part of lung

## 2019-04-16 NOTE — PROGRESS NOTE ADULT - PROBLEM SELECTOR PLAN 1
- Patient with persistent PNA of bilateral lungs with persistent cough, diaphoresis, chills, and subjective fevers  - On PO levofloxacin at home with inadequate improvement, received CTX/AZT in ED, will c/w both for now  - improved d/c home today with vantin and zithromax po to complete abx treatment, d/c planning time spent in coordination 40 mts ( discussion with pt, pt's grandson, PA, preparing d/c summary and plan)  - RVP negative
- Patient with persistent PNA of bilateral lungs with persistent cough, diaphoresis, chills, and subjective fevers  - On PO levofloxacin at home with inadequate improvement, received CTX/AZT in ED, will c/w both for now  - Monitor for improvement  - RVP negative

## 2019-04-16 NOTE — PROGRESS NOTE ADULT - PROBLEM SELECTOR PLAN 3
- Chest pain likely 2/2 PNA   - Cardiology consult appreciated, ACS ruled out   - Would monitor patient on telemetry, hstrops negative   - Will obtain repeat TTE to assess for her LV function and status of her LVOT obstruction
- Chest pain likely 2/2 PNA   - Cardiology consult appreciated, ACS ruled out   - Would monitor patient on telemetry, hstrops negative   - Will obtain repeat TTE to assess for her LV function and status of her LVOT obstruction, f/u echo
- Chest pain likely 2/2 PNA   - Cardiology consult appreciated, ACS ruled out   - Would monitor patient on telemetry, hstrops negative   - echo with nl lv fn
- Chest pain likely 2/2 PNA   - Cardiology consult appreciated, ACS ruled out   - Would monitor patient on telemetry, hstrops negative   - Will obtain repeat TTE to assess for her LV function and status of her LVOT obstruction

## 2019-04-16 NOTE — DISCHARGE NOTE PROVIDER - HOSPITAL COURSE
HPI:    This is a 73F with history of HTN and LVOT obstruction who presents to the hospital with complaints of a persistent cough, hoarseness and chest pain radiating down her L arm. Said that her symptoms initially started about 2 weeks ago with a persistent cough with body aches, chills, and URI symptoms. She came to [facility] on 4/7 for evaluation of this after it failed to improve over a week and was found to have a RML/RLL PNA and a fever of 100.6 with a HT of 111. She was given IV levofloxacin 750mg with improvement in her symptoms and was discharged home with levofloxacin 750mg PO daily. Said that she has been taking this medication since then but the cough and chills/diaphoresis have continued. Said that today she lost her voice which concerned her and she therefore came to the hospital for re-evaluation. She denies any URI symptoms currently associated with her symptoms. Said that she was given some medications in the ED, including some inhaled medications, and then started to have a severe 10/10, pressure like chest pain with radiation down her L arm. Said that she has had pain like this before and states that this pain usually comes on when she exerts herself but is usually milder in nature. Chart review shows that she was hospitalized at [facility] in September 2018 for similar chest pain and her wqork showed her to have LV apical hypokenesis with some coronary plaques but none which required intervention. Said that she followed up with a cardiologist once since discharge and has not followed up since. She denies any other complaints.        On arrival to the ED, her vitals were T 98.4, P 83, /94, R 18, O2 sat 95% RA. Her lab work did not show any acute abnormalities from prior and her RVP was negative. She had a CTA Chest that showed difuse bilateral ground glass opacities and was negative for a PE. She was given ceftriaxone 1g IVPB, azithromycin 500mg IVPB, solu-medrol 125mg IVP, and duonebs x2. She was then admitted to medicine. (13 Apr 2019 01:45)        On admission:    CE NEg X 2     EKG Sinus Tach 111    ProBNP 981    CXR- No main, right, left, lobar pulmonary embolism. Limited evaluation of subsegmental pulmonary arteries due to motion artifact.  Diffuse bilateral patchy groundglass opacities may represent infection versus edema.    CT Angio of Chest- No main, right, left, lobar pulmonary embolism. Limited evaluation of subsegmental pulmonary arteries due to motion artifact.  Diffuse bilateral patchy groundglass opacities may represent infection versus edema.    RVP Negative    4/15: Echo EF 74% 1. Mitral annular calcification and calcified mitral leaflets with normal diastolic opening. Moderate mitral regurgitation. 2. Calcified trileaflet aortic valve with normal opening. 3. Normal left ventricular internal dimensions and wall thicknesses. 4. Normal left ventricular systolic function. No segmental wall motion abnormalities. 5. Normal right ventricular size and function. *** Compared with echocardiogram of 9/5/2018, LV function has significantly improved.        Evaluated by cardiology: 73 year old woman with HTN, HLD and systolic CHF due to Takotsubo cardiomyopathy presents with PNA.    #Chest pain-Ms. Rodriguez has ruled out for ACS and EKG is sinus tachycardia with non-specific ST changes.Chest pain likely secondary to PNA. Continue ASA and statin for known non-obstructive CAD as per angiogram in 9/2018.    #Compensated systolic CHF-Due to non-ischemic stress induced cardiomyopathy in 9/2018. Repeat TTE shows resolution of cardiomyopathy. No change in cardiac management at this time.     #HTN-BP acceptable at present.No further inpatient cardiac work up warranted at this time.         Evaluated by medicine: 73F with history of HTN, systolic CHF due to Takotsubo cardiomyopathy and LVOT obstruction who presents to the hospital with c/o a persistent cough likely 2/2 bilateral PNA.     -Pneumonia of both lungs due to infectious organism, unspecified part of lung: - Patient with persistent PNA of bilateral lungs with persistent cough, diaphoresis, chills, and subjective fevers - On PO levofloxacin at home with inadequate improvement, received CTX/AZT in ED, continued on CTX & azithromycin while hospitalized.  RVP negative.  Discharge home on antibiotics in order to complete 7 day course total (vantin 200mg bid x 3 more days & azithromycin x 1 more day).     -Hoarseness of voice: - Likely 2/2 persistent coughing as the hoarseness started almost 2 weeks after the onset of her symptoms - cont hycodan, c/w robitussin, tessalon perles, and xopenex prn for her cough.     -Chest pain likely 2/2 PNA - Cardiology consult appreciated, ACS ruled out - Would monitor patient on telemetry, hstrops negative - Echo as above.  Cleared by cardiology for discharge home.     -Essential hypertension: - c/w metoprolol - No ACEi/ARB due to LVOT obstruction seen on last echo.     -Left ventricular outflow tract obstruction: - c/w BB, ASA, and statin therapy - will avoid use of vasodilators/ ACE/ARB - Cards recs appreciated - TTE as above.        Patient cleared for discharge home with outpatient f/u with PCP & cardiology. HPI:    This is a 73F with history of HTN and LVOT obstruction who presents to the hospital with complaints of a persistent cough, hoarseness and chest pain radiating down her L arm. Said that her symptoms initially started about 2 weeks ago with a persistent cough with body aches, chills, and URI symptoms. She came to [facility] on 4/7 for evaluation of this after it failed to improve over a week and was found to have a RML/RLL PNA and a fever of 100.6 with a HT of 111. She was given IV levofloxacin 750mg with improvement in her symptoms and was discharged home with levofloxacin 750mg PO daily. Said that she has been taking this medication since then but the cough and chills/diaphoresis have continued. Said that today she lost her voice which concerned her and she therefore came to the hospital for re-evaluation. She denies any URI symptoms currently associated with her symptoms. Said that she was given some medications in the ED, including some inhaled medications, and then started to have a severe 10/10, pressure like chest pain with radiation down her L arm. Said that she has had pain like this before and states that this pain usually comes on when she exerts herself but is usually milder in nature. Chart review shows that she was hospitalized at [facility] in September 2018 for similar chest pain and her wqork showed her to have LV apical hypokenesis with some coronary plaques but none which required intervention. Said that she followed up with a cardiologist once since discharge and has not followed up since. She denies any other complaints.        On arrival to the ED, her vitals were T 98.4, P 83, /94, R 18, O2 sat 95% RA. Her lab work did not show any acute abnormalities from prior and her RVP was negative. She had a CTA Chest that showed difuse bilateral ground glass opacities and was negative for a PE. She was given ceftriaxone 1g IVPB, azithromycin 500mg IVPB, solu-medrol 125mg IVP, and duonebs x2. She was then admitted to medicine. (13 Apr 2019 01:45)        On admission:    CE NEg X 2     EKG Sinus Tach 111    ProBNP 981    CXR- No main, right, left, lobar pulmonary embolism. Limited evaluation of subsegmental pulmonary arteries due to motion artifact.  Diffuse bilateral patchy groundglass opacities may represent infection versus edema.    CT Angio of Chest- No main, right, left, lobar pulmonary embolism. Limited evaluation of subsegmental pulmonary arteries due to motion artifact.  Diffuse bilateral patchy groundglass opacities may represent infection versus edema.    RVP Negative    4/15: Echo EF 74% 1. Mitral annular calcification and calcified mitral leaflets with normal diastolic opening. Moderate mitral regurgitation. 2. Calcified trileaflet aortic valve with normal opening. 3. Normal left ventricular internal dimensions and wall thicknesses. 4. Normal left ventricular systolic function. No segmental wall motion abnormalities. 5. Normal right ventricular size and function. *** Compared with echocardiogram of 9/5/2018, LV function has significantly improved.        Evaluated by cardiology: 73 year old woman with HTN, HLD and systolic CHF due to Takotsubo cardiomyopathy presents with PNA.    #Chest pain-Ms. Rodriguez has ruled out for ACS and EKG is sinus tachycardia with non-specific ST changes.Chest pain likely secondary to PNA. Continue ASA and statin for known non-obstructive CAD as per angiogram in 9/2018.    #Compensated systolic CHF-Due to non-ischemic stress induced cardiomyopathy in 9/2018. Repeat TTE shows resolution of cardiomyopathy. No change in cardiac management at this time.     #HTN-BP acceptable at present.No further inpatient cardiac work up warranted at this time.         Evaluated by medicine: 73F with history of HTN, systolic CHF due to Takotsubo cardiomyopathy and LVOT obstruction who presents to the hospital with c/o a persistent cough likely 2/2 bilateral PNA.     -Pneumonia of both lungs due to infectious organism, unspecified part of lung: - Patient with persistent PNA of bilateral lungs with persistent cough, diaphoresis, chills, and subjective fevers - On PO levofloxacin at home with inadequate improvement, received CTX/AZT in ED, continued on CTX & azithromycin while hospitalized.  RVP negative.  Discharge home on antibiotics in order to complete 7 day course total (vantin 200mg bid x 3 more days & azithromycin x 1 more day).     -Hoarseness of voice: - Likely 2/2 persistent coughing as the hoarseness started almost 2 weeks after the onset of her symptoms - cont hycodan, c/w robitussin, tessalon perles, and xopenex prn for her cough.     -Chest pain likely 2/2 PNA - Cardiology consult appreciated, ACS ruled out - Would monitor patient on telemetry, hstrops negative - Echo as above.  Cleared by cardiology for discharge home.     -Essential hypertension: - c/w metoprolol - No ACEi/ARB due to LVOT obstruction seen on last echo.     -Left ventricular outflow tract obstruction: - c/w BB, ASA, and statin therapy - will avoid use of vasodilators/ ACE/ARB - Cards recs appreciated - TTE as above.        Patient cleared for discharge home with outpatient f/u with PCP & cardiology. Discussed with pt's grandson and advised to f/u as outpt.

## 2019-04-16 NOTE — DISCHARGE NOTE PROVIDER - NSDCCPCAREPLAN_GEN_ALL_CORE_FT
PRINCIPAL DISCHARGE DIAGNOSIS  Diagnosis: Pneumonia  Assessment and Plan of Treatment: Continue antibiotics as prescribed.  Take vantin as prescribed for 3 more days.  Take azithromycin as prescribed for 1 more day.  Follow up with your PCP within 1 week; call for appointment.      SECONDARY DISCHARGE DIAGNOSES  Diagnosis: Atypical chest pain  Assessment and Plan of Treatment: You chest pain was likely secondary to your pneumonia.  Continue medications as prescribed including your antibiotics.  Follow up with your PCP & cardiologist within 1-2 weeks; call for appointments.    Diagnosis: CAD (coronary artery disease)  Assessment and Plan of Treatment: Continue aspirin and do not stop unless instructed by your physician.  Continue low salt, fat, cholesterol and carbohydrate diet. Follow up with cardiologist and primary care physician's routine appointment.    Diagnosis: Congestive heart failure  Assessment and Plan of Treatment: Low salt diet, fluid restriction to 1500 ml daily, monitor your fluid intake and weight daily, exercise as tolerated 30 minutes daily, and follow up with your physician within 1 to 2 weeks.    Diagnosis: Mitral regurgitation  Assessment and Plan of Treatment: Continue medications as prescribed.  Follow up with cardiology within 1-2 weeks; call for appointment.  You will need routine transthoracic echocardiograms as outpatient to monitor your mitral heart valve.    Diagnosis: Essential hypertension  Assessment and Plan of Treatment: Low sodium and fat diet, continue anti-hypertensive medications, and follow up with primary care physician.

## 2019-04-16 NOTE — PROGRESS NOTE ADULT - PROBLEM SELECTOR PLAN 2
- Likely 2/2 persistent coughing as the hoarseness started almost 2 weeks after the onset of her symptoms  - cont hycodan, c/w robitussin, tessalon perles, and xopenex prn for her cough
- Likely 2/2 persistent coughing as the hoarseness started almost 2 weeks after the onset of her symptoms  - Will place patient on robitussin, tessalon perles, and xopenex prn for her cough
- Likely 2/2 persistent coughing as the hoarseness started almost 2 weeks after the onset of her symptoms  - cont hycodan, c/w robitussin, tessalon perles, and xopenex prn for her cough
- Likely 2/2 persistent coughing as the hoarseness started almost 2 weeks after the onset of her symptoms  - Will start on hycodan today, c/w robitussin, tessalon perles, and xopenex prn for her cough

## 2019-04-16 NOTE — PROGRESS NOTE ADULT - ASSESSMENT
73F with history of HTN, systolic CHF due to Takotsubo cardiomyopathy and LVOT obstruction who presents to the hospital with c/o a persistent cough likely 2/2 bilateral PNA.

## 2019-04-16 NOTE — DISCHARGE NOTE PROVIDER - CARE PROVIDER_API CALL
Jame Watkins)  Internal Medicine  [address] [address] [place] [address]  Phone: [phone]  Fax: [phone]  Follow Up Time:     Prasanna Cheema  PCP  Phone: (   )    -  Fax: (   )    -  Follow Up Time:

## 2019-04-16 NOTE — DISCHARGE NOTE NURSING/CASE MANAGEMENT/SOCIAL WORK - NSDCDPATPORTLINK_GEN_ALL_CORE
You can access the [facility] Portal, offered by [facility], by registering with the following website: [email or web address]

## 2019-04-16 NOTE — PROGRESS NOTE ADULT - SUBJECTIVE AND OBJECTIVE BOX
Cardiovascular Disease Progress Note    Overnight events: No acute events overnight. Ms. Rodriguez continues to have cough. No chest pain.     Otherwise review of systems negative    Objective Findings:  T(C): 36.7 (04-15-19 @ 03:22), Max: 36.9 (04-14-19 @ 17:03)  HR: 55 (04-15-19 @ 06:30) (55 - 68)  BP: 129/62 (04-15-19 @ 06:30) (92/52 - 147/71)  RR: 18 (04-15-19 @ 03:22) (16 - 18)  SpO2: 99% (04-15-19 @ 03:22) (99% - 100%)  Wt(kg): --  Daily     Daily       Physical Exam:  Gen: NAD  HEENT: EOMI  CV: RRR, normal S1 + S2, no m/r/g  Lungs: CTAB  Abd: soft, non-tender  Ext: No edema    Telemetry: Sinus; ventricular bigeminy yesterday morning.     Laboratory Data:                        10.8   7.09  )-----------( 278      ( 15 Apr 2019 05:30 )             33.2     04-15    134<L>  |  98  |  13  ----------------------------<  168<H>  4.1   |  23  |  0.63    Ca    9.0      15 Apr 2019 05:30  Mg     2.3     04-15                Inpatient Medications:  MEDICATIONS  (STANDING):  aspirin  chewable 81 milliGRAM(s) Oral daily  atorvastatin 40 milliGRAM(s) Oral at bedtime  azithromycin  IVPB 500 milliGRAM(s) IV Intermittent every 24 hours  cefTRIAXone   IVPB 1 Gram(s) IV Intermittent every 24 hours  metoprolol tartrate 50 milliGRAM(s) Oral two times a day  senna 2 Tablet(s) Oral at bedtime      Assessment: 73 year old woman with HTN, HLD and systolic CHF due to Takotsubo cardiomyopathy presents with PNA.    Plan of Care:    #Chest pain-  Ms. Rodriguez has ruled out for ACS and EKG is sinus tachycardia with non-specific ST changes.  Chest pain likely secondary to PNA.  Continue ASA and statin for known non-obstructive CAD as per angiogram in 9/2018.    #Compensated systolic CHF-  Due to non-ischemic stress induced cardiomyopathy in 9/2018.  Patient euvolemic on exam.  Check TTE to assess for improvement in LV function.  No ACEi/ARB due to LVOT obstruction seen on last echo.    #Ventricular bigeminy-  Noted on 4/14.  Continue Lopressor.  Awaiting TTE.     #HTN-  BP acceptable at present.    Over 25 minutes spent on total encounter; more than 50% of the visit was spent counseling and/or coordinating care by the attending physician.      Jame Watkins MD [facility]  Cardiovascular Disease  [phone]
Patient is a 73y old  Female who presents with a chief complaint of Persistent cough, hoarseness, chest pain radiating down L arm (14 Apr 2019 08:48)      Subjective:  # [id number]. Patient remains with persistent cough, states SOB is only with coughing. Has voice hoarseness from coughing so much - robitussin not helping, not using xopenex because it makes her anxious. Denies chest pain, fevers, chills.     MEDICATIONS  (STANDING):  aspirin  chewable 81 milliGRAM(s) Oral daily  atorvastatin 40 milliGRAM(s) Oral at bedtime  azithromycin  IVPB 500 milliGRAM(s) IV Intermittent every 24 hours  cefTRIAXone   IVPB 1 Gram(s) IV Intermittent every 24 hours  metoprolol tartrate 50 milliGRAM(s) Oral two times a day  senna 2 Tablet(s) Oral at bedtime    MEDICATIONS  (PRN):  benzonatate 100 milliGRAM(s) Oral three times a day PRN Cough  guaiFENesin    Syrup 200 milliGRAM(s) Oral every 6 hours PRN Cough  HYDROcodone/homatropine Syrup 5 milliLiter(s) Oral every 6 hours PRN Cough  levalbuterol Inhalation 0.63 milliGRAM(s) Inhalation every 6 hours PRN wheeze  melatonin 3 milliGRAM(s) Oral at bedtime PRN Insomnia        Objective:    Vitals: Vital Signs Last 24 Hrs  T(C): 36.8 (04-14-19 @ 12:21), Max: 36.8 (04-13-19 @ 20:25)  T(F): 98.2 (04-14-19 @ 12:21), Max: 98.3 (04-13-19 @ 20:25)  HR: 63 (04-14-19 @ 12:21) (57 - 75)  BP: 126/53 (04-14-19 @ 12:21) (92/52 - 145/68)  BP(mean): --  RR: 16 (04-14-19 @ 12:21) (16 - 18)  SpO2: 99% (04-14-19 @ 12:21) (96% - 99%)            I&O's Summary      PHYSICAL EXAM:  GENERAL: NAD, well-groomed, well-developed  HEAD:  Atraumatic, Normocephalic  NECK: Supple, No JVD, Normal thyroid  CHEST/LUNG: scattered crackles and rhonchi   HEART: Regular rate and rhythm; holosystolic murmur  ABDOMEN: Soft, Nontender, Nondistended; Bowel sounds present  EXTREMITIES:  2+ Peripheral Pulses, No clubbing, cyanosis, or edema  SKIN: No rashes or lesions  NERVOUS SYSTEM:  Alert & Oriented X3, Good concentration; Motor Strength 5/5 B/L upper and lower extremities                                   LABS:  04-14    136  |  99  |  17  ----------------------------<  158<H>  3.9   |  24  |  0.62  04-13    132<L>  |  96<L>  |  12  ----------------------------<  192<H>  4.5   |  23  |  0.58  04-12    134<L>  |  99  |  15  ----------------------------<  119<H>  4.6   |  25  |  1.00    Ca    8.6      14 Apr 2019 06:10  Ca    9.3      13 Apr 2019 05:45  Ca    9.2      12 Apr 2019 16:05  Phos  2.7     04-13  Mg     2.3     04-14    TPro  7.4  /  Alb  3.3  /  TBili  0.2  /  DBili  x   /  AST  69<H>  /  ALT  88<H>  /  AlkPhos  89  04-12      PT/INR - ( 12 Apr 2019 16:05 )   PT: 12.5 SEC;   INR: 1.09          PTT - ( 12 Apr 2019 16:05 )  PTT:36.3 SEC                                        11.1   7.86  )-----------( 291      ( 14 Apr 2019 06:10 )             34.5                         11.3   7.77  )-----------( 286      ( 13 Apr 2019 05:45 )             34.5                         11.1   7.90  )-----------( 281      ( 12 Apr 2019 16:05 )             33.6     CAPILLARY BLOOD GLUCOSE          RADIOLOGY & ADDITIONAL TESTS:    Imaging Personally Reviewed:  [ X] YES  [ ] NO      Consultants involved in case:   Consultant(s) Notes Reviewed:  [ X] YES  [ ] NO:   Care Discussed with Consultants/Other Providers [ ] YES  [ ] NO
Cardiovascular Disease Progress Note    Overnight events: No acute events overnight. Ms. Rodriguez denies chest pain or SOB.    Otherwise review of systems negative    Objective Findings:  T(C): 36.7 (04-16-19 @ 05:00), Max: 36.7 (04-15-19 @ 14:58)  HR: 61 (04-16-19 @ 05:00) (57 - 72)  BP: 152/76 (04-16-19 @ 05:00) (104/65 - 152/76)  RR: 19 (04-16-19 @ 05:00) (19 - 19)  SpO2: 98% (04-16-19 @ 05:00) (97% - 100%)  Wt(kg): --  Daily     Daily       Physical Exam:  Gen: NAD  HEENT: EOMI  CV: RRR, normal S1 + S2, no m/r/g  Lungs: CTAB  Abd: soft, non-tender  Ext: No edema    Telemetry: Sinus    Laboratory Data:                        10.8   7.09  )-----------( 278      ( 15 Apr 2019 05:30 )             33.2     04-16    136  |  101  |  11  ----------------------------<  125<H>  4.2   |  24  |  0.62    Ca    9.3      16 Apr 2019 05:24  Phos  3.0     04-16  Mg     2.3     04-16                Inpatient Medications:  MEDICATIONS  (STANDING):  aspirin  chewable 81 milliGRAM(s) Oral daily  atorvastatin 40 milliGRAM(s) Oral at bedtime  azithromycin  IVPB 500 milliGRAM(s) IV Intermittent every 24 hours  cefTRIAXone   IVPB 1 Gram(s) IV Intermittent every 24 hours  metoprolol tartrate 50 milliGRAM(s) Oral two times a day  senna 2 Tablet(s) Oral at bedtime      Assessment: 73 year old woman with HTN, HLD and systolic CHF due to Takotsubo cardiomyopathy presents with PNA.    Plan of Care:    #Chest pain-  Ms. Rodriguez has ruled out for ACS and EKG is sinus tachycardia with non-specific ST changes.  Chest pain likely secondary to PNA.  Continue ASA and statin for known non-obstructive CAD as per angiogram in 9/2018.    #Compensated systolic CHF-  Due to non-ischemic stress induced cardiomyopathy in 9/2018.  Repeat TTE shows resolution of cardiomyopathy.  No change in cardiac management at this time.     #HTN-  BP acceptable at present.    No further inpatient cardiac work up warranted at this time.     Over 25 minutes spent on total encounter; more than 50% of the visit was spent counseling and/or coordinating care by the attending physician.      Jame Watkins MD [facility]  Cardiovascular Disease  [phone]
Cardiovascular Disease Progress Note    Overnight events: No acute events overnight. Ms. Rodriguez continues to have cough associated with chest pain.   Otherwise review of systems negative    Objective Findings:  T(C): 36.7 (04-14-19 @ 05:31), Max: 36.8 (04-13-19 @ 14:29)  HR: 57 (04-14-19 @ 05:45) (57 - 75)  BP: 110/58 (04-14-19 @ 05:31) (110/58 - 145/68)  RR: 17 (04-14-19 @ 05:31) (17 - 18)  SpO2: 96% (04-14-19 @ 05:31) (96% - 98%)  Wt(kg): --  Daily     Daily       Physical Exam:  Gen: NAD  HEENT: EOMI  CV: RRR, normal S1 + S2, no m/r/g  Lungs: CTAB  Abd: soft, non-tender  Ext: No edema    Telemetry: Sinus; no ectopy    Laboratory Data:                        11.1   7.86  )-----------( 291      ( 14 Apr 2019 06:10 )             34.5     04-14    136  |  99  |  17  ----------------------------<  158<H>  3.9   |  24  |  0.62    Ca    8.6      14 Apr 2019 06:10  Phos  2.7     04-13  Mg     2.3     04-14    TPro  7.4  /  Alb  3.3  /  TBili  0.2  /  DBili  x   /  AST  69<H>  /  ALT  88<H>  /  AlkPhos  89  04-12    PT/INR - ( 12 Apr 2019 16:05 )   PT: 12.5 SEC;   INR: 1.09          PTT - ( 12 Apr 2019 16:05 )  PTT:36.3 SEC          Inpatient Medications:  MEDICATIONS  (STANDING):  aspirin  chewable 81 milliGRAM(s) Oral daily  atorvastatin 40 milliGRAM(s) Oral at bedtime  azithromycin  IVPB 500 milliGRAM(s) IV Intermittent every 24 hours  cefTRIAXone   IVPB 1 Gram(s) IV Intermittent every 24 hours  metoprolol tartrate 50 milliGRAM(s) Oral two times a day  senna 2 Tablet(s) Oral at bedtime      Assessment: 73 year old woman with HTN, HLD and systolic CHF due to Takotsubo cardiomyopathy presents with PNA.    Plan of Care:    #Chest pain-  Ms. Rodriguez has ruled out for ACS and EKG is sinus tachycardia with non-specific ST changes.  Chest pain likely secondary to PNA.  Continue ASA and statin for known non-obstructive CAD as per angiogram in 9/2018.    #Compensated systolic CHF-  Due to non-ischemic stress induced cardiomyopathy in 9/2018.  Patient euvolemic on exam.  Check TTE to assess for improvement in LV function.  No ACEi/ARB due to LVOT obstruction seen on last echo.    #HTN-  BP acceptable at present.    Over 25 minutes spent on total encounter; more than 50% of the visit was spent counseling and/or coordinating care by the attending physician.      Jame Watkins MD [facility]  Cardiovascular Disease  [phone]
Patient is a 73y old  Female who presents with a chief complaint of Persistent cough, hoarseness, chest pain radiating down L arm (13 Apr 2019 08:41)      Subjective: Patient states that she feels better since coming to the hospital, she continues to have non-productive cough. denies chest pain, LE edema, MORENO. Denies sick contacts, n/v/d.     MEDICATIONS  (STANDING):  aspirin  chewable 81 milliGRAM(s) Oral daily  atorvastatin 40 milliGRAM(s) Oral at bedtime  azithromycin  IVPB 500 milliGRAM(s) IV Intermittent every 24 hours  cefTRIAXone   IVPB 1 Gram(s) IV Intermittent every 24 hours  metoprolol tartrate 50 milliGRAM(s) Oral two times a day    MEDICATIONS  (PRN):  benzonatate 100 milliGRAM(s) Oral three times a day PRN Cough  guaiFENesin    Syrup 200 milliGRAM(s) Oral every 6 hours PRN Cough  levalbuterol Inhalation 0.63 milliGRAM(s) Inhalation every 6 hours PRN wheeze  melatonin 3 milliGRAM(s) Oral at bedtime PRN Insomnia        Objective:    Vitals: Vital Signs Last 24 Hrs  T(C): 36.8 (04-13-19 @ 14:29), Max: 37 (04-12-19 @ 22:07)  T(F): 98.3 (04-13-19 @ 14:29), Max: 98.6 (04-12-19 @ 22:07)  HR: 75 (04-13-19 @ 17:28) (72 - 90)  BP: 145/68 (04-13-19 @ 17:28) (130/82 - 173/88)  BP(mean): --  RR: 18 (04-13-19 @ 14:29) (16 - 18)  SpO2: 97% (04-13-19 @ 14:29) (96% - 98%)            I&O's Summary      PHYSICAL EXAM:  GENERAL: NAD, well-groomed, well-developed  HEAD:  Atraumatic, Normocephalic  NECK: Supple, No JVD, Normal thyroid  CHEST/LUNG: scattered crackles and rhonchi   HEART: Regular rate and rhythm; holosystolic murmur  ABDOMEN: Soft, Nontender, Nondistended; Bowel sounds present  EXTREMITIES:  2+ Peripheral Pulses, No clubbing, cyanosis, or edema  SKIN: No rashes or lesions  NERVOUS SYSTEM:  Alert & Oriented X3, Good concentration; Motor Strength 5/5 B/L upper and lower extremities    LABS:  04-13    132<L>  |  96<L>  |  12  ----------------------------<  192<H>  4.5   |  23  |  0.58  04-12    134<L>  |  99  |  15  ----------------------------<  119<H>  4.6   |  25  |  1.00    Ca    9.3      13 Apr 2019 05:45  Ca    9.2      12 Apr 2019 16:05  Phos  2.7     04-13  Mg     2.4     04-13    TPro  7.4  /  Alb  3.3  /  TBili  0.2  /  DBili  x   /  AST  69<H>  /  ALT  88<H>  /  AlkPhos  89  04-12      PT/INR - ( 12 Apr 2019 16:05 )   PT: 12.5 SEC;   INR: 1.09          PTT - ( 12 Apr 2019 16:05 )  PTT:36.3 SEC                                        11.3   7.77  )-----------( 286      ( 13 Apr 2019 05:45 )             34.5                         11.1   7.90  )-----------( 281      ( 12 Apr 2019 16:05 )             33.6     CAPILLARY BLOOD GLUCOSE          RADIOLOGY & ADDITIONAL TESTS:    Imaging Personally Reviewed:  [X ] YES  [ ] NO      Consultants involved in case:   Consultant(s) Notes Reviewed:  [ X] YES  [ ] NO:   Care Discussed with Consultants/Other Providers [ ] YES  [ ] NO
Patient is a 73y old  Female who presents with a chief complaint of Persistent cough, hoarseness, chest pain radiating down L arm (16 Apr 2019 13:22)      SUBJECTIVE / OVERNIGHT EVENTS: Pt seen and examined at 11:10am, no overnight events, cough better, no sob, or chest pain. no other complaints.      MEDICATIONS  (STANDING):  aspirin  chewable 81 milliGRAM(s) Oral daily  atorvastatin 40 milliGRAM(s) Oral at bedtime  azithromycin  IVPB 500 milliGRAM(s) IV Intermittent every 24 hours  cefTRIAXone   IVPB 1 Gram(s) IV Intermittent every 24 hours  metoprolol tartrate 50 milliGRAM(s) Oral two times a day  senna 2 Tablet(s) Oral at bedtime    MEDICATIONS  (PRN):  benzonatate 100 milliGRAM(s) Oral three times a day PRN Cough  guaiFENesin    Syrup 200 milliGRAM(s) Oral every 6 hours PRN Cough  HYDROcodone/homatropine Syrup 5 milliLiter(s) Oral every 6 hours PRN Cough  levalbuterol Inhalation 0.63 milliGRAM(s) Inhalation every 6 hours PRN wheeze  melatonin 3 milliGRAM(s) Oral at bedtime PRN Insomnia      Vital Signs Last 24 Hrs  T(C): 36.7 (16 Apr 2019 17:22), Max: 37.4 (16 Apr 2019 13:20)  T(F): 98 (16 Apr 2019 17:22), Max: 99.3 (16 Apr 2019 13:20)  HR: 67 (16 Apr 2019 17:22) (57 - 67)  BP: 141/68 (16 Apr 2019 17:22) (118/59 - 152/76)  BP(mean): --  RR: 17 (16 Apr 2019 17:22) (17 - 19)  SpO2: 98% (16 Apr 2019 17:22) (96% - 98%)  CAPILLARY BLOOD GLUCOSE        I&O's Summary      PHYSICAL EXAM:  GENERAL: NAD, well-developed  CHEST/LUNG: Clear to auscultation bilaterally; No wheeze  HEART: Regular rate and rhythm  ABDOMEN: Soft, Nontender, Nondistended  EXTREMITIES: no LE edema  PSYCH: Calm  NEUROLOGY: AAOx3  SKIN: No rashes or lesions      LABS:                        11.8   9.56  )-----------( 345      ( 16 Apr 2019 05:24 )             36.5     04-16    136  |  101  |  11  ----------------------------<  125<H>  4.2   |  24  |  0.62    Ca    9.3      16 Apr 2019 05:24  Phos  3.0     04-16  Mg     2.3     04-16                RADIOLOGY & ADDITIONAL TESTS:    Imaging Personally Reviewed:    Consultant(s) Notes Reviewed:      Care Discussed with Consultants/Other Providers:
Patient is a 73y old  Female who presents with a chief complaint of Persistent cough, hoarseness, chest pain radiating down L arm (15 Apr 2019 07:39)      SUBJECTIVE / OVERNIGHT EVENTS: Pt seen and examined at 11:15am, tele with sinus lola to 43/mt, no overnight events, pt still with some cough with yellowish sputum, no sob or chest pain. no other complaints.    MEDICATIONS  (STANDING):  aspirin  chewable 81 milliGRAM(s) Oral daily  atorvastatin 40 milliGRAM(s) Oral at bedtime  azithromycin  IVPB 500 milliGRAM(s) IV Intermittent every 24 hours  cefTRIAXone   IVPB 1 Gram(s) IV Intermittent every 24 hours  metoprolol tartrate 50 milliGRAM(s) Oral two times a day  senna 2 Tablet(s) Oral at bedtime    MEDICATIONS  (PRN):  benzonatate 100 milliGRAM(s) Oral three times a day PRN Cough  guaiFENesin    Syrup 200 milliGRAM(s) Oral every 6 hours PRN Cough  HYDROcodone/homatropine Syrup 5 milliLiter(s) Oral every 6 hours PRN Cough  levalbuterol Inhalation 0.63 milliGRAM(s) Inhalation every 6 hours PRN wheeze  melatonin 3 milliGRAM(s) Oral at bedtime PRN Insomnia      Vital Signs Last 24 Hrs  T(C): 36.7 (15 Apr 2019 14:58), Max: 36.8 (14 Apr 2019 20:39)  T(F): 98 (15 Apr 2019 14:58), Max: 98.2 (14 Apr 2019 20:39)  HR: 72 (15 Apr 2019 14:58) (55 - 72)  BP: 104/65 (15 Apr 2019 14:58) (104/65 - 147/71)  BP(mean): --  RR: 19 (15 Apr 2019 14:58) (18 - 19)  SpO2: 100% (15 Apr 2019 14:58) (99% - 100%)  CAPILLARY BLOOD GLUCOSE        I&O's Summary      PHYSICAL EXAM:  GENERAL: NAD, well-developed  CHEST/LUNG: Clear to auscultation bilaterally; No wheeze  HEART: Regular rate and rhythm  ABDOMEN: Soft, Nontender, Nondistended  EXTREMITIES: no LE edema  PSYCH: Calm  NEUROLOGY: AAOx3  SKIN: No rashes or lesions    LABS:                        10.8   7.09  )-----------( 278      ( 15 Apr 2019 05:30 )             33.2     04-15    134<L>  |  98  |  13  ----------------------------<  168<H>  4.1   |  23  |  0.63    Ca    9.0      15 Apr 2019 05:30  Mg     2.3     04-15                RADIOLOGY & ADDITIONAL TESTS:    Imaging Personally Reviewed:    Consultant(s) Notes Reviewed:      Care Discussed with Consultants/Other Providers:

## 2019-04-16 NOTE — PROGRESS NOTE ADULT - PROBLEM SELECTOR PLAN 5
- c/w BB, ASA, and statin therapy  - will avoid use of vasodilators/ ACE/ARB  - Cards recs appreciated  - TTE pending for further eval

## 2019-04-16 NOTE — PROGRESS NOTE ADULT - REASON FOR ADMISSION
Persistent cough, hoarseness, chest pain radiating down L arm

## 2019-04-16 NOTE — PROGRESS NOTE ADULT - PROBLEM SELECTOR PROBLEM 5
Left ventricular outflow tract obstruction

## 2019-04-16 NOTE — DISCHARGE NOTE PROVIDER - PROVIDER TOKENS
PROVIDER:[TOKEN:[[id number]]],FREE:[LAST:[Mandiau],FIRST:[Aryel],PHONE:[(   )    -],FAX:[(   )    -],ADDRESS:[PCP]]

## 2019-04-16 NOTE — PROGRESS NOTE ADULT - PROBLEM SELECTOR PLAN 6
- SCDs for DVT ppx, IMRPOVE score 0.6%

## 2019-04-17 RX ORDER — CEFPODOXIME PROXETIL 100 MG
1 TABLET ORAL
Qty: 6 | Refills: 0
Start: 2019-04-17 | End: 2019-04-19

## 2019-04-17 RX ORDER — AZITHROMYCIN 500 MG/1
1 TABLET, FILM COATED ORAL
Qty: 1 | Refills: 0
Start: 2019-04-17 | End: 2019-04-17

## 2019-04-18 ENCOUNTER — APPOINTMENT (OUTPATIENT)
Age: 74
End: 2019-04-18

## 2019-04-18 DIAGNOSIS — E78.5 HYPERLIPIDEMIA, UNSPECIFIED: ICD-10-CM

## 2019-04-18 DIAGNOSIS — I10 ESSENTIAL (PRIMARY) HYPERTENSION: ICD-10-CM

## 2019-04-18 DIAGNOSIS — J18.9 PNEUMONIA, UNSPECIFIED ORGANISM: ICD-10-CM

## 2019-04-18 PROBLEM — Z00.00 ENCOUNTER FOR PREVENTIVE HEALTH EXAMINATION: Status: ACTIVE | Noted: 2019-04-18

## 2019-04-18 RX ORDER — ATORVASTATIN CALCIUM 40 MG/1
40 TABLET, FILM COATED ORAL
Refills: 0 | Status: ACTIVE | COMMUNITY
Start: 2019-04-18

## 2019-04-18 RX ORDER — ASPIRIN 81 MG/1
81 TABLET ORAL DAILY
Refills: 0 | Status: ACTIVE | COMMUNITY
Start: 2019-04-18

## 2019-04-18 RX ORDER — CEFPODOXIME PROXETIL 200 MG/1
200 TABLET, FILM COATED ORAL
Refills: 0 | Status: ACTIVE | COMMUNITY
Start: 2019-04-18

## 2019-04-18 RX ORDER — METOPROLOL TARTRATE 50 MG/1
50 TABLET ORAL
Refills: 0 | Status: ACTIVE | COMMUNITY
Start: 2019-04-18

## 2019-04-18 NOTE — HISTORY OF PRESENT ILLNESS
[Family Member] : family member [FreeTextEntry1] : [facility]/PNA Follow up Home visit.  Pt seen in home with son present. Continues on antibiotics as prescribed. Reviewed s/s complications, fever, chills, change in sputum, worsening SOB. Reviewed f/u appt with PCP. Pt declined assessment- is anxious to go to visit her spouse who was also hospitalized for PNA at [facility]. Reviewed TCM contact card for new/worsening symptoms.  [de-identified] : This is a 73F with history of HTN and LVOT obstruction [facility] of a persistent cough, hoarseness and chest pain\par radiating down her L arm. Said that her symptoms initially started about 2\par weeks ago with a persistent cough with body aches, chills, and URI symptoms.\par She came to [facility] on 4/7 for evaluation of this after it failed to improve over\par a week and was found to have a RML/RLL PNA and a fever of 100.6 with a HT of\par 111. She was given IV levofloxacin 750mg with improvement in her symptoms and\par was discharged home with levofloxacin 750mg PO daily. Said that she has been\par taking this medication since then but the cough and chills/diaphoresis have\par continued. Said that today she lost her voice which concerned her and she\par therefore came to the hospital for re-evaluation. She denies any URI symptoms\par currently associated with her symptoms. Said that she was given some\par medications in the ED, including some inhaled medications, and then started to\par have a severe 10/10, pressure like chest pain with radiation down her L arm.\[facility] Said that she has had pain like this before and states that this pain usually\par comes on when she exerts herself but is usually milder in nature. Chart review\[facility] shows that she was hospitalized at [facility] in September 2018 for similar chest\par pain and her wqork showed her to have LV apical hypokenesis with some coronary\par plaques but none which required intervention. [facility] and has not followed up since. She denies any\par other complaints.\par \par On arrival to the ED, her vitals were T 98.4, P 83, /94, R 18, O2 sat 95%\par RA. Her lab work did not show any acute abnormalities from prior and her RVP\par was negative. She had a CTA Chest that showed difuse bilateral ground glass\par opacities and was negative for a PE. She was given ceftriaxone 1g IVPB,\par azithromycin 500mg IVPB, solu-medrol 125mg IVP, and duonebs x2. She was then\par admitted to medicine. (13 Apr 2019 01:45)\par \par On admission:\par CE NEg X 2\par EKG Sinus Tach 111\par ProBNP 981\par CXR- No main, right, left, lobar pulmonary embolism. Limited evaluation of\par subsegmental pulmonary arteries due to motion artifact. Diffuse bilateral\par patchy groundglass opacities may represent infection versus edema.\par CT Angio of Chest- No main, right, left, lobar pulmonary embolism. Limited\par evaluation of subsegmental pulmonary arteries due to motion artifact. Diffuse\par bilateral patchy groundglass opacities may represent infection versus edema.\par RVP Negative\par 4/15: Echo EF 74% 1. Mitral annular calcification and calcified mitral leaflets\par with normal diastolic opening. Moderate mitral regurgitation. 2. Calcified\par trileaflet aortic valve with normal opening. 3. Normal left ventricular\par internal dimensions and wall thicknesses. 4. Normal left ventricular systolic\par function. No segmental wall motion abnormalities. 5. Normal right ventricular\par size and function. *** Compared with echocardiogram of 9/5/2018, LV function\par has significantly improved.\par \par Evaluated by cardiology: 73 year old woman with HTN, HLD and systolic CHF due\par to Takotsubo cardiomyopathy presents with PNA.\par #Chest pain-Ms. Rodriguez has ruled out for ACS and EKG is sinus tachycardia\par with non-specific ST changes.Chest pain likely secondary to PNA. Continue ASA\par and statin for known non-obstructive CAD as per angiogram in 9/2018.\par #Compensated systolic CHF-Due to non-ischemic stress induced cardiomyopathy in\par 9/2018. Repeat TTE shows resolution of cardiomyopathy. No change in cardiac\par management at this time.\par #HTN-BP acceptable at present.No further inpatient cardiac work up warranted at\par this time.\par \par Evaluated by medicine: 73F with history of HTN, systolic CHF due to Takotsubo\par cardiomyopathy and LVOT obstruction who presents to the hospital with c/o a\par persistent cough likely 2/2 bilateral PNA.\par -Pneumonia of both lungs due to infectious organism, unspecified part of lung:\par - Patient with persistent PNA of bilateral lungs with persistent cough,\par diaphoresis, chills, and subjective fevers - On PO levofloxacin at home with\par inadequate improvement, received CTX/AZT in ED, continued on CTX & azithromycin\par while hospitalized. RVP negative. Discharge home on antibiotics in order to\par complete 7 day course total (vantin 200mg bid x 3 more days & azithromycin x 1\par more day).\par -Hoarseness of voice: - Likely 2/2 persistent coughing as the hoarseness\par started almost 2 weeks after the onset of her symptoms - cont hycodan, c/w\par robitussin, tessalon perles, and xopenex prn for her cough.\par -Chest pain likely 2/2 PNA - Cardiology consult appreciated, ACS ruled out -\par Would monitor patient on telemetry, hstrops negative - Echo as above. Cleared\par by cardiology for discharge home.\par -Essential hypertension: - c/w metoprolol - No ACEi/ARB due to LVOT obstruction\par seen on last echo.\par -Left ventricular outflow tract obstruction: - c/w BB, ASA, and statin therapy\par - will avoid use of vasodilators/ ACE/ARB - Cards recs appreciated - TTE as\par above.\par \par Patient cleared for discharge home with outpatient f/u with PCP & cardiology.

## 2019-05-08 ENCOUNTER — APPOINTMENT (OUTPATIENT)
Dept: OBGYN | Facility: CLINIC | Age: 74
End: 2019-05-08
Payer: MEDICARE

## 2019-05-08 VITALS
HEIGHT: 59 IN | DIASTOLIC BLOOD PRESSURE: 70 MMHG | WEIGHT: 156 LBS | BODY MASS INDEX: 31.45 KG/M2 | SYSTOLIC BLOOD PRESSURE: 160 MMHG

## 2019-05-08 DIAGNOSIS — F41.0 PANIC DISORDER [EPISODIC PAROXYSMAL ANXIETY]: ICD-10-CM

## 2019-05-08 DIAGNOSIS — L72.3 SEBACEOUS CYST: ICD-10-CM

## 2019-05-08 DIAGNOSIS — Z01.411 ENCOUNTER FOR GYNECOLOGICAL EXAMINATION (GENERAL) (ROUTINE) WITH ABNORMAL FINDINGS: ICD-10-CM

## 2019-05-08 PROCEDURE — G0101: CPT

## 2019-05-08 PROCEDURE — 99202 OFFICE O/P NEW SF 15 MIN: CPT | Mod: 25

## 2019-05-08 NOTE — PHYSICAL EXAM
[Awake] : awake [Alert] : alert [Acute Distress] : no acute distress [LAD] : no lymphadenopathy [Goiter] : no goiter [Thyroid Nodule] : no thyroid nodule [Mass] : no breast mass [Nipple Discharge] : no nipple discharge [Axillary LAD] : no axillary lymphadenopathy [Tender] : non tender [Soft] : soft [Oriented x3] : oriented to person, place, and time [Normal] : uterus [Atrophy] : atrophy [No Bleeding] : there was no active vaginal bleeding [Cystocele] : a cystocele [RRR, No Murmurs] : RRR, no murmurs [Uterine Adnexae] : were not tender and not enlarged [de-identified] : sebaceous cyst left  [CTAB] : CTAB

## 2019-05-13 LAB — CYTOLOGY CVX/VAG DOC THIN PREP: NORMAL

## 2019-12-11 ENCOUNTER — TRANSCRIPTION ENCOUNTER (OUTPATIENT)
Age: 74
End: 2019-12-11

## 2019-12-11 ENCOUNTER — INPATIENT (INPATIENT)
Facility: HOSPITAL | Age: 74
LOS: 0 days | Discharge: HOME | DRG: 247 | End: 2019-12-12
Attending: INTERNAL MEDICINE | Admitting: INTERNAL MEDICINE
Payer: MEDICARE

## 2019-12-11 VITALS
HEIGHT: 59 IN | RESPIRATION RATE: 18 BRPM | OXYGEN SATURATION: 96 % | DIASTOLIC BLOOD PRESSURE: 72 MMHG | SYSTOLIC BLOOD PRESSURE: 171 MMHG | HEART RATE: 69 BPM | WEIGHT: 179.9 LBS | TEMPERATURE: 98 F

## 2019-12-11 DIAGNOSIS — R07.89 OTHER CHEST PAIN: ICD-10-CM

## 2019-12-11 DIAGNOSIS — Z98.890 OTHER SPECIFIED POSTPROCEDURAL STATES: Chronic | ICD-10-CM

## 2019-12-11 LAB
ALBUMIN SERPL ELPH-MCNC: 4.2 G/DL — SIGNIFICANT CHANGE UP (ref 3.3–5)
ALP SERPL-CCNC: 84 U/L — SIGNIFICANT CHANGE UP (ref 40–120)
ALT FLD-CCNC: 20 U/L — SIGNIFICANT CHANGE UP (ref 10–45)
ANION GAP SERPL CALC-SCNC: 13 MMOL/L — SIGNIFICANT CHANGE UP (ref 5–17)
AST SERPL-CCNC: 24 U/L — SIGNIFICANT CHANGE UP (ref 10–40)
BILIRUB SERPL-MCNC: 0.2 MG/DL — SIGNIFICANT CHANGE UP (ref 0.2–1.2)
BUN SERPL-MCNC: 16 MG/DL — SIGNIFICANT CHANGE UP (ref 7–23)
CALCIUM SERPL-MCNC: 9.7 MG/DL — SIGNIFICANT CHANGE UP (ref 8.4–10.5)
CHLORIDE SERPL-SCNC: 99 MMOL/L — SIGNIFICANT CHANGE UP (ref 96–108)
CO2 SERPL-SCNC: 24 MMOL/L — SIGNIFICANT CHANGE UP (ref 22–31)
CREAT SERPL-MCNC: 0.72 MG/DL — SIGNIFICANT CHANGE UP (ref 0.5–1.3)
GLUCOSE SERPL-MCNC: 98 MG/DL — SIGNIFICANT CHANGE UP (ref 70–99)
HCT VFR BLD CALC: 37.9 % — SIGNIFICANT CHANGE UP (ref 34.5–45)
HGB BLD-MCNC: 12.8 G/DL — SIGNIFICANT CHANGE UP (ref 11.5–15.5)
MCHC RBC-ENTMCNC: 32.6 PG — SIGNIFICANT CHANGE UP (ref 27–34)
MCHC RBC-ENTMCNC: 33.8 GM/DL — SIGNIFICANT CHANGE UP (ref 32–36)
MCV RBC AUTO: 96.4 FL — SIGNIFICANT CHANGE UP (ref 80–100)
NRBC # BLD: 0 /100 WBCS — SIGNIFICANT CHANGE UP (ref 0–0)
PLATELET # BLD AUTO: 156 K/UL — SIGNIFICANT CHANGE UP (ref 150–400)
POTASSIUM SERPL-MCNC: 3.9 MMOL/L — SIGNIFICANT CHANGE UP (ref 3.5–5.3)
POTASSIUM SERPL-SCNC: 3.9 MMOL/L — SIGNIFICANT CHANGE UP (ref 3.5–5.3)
PROT SERPL-MCNC: 7.9 G/DL — SIGNIFICANT CHANGE UP (ref 6–8.3)
RBC # BLD: 3.93 M/UL — SIGNIFICANT CHANGE UP (ref 3.8–5.2)
RBC # FLD: 13.2 % — SIGNIFICANT CHANGE UP (ref 10.3–14.5)
SODIUM SERPL-SCNC: 136 MMOL/L — SIGNIFICANT CHANGE UP (ref 135–145)
WBC # BLD: 7.81 K/UL — SIGNIFICANT CHANGE UP (ref 3.8–10.5)
WBC # FLD AUTO: 7.81 K/UL — SIGNIFICANT CHANGE UP (ref 3.8–10.5)

## 2019-12-11 PROCEDURE — 93571 IV DOP VEL&/PRESS C FLO 1ST: CPT | Mod: 26,RC

## 2019-12-11 PROCEDURE — 93458 L HRT ARTERY/VENTRICLE ANGIO: CPT | Mod: 26,59

## 2019-12-11 PROCEDURE — 93010 ELECTROCARDIOGRAM REPORT: CPT

## 2019-12-11 PROCEDURE — 92928 PRQ TCAT PLMT NTRAC ST 1 LES: CPT | Mod: RC

## 2019-12-11 PROCEDURE — 99152 MOD SED SAME PHYS/QHP 5/>YRS: CPT

## 2019-12-11 RX ORDER — ASPIRIN/CALCIUM CARB/MAGNESIUM 324 MG
1 TABLET ORAL
Qty: 0 | Refills: 0 | DISCHARGE
Start: 2019-12-11

## 2019-12-11 RX ORDER — ATORVASTATIN CALCIUM 80 MG/1
40 TABLET, FILM COATED ORAL AT BEDTIME
Refills: 0 | Status: DISCONTINUED | OUTPATIENT
Start: 2019-12-11 | End: 2019-12-12

## 2019-12-11 RX ORDER — CLOPIDOGREL BISULFATE 75 MG/1
75 TABLET, FILM COATED ORAL DAILY
Refills: 0 | Status: DISCONTINUED | OUTPATIENT
Start: 2019-12-12 | End: 2019-12-12

## 2019-12-11 RX ORDER — ASPIRIN/CALCIUM CARB/MAGNESIUM 324 MG
81 TABLET ORAL DAILY
Refills: 0 | Status: DISCONTINUED | OUTPATIENT
Start: 2019-12-12 | End: 2019-12-12

## 2019-12-11 RX ORDER — ACETAMINOPHEN 500 MG
650 TABLET ORAL ONCE
Refills: 0 | Status: COMPLETED | OUTPATIENT
Start: 2019-12-11 | End: 2019-12-11

## 2019-12-11 RX ORDER — CLOPIDOGREL BISULFATE 75 MG/1
1 TABLET, FILM COATED ORAL
Qty: 90 | Refills: 3
Start: 2019-12-11 | End: 2020-12-04

## 2019-12-11 RX ORDER — METOPROLOL TARTRATE 50 MG
50 TABLET ORAL DAILY
Refills: 0 | Status: DISCONTINUED | OUTPATIENT
Start: 2019-12-11 | End: 2019-12-12

## 2019-12-11 RX ORDER — GABAPENTIN 400 MG/1
100 CAPSULE ORAL DAILY
Refills: 0 | Status: DISCONTINUED | OUTPATIENT
Start: 2019-12-11 | End: 2019-12-12

## 2019-12-11 RX ORDER — LANOLIN ALCOHOL/MO/W.PET/CERES
5 CREAM (GRAM) TOPICAL AT BEDTIME
Refills: 0 | Status: DISCONTINUED | OUTPATIENT
Start: 2019-12-11 | End: 2019-12-12

## 2019-12-11 RX ORDER — FOLIC ACID 0.8 MG
1 TABLET ORAL DAILY
Refills: 0 | Status: DISCONTINUED | OUTPATIENT
Start: 2019-12-11 | End: 2019-12-12

## 2019-12-11 RX ADMIN — Medication 650 MILLIGRAM(S): at 20:46

## 2019-12-11 RX ADMIN — Medication 1 MILLIGRAM(S): at 17:41

## 2019-12-11 RX ADMIN — Medication 5 MILLIGRAM(S): at 21:21

## 2019-12-11 RX ADMIN — ATORVASTATIN CALCIUM 40 MILLIGRAM(S): 80 TABLET, FILM COATED ORAL at 21:21

## 2019-12-11 RX ADMIN — GABAPENTIN 100 MILLIGRAM(S): 400 CAPSULE ORAL at 17:41

## 2019-12-11 RX ADMIN — Medication 50 MILLIGRAM(S): at 17:41

## 2019-12-11 RX ADMIN — Medication 650 MILLIGRAM(S): at 19:57

## 2019-12-11 NOTE — DISCHARGE NOTE PROVIDER - HOSPITAL COURSE
74 yr old Gabonese female with PMHx of HTN and LVOT obstruction, Diagnostic Cath in 9/2018 showed pLcx 40% and mid RCA-50% presents with chest pain on exertion. Pt reports chest pain radiating to left arm on exertion which resolves with rest. moderate in intensity, sharp in nature.  Pt was referred for Cardiac Cath by Dr Harry Baird.  Patient is now s/p PCI SALAZAR x 1 mRCA 70% (IFR 0.75) via RRA access on 12/11/2019.  She tolerated the procedure well.  Post PCI EKG without acute changes.  RFA site benign without presence of bleeding/hematoma, radial pulse palpable, patient without complaints.  Patient remains hemodynamically stable and her hospital course was otherwise uneventful.  Patient is now medically stable for discharge home today. 74 yr old Gambian female with PMHx of HTN and LVOT obstruction, Diagnostic Cath in 9/2018 showed pLcx 40% and mid RCA-50% presents with chest pain on exertion. Pt reports chest pain radiating to left arm on exertion which resolves with rest. moderate in intensity, sharp in nature.  Pt was referred for Cardiac Cath by Dr Harry Baird.  Patient is now s/p PCI SALAZAR x 1 mRCA 70% (IFR 0.75) via RRA access on 12/11/2019.  She tolerated the procedure well.  Post PCI EKG without acute changes.  RFA site benign without presence of bleeding/hematoma, radial pulse palpable, patient without complaints.  Patient remains hemodynamically stable and her hospital course was otherwise uneventful.  Patient is now medically stable for discharge home today.               12/11/2019  s/p PCI SALAZAR x 1 mRCA 70% (IFR 0.75) via RRA access on ASA and Plavix 74 yr old Salvadorean female with PMHx of HTN and LVOT obstruction, Diagnostic Cath in 9/2018 showed pLcx 40% and mid RCA-50% presents with chest pain on exertion. Pt reports chest pain radiating to left arm on exertion which resolves with rest. moderate in intensity, sharp in nature.  Pt was referred for Cardiac Cath by Dr Harry Baird.  Patient is now s/p PCI SALAZAR x 1 mRCA 70% (IFR 0.75) via RRA access on 12/11/2019.  She tolerated the procedure well.  Post PCI EKG without acute changes.  RFA site benign without presence of bleeding/hematoma, radial pulse palpable, patient without complaints.  Patient remains hemodynamically stable and her hospital course was otherwise uneventful.  Patient is now medically stable for discharge home today.

## 2019-12-11 NOTE — DISCHARGE NOTE PROVIDER - NSDCCPTREATMENT_GEN_ALL_CORE_FT
PRINCIPAL PROCEDURE  Procedure: Percutaneous coronary intervention, with stent insertion  Findings and Treatment: mRCA 70%, IFR 0.75 - SALAZAR x 1, LM normal, LAD normal, pLCx 40-50%

## 2019-12-11 NOTE — DISCHARGE NOTE PROVIDER - NSDCCPCAREPLAN_GEN_ALL_CORE_FT
PRINCIPAL DISCHARGE DIAGNOSIS  Diagnosis: Coronary artery disease  Assessment and Plan of Treatment: Low salt, low fat diet.   Weight management.   Take medications as prescribed.    No smoking.  Follow up appointments with your doctor(s)  as instruced.   No heavy lifting for 2 weeks, no strenuous activity  ( pushing/ pulling) no driving for x 2 days,  you may shower 24 hours following procedure but no bathing or swimming for x1  week, no strenuous sex for x 1 week & follow up with your cardiologist in 1-2 week      SECONDARY DISCHARGE DIAGNOSES  Diagnosis: HLD (hyperlipidemia)  Assessment and Plan of Treatment: Continue with your cholesterol medications. Eat a heart healthy diet that is low in saturated fats and salt, and includes whole grains, fruits, vegetables and lean protein; exercise regularly (consult with your physician or cardiologist first); maintain a heart healthy weight; if you smoke - quit (A resource to help you stop smoking is the [facility] [employer] for Tobacco Control – phone number [phone].). Continue to follow with your primary physician or cardiologist.    Diagnosis: Hypertension  Assessment and Plan of Treatment: Continue with your blood pressure medications; eat a heart healthy diet with low salt diet; exercise regularly (consult with your physician or cardiologist first); maintain a heart healthy weight; if you smoke - quit (A resource to help you stop smoking is the [facility] [employer] for [employer] Control – phone number [phone].); include healthy ways to manage stress. Continue to follow with your primary care physician or cardiologist.

## 2019-12-11 NOTE — H&P CARDIOLOGY - HISTORY OF PRESENT ILLNESS
74 yr old Hong Konger female with PMHx of HTN and LVOT obstruction, Diagnostic Cath in 9/2018 showed pLcx 40% and mid RCA-50% presents with chest pain on exertion. Pt reports chest pain radiating to left arm on exertion which resolves with rest. moderate in intensity, sharp in nature.  Pt was referred for Cardiac Cath by Dr Sammy Mcdonald.      < from: Cardiac Cath Lab - Adult (09.04.18 @ 09:31) >  VENTRICLES: Analysis of regional contractile function demonstrated apical akinesis. Global left ventricular function was moderately depressed. EF estimated was 40 %.  VALVES: MITRAL VALVE: The mitral valve was evaluated by left ventriculography.  The mitral valve exhibited mild regurgitation.  CORONARY VESSELS: The coronary circulation is right dominant.  LM:   --  LM: Normal.  LAD:   --  LAD: Normal.  CX:   --  Ostial circumflex: There was a discrete 40 % stenosis.  RCA:   --  Mid RCA: There was a tubular 30 % stenosis in the proximal third of the vessel segment. In a second lesion, there was a discrete 50 % stenosis. There was RUPESH grade 3 flow through the vessel (brisk flow).  DIAGNOSTIC RECOMMENDATIONS: Continue aspirin, 81 mg, PO, daily. Medical management is recommended. Consider stress-induced cardiomyopathy.

## 2019-12-11 NOTE — DISCHARGE NOTE PROVIDER - CARE PROVIDER_API CALL
Harry Christianson (DO)  Cardiovascular Disease; Internal Medicine; Nuclear Cardiology  [address] [place] [address], [place] [address]  Phone: [phone]  Fax: [phone]  Follow Up Time: 2 weeks

## 2019-12-11 NOTE — DISCHARGE NOTE PROVIDER - NSDCMRMEDTOKEN_GEN_ALL_CORE_FT
aspirin 81 mg oral delayed release tablet: 1 tab(s) orally once a day  clopidogrel 75 mg oral tablet: 1 tab(s) orally once a day MDD:1  folic acid 1 mg oral tablet: 1 tab(s) orally once a day  gabapentin 100 mg oral tablet: orally once a day  Lipitor 40 mg oral tablet: 1 tab(s) orally once a day (at bedtime)  meloxicam 7.5 mg oral tablet: 1 tab(s) orally once a day  Metoprolol Succinate ER 50 mg oral tablet, extended release: 1 tab(s) orally once a day  Vitamin B12 100 mcg oral tablet: 1 tab(s) orally once a day  Vitamin B6 100 mg oral tablet: 1 tab(s) orally once a day

## 2019-12-12 ENCOUNTER — TRANSCRIPTION ENCOUNTER (OUTPATIENT)
Age: 74
End: 2019-12-12

## 2019-12-12 VITALS
OXYGEN SATURATION: 97 % | HEART RATE: 50 BPM | SYSTOLIC BLOOD PRESSURE: 148 MMHG | DIASTOLIC BLOOD PRESSURE: 67 MMHG | RESPIRATION RATE: 17 BRPM | TEMPERATURE: 98 F

## 2019-12-12 DIAGNOSIS — E78.5 HYPERLIPIDEMIA, UNSPECIFIED: ICD-10-CM

## 2019-12-12 DIAGNOSIS — I25.10 ATHEROSCLEROTIC HEART DISEASE OF NATIVE CORONARY ARTERY WITHOUT ANGINA PECTORIS: ICD-10-CM

## 2019-12-12 DIAGNOSIS — I10 ESSENTIAL (PRIMARY) HYPERTENSION: ICD-10-CM

## 2019-12-12 PROCEDURE — 99153 MOD SED SAME PHYS/QHP EA: CPT

## 2019-12-12 PROCEDURE — C1887: CPT

## 2019-12-12 PROCEDURE — 99238 HOSP IP/OBS DSCHRG MGMT 30/<: CPT

## 2019-12-12 PROCEDURE — 93571 IV DOP VEL&/PRESS C FLO 1ST: CPT | Mod: RC

## 2019-12-12 PROCEDURE — 80053 COMPREHEN METABOLIC PANEL: CPT

## 2019-12-12 PROCEDURE — 93458 L HRT ARTERY/VENTRICLE ANGIO: CPT | Mod: 59

## 2019-12-12 PROCEDURE — C9600: CPT | Mod: RC

## 2019-12-12 PROCEDURE — 99152 MOD SED SAME PHYS/QHP 5/>YRS: CPT

## 2019-12-12 PROCEDURE — 93005 ELECTROCARDIOGRAM TRACING: CPT

## 2019-12-12 PROCEDURE — C1894: CPT

## 2019-12-12 PROCEDURE — C1874: CPT

## 2019-12-12 PROCEDURE — C1769: CPT

## 2019-12-12 PROCEDURE — 85027 COMPLETE CBC AUTOMATED: CPT

## 2019-12-12 RX ADMIN — Medication 50 MILLIGRAM(S): at 05:27

## 2019-12-12 RX ADMIN — CLOPIDOGREL BISULFATE 75 MILLIGRAM(S): 75 TABLET, FILM COATED ORAL at 05:27

## 2019-12-12 RX ADMIN — Medication 81 MILLIGRAM(S): at 05:27

## 2019-12-12 RX ADMIN — GABAPENTIN 100 MILLIGRAM(S): 400 CAPSULE ORAL at 12:08

## 2019-12-12 RX ADMIN — Medication 1 MILLIGRAM(S): at 12:08

## 2019-12-12 NOTE — CONSULT NOTE ADULT - SUBJECTIVE AND OBJECTIVE BOX
CHIEF COMPLAINT:Patient is a 74y old  Female who presents with a chief complaint of Chest pain (11 Dec 2019 18:41)      HISTORY OF PRESENT ILLNESS:HPI:  74 yr old Hungarian female with PMHx of HTN and LVOT obstruction, Diagnostic Cath in 9/2018 showed pLcx 40% and mid RCA-50% presents with chest pain on exertion. Pt reports chest pain radiating to left arm on exertion which resolves with rest. moderate in intensity, sharp in nature.  Pt was referred for Cardiac Cath by Dr Sammy Mcdonald.      < from: Cardiac Cath Lab - Adult (09.04.18 @ 09:31) >  VENTRICLES: Analysis of regional contractile function demonstrated apical akinesis. Global left ventricular function was moderately depressed. EF estimated was 40 %.  VALVES: MITRAL VALVE: The mitral valve was evaluated by left ventriculography.  The mitral valve exhibited mild regurgitation.  CORONARY VESSELS: The coronary circulation is right dominant.  LM:   --  LM: Normal.  LAD:   --  LAD: Normal.  CX:   --  Ostial circumflex: There was a discrete 40 % stenosis.  RCA:   --  Mid RCA: There was a tubular 30 % stenosis in the proximal third of the vessel segment. In a second lesion, there was a discrete 50 % stenosis. There was RUPESH grade 3 flow through the vessel (brisk flow).  DIAGNOSTIC RECOMMENDATIONS: Continue aspirin, 81 mg, PO, daily. Medical management is recommended. Consider stress-induced cardiomyopathy. (11 Dec 2019 14:15)      PAST MEDICAL & SURGICAL HISTORY:  Left ventricular outflow tract obstruction  Spinal stenosis  HTN (hypertension)  History of back surgery  Status post LASIK surgery of both eyes          MEDICATIONS:  metoprolol succinate ER 50 milliGRAM(s) Oral daily        gabapentin 100 milliGRAM(s) Oral daily  melatonin 5 milliGRAM(s) Oral at bedtime      atorvastatin 40 milliGRAM(s) Oral at bedtime    folic acid 1 milliGRAM(s) Oral daily      FAMILY HISTORY:  Family history of coronary artery disease in brother: Brother passed away from CAD at age 60      Non-contributory    SOCIAL HISTORY:    not a current smoker    Allergies    sulfa drugs (Swelling)    Intolerances    	    REVIEW OF SYSTEMS: NOW  CONSTITUTIONAL: No fever  EYES: No eye pain, visual disturbances, or discharge  ENMT:  No difficulty hearing, tinnitus  NECK: No pain or stiffness  RESPIRATORY: No cough, wheezing,  CARDIOVASCULAR: No chest pain, palpitations, passing out, dizziness, or leg swelling  GASTROINTESTINAL:  No nausea, vomiting, diarrhea or constipation. No melena.  GENITOURINARY: No dysuria, hematuria  NEUROLOGICAL: + tingling in the legs  SKIN: No burning or lesions   ENDOCRINE: No heat or cold intolerance  MUSCULOSKELETAL: No joint pain or swelling  PSYCHIATRIC: No  anxiety, mood swings  HEME/LYMPH: No bleeding gums  ALLERGY AND IMMUNOLOGIC: No hives or eczema	    All other ROS negative    PHYSICAL EXAM:  T(C): 36.7 (12-11-19 @ 20:22), Max: 36.8 (12-11-19 @ 14:15)  HR: 54 (12-11-19 @ 21:50) (54 - 82)  BP: 148/63 (12-11-19 @ 21:50) (125/89 - 179/79)  RR: 18 (12-11-19 @ 21:50) (18 - 18)  SpO2: 96% (12-11-19 @ 21:50) (95% - 100%)  Wt(kg): --  I&O's Summary    11 Dec 2019 07:01  -  11 Dec 2019 22:30  --------------------------------------------------------  IN: 480 mL / OUT: 0 mL / NET: 480 mL        Appearance: Normal	  HEENT:   Normal oral mucosa, EOMI	  Cardiovascular: Normal S1 S2, No JVD, No murmurs  Respiratory: Lungs clear to auscultation	  Psychiatry: Alert  Gastrointestinal:  Soft, Non-tender, + BS	  Skin: No rashes   Neurologic: Non-focal  Extremities:  No edema  Vascular: Peripheral pulses palpable    	    	  	  CARDIAC MARKERS:  Labs personally reviewed by me                                  12.8   7.81  )-----------( 156      ( 11 Dec 2019 14:55 )             37.9     12-11    136  |  99  |  16  ----------------------------<  98  3.9   |  24  |  0.72    Ca    9.7      11 Dec 2019 14:55    TPro  7.9  /  Alb  4.2  /  TBili  0.2  /  DBili  x   /  AST  24  /  ALT  20  /  AlkPhos  84  12-11        Assessment /Plan:   < from: Cardiac Cath Lab - Adult (12.11.19 @ 15:18) >  LM:   --  LM: Angiography showed minor luminal irregularities with no flow  limiting lesions.  LAD:   --  LAD: Angiography showed minor luminal irregularities with no  flow limiting lesions.  CX:   --  Circumflex: Angiography showed minor luminal irregularities with  no flow limiting lesions.  RCA:   --  Proximal RCA: There was a 70 % stenosis.    < end of copied text >      74 yr old Hungarian female with PMHx of HTN and LVOT obstruction, Diagnostic Cath in 9/2018 showed pLcx 40% and mid RCA-50% presents with chest pain on exertion. Pt reports chest pain radiating to left arm on exertion which resolves with rest. moderate in intensity, sharp in nature.    1. CAD s/p prox RCA stent - DAPT with ASA and Plavix, Lipitor  2. HTN - c.w statin    outpt follow up with me in 2 weeks    Harry Christianson DO [facility]  Cardiovascular Medicine  [phone]
Patient seen and examined at bedside  Case discussed with medical team    HPI:  74 yr old Lao female with PMHx of HTN and LVOT obstruction, Diagnostic Cath in 9/2018 showed pLcx 40% and mid RCA-50% presents with chest pain on exertion. Pt reports chest pain radiating to left arm on exertion which resolves with rest. moderate in intensity, sharp in nature.  Pt was referred for Cardiac Cath by Dr Sammy Mcdonald.      < from: Cardiac Cath Lab - Adult (09.04.18 @ 09:31) >  VENTRICLES: Analysis of regional contractile function demonstrated apical akinesis. Global left ventricular function was moderately depressed. EF estimated was 40 %.  VALVES: MITRAL VALVE: The mitral valve was evaluated by left ventriculography.  The mitral valve exhibited mild regurgitation.  CORONARY VESSELS: The coronary circulation is right dominant.  LM:   --  LM: Normal.  LAD:   --  LAD: Normal.  CX:   --  Ostial circumflex: There was a discrete 40 % stenosis.  RCA:   --  Mid RCA: There was a tubular 30 % stenosis in the proximal third of the vessel segment. In a second lesion, there was a discrete 50 % stenosis. There was RUPESH grade 3 flow through the vessel (brisk flow).  DIAGNOSTIC RECOMMENDATIONS: Continue aspirin, 81 mg, PO, daily. Medical management is recommended. Consider stress-induced cardiomyopathy. (11 Dec 2019 14:15)      PAST MEDICAL & SURGICAL HISTORY:  Left ventricular outflow tract obstruction  Spinal stenosis  HTN (hypertension)  History of back surgery  Status post LASIK surgery of both eyes      sulfa drugs (Swelling)       MEDICATIONS  (STANDING):  aspirin enteric coated 81 milliGRAM(s) Oral daily  atorvastatin 40 milliGRAM(s) Oral at bedtime  clopidogrel Tablet 75 milliGRAM(s) Oral daily  folic acid 1 milliGRAM(s) Oral daily  gabapentin 100 milliGRAM(s) Oral daily  melatonin 5 milliGRAM(s) Oral at bedtime  metoprolol succinate ER 50 milliGRAM(s) Oral daily    MEDICATIONS  (PRN):      REVIEW OF SYSTEMS:  CONSTITUTIONAL: (+) malaise.   EYES: No acute change in vision   ENT:  No tinnitus  NECK: No stiffness  RESPIRATORY: No hemoptysis  CARDIOVASCULAR: No active chest pain, palpitations, syncope  GASTROINTESTINAL: No hematemesis, diarrhea, melena, or hematochezia.  GENITOURINARY: No hematuria  NEUROLOGICAL: No headaches  LYMPH Nodes: No enlarged glands  ENDOCRINE: No heat or cold intolerance	    T(C): 36.8 (12-12-19 @ 11:15), Max: 36.9 (12-12-19 @ 04:53)  HR: 50 (12-12-19 @ 11:15) (50 - 82)  BP: 148/67 (12-12-19 @ 11:15) (111/59 - 179/79)  RR: 17 (12-12-19 @ 11:15) (17 - 18)  SpO2: 97% (12-12-19 @ 11:15) (95% - 100%)    PHYSICAL EXAMINATION:   Constitutional: WD, NAD  HEENT: NC, AT  Neck:  Supple  Respiratory:  Adequate airflow b/l. Not using accessory muscles of respiration.  Cardiovascular:  S1 & S2 intact, no R/G, 2+ radial pulses b/l  Gastrointestinal: Soft, NT, ND, normoactive b.s., no organomegaly/RT/rigidity  Extremities: WWP  Neurological:  Alert and awake.  No acute focal motor deficits. Crude sensation intact.     Labs and imaging reviewed    LABS:                        12.8   7.81  )-----------( 156      ( 11 Dec 2019 14:55 )             37.9     12-11    136  |  99  |  16  ----------------------------<  98  3.9   |  24  |  0.72    Ca    9.7      11 Dec 2019 14:55    TPro  7.9  /  Alb  4.2  /  TBili  0.2  /  DBili  x   /  AST  24  /  ALT  20  /  AlkPhos  84  12-11            CAPILLARY BLOOD GLUCOSE            LIVER FUNCTIONS - ( 11 Dec 2019 14:55 )  Alb: 4.2 g/dL / Pro: 7.9 g/dL / ALK PHOS: 84 U/L / ALT: 20 U/L / AST: 24 U/L / GGT: x               RADIOLOGY & ADDITIONAL STUDIES:

## 2019-12-12 NOTE — PROGRESS NOTE ADULT - PROBLEM SELECTOR PLAN 2
Your blood pressure will be controlled.  Continue with your blood pressure medications; eat a heart healthy diet with low salt diet; exercise regularly (consult with your physician or cardiologist first); maintain a heart healthy weight; if you smoke - quit (A resource to help you stop smoking is the [facility] for Tobacco Control – phone number [phone].); include healthy ways to manage stress. Continue to follow with your primary care physician or cardiologist.

## 2019-12-12 NOTE — CONSULT NOTE ADULT - ASSESSMENT
74y old  Female who presents with a chief complaint of Chest pain    -> Angina Pectoris with CAD:     - s/p C SALAZAR x mRCA via RRA access     - tolerated the procedure well, no complications noted     - c/w ASA, Plavix, Statin, and BB     - lifestyle modifications     - d/c planning. All team members pt care and management appreciated   -> Essential HTN:      - c/w antihypertensive rx   -> Neuropathy:      - gabapentin  -> HLD:      - Statin   -> Advance Care Planning:      - Full code

## 2019-12-12 NOTE — PROGRESS NOTE ADULT - ASSESSMENT
Patient is a 74y old  Female who presents with a chief complaint of Chest pain (11 Dec 2019 18:41) now s/p [facility] SALAZAR x mRCA via RRA access. Pt tolerated the procedure well; cardiac cath site benign. Post-procedure discharge instructions discussed and questions addressed

## 2019-12-12 NOTE — PROGRESS NOTE ADULT - PROBLEM SELECTOR PLAN 1
Pt remains chest pain free and understands post cath discharge instructions   No heavy lifting or pushing/pulling with procedure arm for 2 weeks. No driving for 2 days. You may shower 24 hours following the procedure but avoid baths/swimming for 1 week. Check your wrist site for bleeding and/or swelling daily following procedure and call your doctor immediately if it occurs or if you experience increased pain at the site. Follow up with your cardiologist in 1-2 weeks. You may call [facility] if you have any questions/concerns regarding your procedure [phone].

## 2019-12-12 NOTE — DISCHARGE NOTE NURSING/CASE MANAGEMENT/SOCIAL WORK - PATIENT PORTAL LINK FT
You can access the FollowMyHealth Patient Portal offered by [facility] by registering at the following website: [email or web address]. By joining [facility]’s FollowMyHealth portal, you will also be able to view your health information using other applications (apps) compatible with our system.

## 2019-12-12 NOTE — PROGRESS NOTE ADULT - SUBJECTIVE AND OBJECTIVE BOX
Patient is a 74y old  Female who presents with a chief complaint of Chest pain (11 Dec 2019 18:41) now s/p [facility] SALAZAR x mRCA via RRA access           Allergies    sulfa drugs (Swelling)    Intolerances        Medications:  aspirin enteric coated 81 milliGRAM(s) Oral daily  atorvastatin 40 milliGRAM(s) Oral at bedtime  clopidogrel Tablet 75 milliGRAM(s) Oral daily  folic acid 1 milliGRAM(s) Oral daily  gabapentin 100 milliGRAM(s) Oral daily  melatonin 5 milliGRAM(s) Oral at bedtime  metoprolol succinate ER 50 milliGRAM(s) Oral daily      Vitals:  T(C): 36.7 (19 @ 20:22), Max: 36.8 (19 @ 14:15)  HR: 54 (19 @ 21:50) (54 - 82)  BP: 148/63 (19 @ 21:50) (125/89 - 179/79)  BP(mean): 105 (19 @ 14:15) (105 - 105)  RR: 18 (19 @ 21:50) (18 - 18)  SpO2: 96% (19 @ 21:50) (95% - 100%)  Wt(kg): --  Daily Height in cm: 149.86 (11 Dec 2019 14:15)    Daily Weight in k.6 (11 Dec 2019 14:15)  I&O's Summary    11 Dec 2019 07:01  -  12 Dec 2019 01:02  --------------------------------------------------------  IN: 480 mL / OUT: 200 mL / NET: 280 mL          Physical Exam:  Appearance: Normal  Eyes: PERRL, EOMI  HENT: Normal oral mus[place]a, NC/AT  Procedural Access Site: RRA access. No hematoma, Non-tender to palpation, 2+ pulse, No bruit, No Ecchymosis  Respiratory: Clear to auscultation bilaterally  Gastrointestinal: Soft, Non tender, Normal Bowel Sounds  Musculoskeletal: No clubbing, No joint deformity   Neurologic: Non-focal  Psychiatry: AAOx3, Mood & affect appropriate  Skin: No rashes, No ecchymoses, No cyanosis        136  |  99  |  16  ----------------------------<  98  3.9   |  24  |  0.72    Ca    9.7      11 Dec 2019 14:55    TPro  7.9  /  Alb  4.2  /  TBili  0.2  /  DBili  x   /  AST  24  /  ALT  20  /  AlkPhos  84  12-      Interpretation of Telemetry:

## 2019-12-12 NOTE — PROGRESS NOTE ADULT - PROBLEM SELECTOR PLAN 3
Your LDL cholesterol will be less than 70mg/dL   Continue with your cholesterol medications. Eat a heart healthy diet that is low in saturated fats and salt, and includes whole grains, fruits, vegetables and lean protein; exercise regularly (consult with your physician or cardiologist first); maintain a heart healthy weight. Continue to follow with your primary physician or cardiologist for treatment goals, continue medication, have liver function testing every 3 months as anti lipid medications can cause liver irritation. If you smoke - quit (A resource to help you stop smoking is the [facility] for Tobacco Control – phone number [phone].).

## 2019-12-20 ENCOUNTER — INPATIENT (INPATIENT)
Facility: HOSPITAL | Age: 74
LOS: 10 days | Discharge: HOME | End: 2019-12-31
Attending: INTERNAL MEDICINE | Admitting: INTERNAL MEDICINE
Payer: MEDICARE

## 2019-12-20 VITALS
DIASTOLIC BLOOD PRESSURE: 69 MMHG | SYSTOLIC BLOOD PRESSURE: 129 MMHG | HEART RATE: 60 BPM | TEMPERATURE: 98 F | OXYGEN SATURATION: 99 % | RESPIRATION RATE: 16 BRPM

## 2019-12-20 DIAGNOSIS — Z98.890 OTHER SPECIFIED POSTPROCEDURAL STATES: Chronic | ICD-10-CM

## 2019-12-20 DIAGNOSIS — Z29.9 ENCOUNTER FOR PROPHYLACTIC MEASURES, UNSPECIFIED: ICD-10-CM

## 2019-12-20 DIAGNOSIS — E87.1 HYPO-OSMOLALITY AND HYPONATREMIA: ICD-10-CM

## 2019-12-20 DIAGNOSIS — Q24.8 OTHER SPECIFIED CONGENITAL MALFORMATIONS OF HEART: ICD-10-CM

## 2019-12-20 DIAGNOSIS — M48.00 SPINAL STENOSIS, SITE UNSPECIFIED: ICD-10-CM

## 2019-12-20 DIAGNOSIS — R07.9 CHEST PAIN, UNSPECIFIED: ICD-10-CM

## 2019-12-20 DIAGNOSIS — E78.5 HYPERLIPIDEMIA, UNSPECIFIED: ICD-10-CM

## 2019-12-20 DIAGNOSIS — I10 ESSENTIAL (PRIMARY) HYPERTENSION: ICD-10-CM

## 2019-12-20 LAB
ALBUMIN SERPL ELPH-MCNC: 4.2 G/DL — SIGNIFICANT CHANGE UP (ref 3.3–5)
ALP SERPL-CCNC: 92 U/L — SIGNIFICANT CHANGE UP (ref 40–120)
ALT FLD-CCNC: 26 U/L — SIGNIFICANT CHANGE UP (ref 4–33)
ANION GAP SERPL CALC-SCNC: 14 MMO/L — SIGNIFICANT CHANGE UP (ref 7–14)
ANION GAP SERPL CALC-SCNC: 14 MMO/L — SIGNIFICANT CHANGE UP (ref 7–14)
APTT BLD: 33.2 SEC — SIGNIFICANT CHANGE UP (ref 27.5–36.3)
AST SERPL-CCNC: 31 U/L — SIGNIFICANT CHANGE UP (ref 4–32)
BASOPHILS # BLD AUTO: 0.04 K/UL — SIGNIFICANT CHANGE UP (ref 0–0.2)
BASOPHILS NFR BLD AUTO: 0.4 % — SIGNIFICANT CHANGE UP (ref 0–2)
BILIRUB SERPL-MCNC: 0.3 MG/DL — SIGNIFICANT CHANGE UP (ref 0.2–1.2)
BUN SERPL-MCNC: 16 MG/DL — SIGNIFICANT CHANGE UP (ref 7–23)
BUN SERPL-MCNC: 16 MG/DL — SIGNIFICANT CHANGE UP (ref 7–23)
CALCIUM SERPL-MCNC: 9.6 MG/DL — SIGNIFICANT CHANGE UP (ref 8.4–10.5)
CALCIUM SERPL-MCNC: 9.6 MG/DL — SIGNIFICANT CHANGE UP (ref 8.4–10.5)
CHLORIDE SERPL-SCNC: 94 MMOL/L — LOW (ref 98–107)
CHLORIDE SERPL-SCNC: 94 MMOL/L — LOW (ref 98–107)
CK MB BLD-MCNC: 10.7 NG/ML — HIGH (ref 1–4.7)
CK MB BLD-MCNC: 2.2 — SIGNIFICANT CHANGE UP (ref 0–2.5)
CK MB BLD-MCNC: 2.2 — SIGNIFICANT CHANGE UP (ref 0–2.5)
CK MB BLD-MCNC: 8.79 NG/ML — HIGH (ref 1–4.7)
CK SERPL-CCNC: 408 U/L — HIGH (ref 25–170)
CK SERPL-CCNC: 491 U/L — HIGH (ref 25–170)
CO2 SERPL-SCNC: 24 MMOL/L — SIGNIFICANT CHANGE UP (ref 22–31)
CO2 SERPL-SCNC: 24 MMOL/L — SIGNIFICANT CHANGE UP (ref 22–31)
CREAT SERPL-MCNC: 0.7 MG/DL — SIGNIFICANT CHANGE UP (ref 0.5–1.3)
CREAT SERPL-MCNC: 0.7 MG/DL — SIGNIFICANT CHANGE UP (ref 0.5–1.3)
EOSINOPHIL # BLD AUTO: 0.21 K/UL — SIGNIFICANT CHANGE UP (ref 0–0.5)
EOSINOPHIL NFR BLD AUTO: 2.3 % — SIGNIFICANT CHANGE UP (ref 0–6)
GLUCOSE SERPL-MCNC: 134 MG/DL — HIGH (ref 70–99)
GLUCOSE SERPL-MCNC: 134 MG/DL — HIGH (ref 70–99)
HCT VFR BLD CALC: 37.4 % — SIGNIFICANT CHANGE UP (ref 34.5–45)
HGB BLD-MCNC: 12.7 G/DL — SIGNIFICANT CHANGE UP (ref 11.5–15.5)
IMM GRANULOCYTES NFR BLD AUTO: 0.2 % — SIGNIFICANT CHANGE UP (ref 0–1.5)
INR BLD: 1.08 — SIGNIFICANT CHANGE UP (ref 0.88–1.17)
LYMPHOCYTES # BLD AUTO: 2.82 K/UL — SIGNIFICANT CHANGE UP (ref 1–3.3)
LYMPHOCYTES # BLD AUTO: 31.2 % — SIGNIFICANT CHANGE UP (ref 13–44)
MCHC RBC-ENTMCNC: 32.4 PG — SIGNIFICANT CHANGE UP (ref 27–34)
MCHC RBC-ENTMCNC: 34 % — SIGNIFICANT CHANGE UP (ref 32–36)
MCV RBC AUTO: 95.4 FL — SIGNIFICANT CHANGE UP (ref 80–100)
MONOCYTES # BLD AUTO: 1.04 K/UL — HIGH (ref 0–0.9)
MONOCYTES NFR BLD AUTO: 11.5 % — SIGNIFICANT CHANGE UP (ref 2–14)
NEUTROPHILS # BLD AUTO: 4.91 K/UL — SIGNIFICANT CHANGE UP (ref 1.8–7.4)
NEUTROPHILS NFR BLD AUTO: 54.4 % — SIGNIFICANT CHANGE UP (ref 43–77)
NRBC # FLD: 0 K/UL — SIGNIFICANT CHANGE UP (ref 0–0)
PLATELET # BLD AUTO: 169 K/UL — SIGNIFICANT CHANGE UP (ref 150–400)
PMV BLD: 11.4 FL — SIGNIFICANT CHANGE UP (ref 7–13)
POTASSIUM SERPL-MCNC: 3.5 MMOL/L — SIGNIFICANT CHANGE UP (ref 3.5–5.3)
POTASSIUM SERPL-MCNC: 3.5 MMOL/L — SIGNIFICANT CHANGE UP (ref 3.5–5.3)
POTASSIUM SERPL-SCNC: 3.5 MMOL/L — SIGNIFICANT CHANGE UP (ref 3.5–5.3)
POTASSIUM SERPL-SCNC: 3.5 MMOL/L — SIGNIFICANT CHANGE UP (ref 3.5–5.3)
PROT SERPL-MCNC: 7.9 G/DL — SIGNIFICANT CHANGE UP (ref 6–8.3)
PROTHROM AB SERPL-ACNC: 12.3 SEC — SIGNIFICANT CHANGE UP (ref 9.8–13.1)
RBC # BLD: 3.92 M/UL — SIGNIFICANT CHANGE UP (ref 3.8–5.2)
RBC # FLD: 12.9 % — SIGNIFICANT CHANGE UP (ref 10.3–14.5)
SODIUM SERPL-SCNC: 132 MMOL/L — LOW (ref 135–145)
SODIUM SERPL-SCNC: 132 MMOL/L — LOW (ref 135–145)
TROPONIN T, HIGH SENSITIVITY: 17 NG/L — SIGNIFICANT CHANGE UP (ref ?–14)
TROPONIN T, HIGH SENSITIVITY: 18 NG/L — SIGNIFICANT CHANGE UP (ref ?–14)
TROPONIN T, HIGH SENSITIVITY: 18 NG/L — SIGNIFICANT CHANGE UP (ref ?–14)
WBC # BLD: 9.04 K/UL — SIGNIFICANT CHANGE UP (ref 3.8–10.5)
WBC # FLD AUTO: 9.04 K/UL — SIGNIFICANT CHANGE UP (ref 3.8–10.5)

## 2019-12-20 PROCEDURE — 71046 X-RAY EXAM CHEST 2 VIEWS: CPT | Mod: 26

## 2019-12-20 RX ORDER — CLOPIDOGREL BISULFATE 75 MG/1
75 TABLET, FILM COATED ORAL DAILY
Refills: 0 | Status: DISCONTINUED | OUTPATIENT
Start: 2019-12-21 | End: 2019-12-31

## 2019-12-20 RX ORDER — ASPIRIN/CALCIUM CARB/MAGNESIUM 324 MG
162 TABLET ORAL ONCE
Refills: 0 | Status: COMPLETED | OUTPATIENT
Start: 2019-12-20 | End: 2019-12-20

## 2019-12-20 RX ORDER — ASPIRIN/CALCIUM CARB/MAGNESIUM 324 MG
81 TABLET ORAL DAILY
Refills: 0 | Status: DISCONTINUED | OUTPATIENT
Start: 2019-12-20 | End: 2019-12-31

## 2019-12-20 RX ORDER — PYRIDOXINE HCL (VITAMIN B6) 100 MG
100 TABLET ORAL DAILY
Refills: 0 | Status: DISCONTINUED | OUTPATIENT
Start: 2019-12-20 | End: 2019-12-31

## 2019-12-20 RX ORDER — SODIUM CHLORIDE 9 MG/ML
3 INJECTION INTRAMUSCULAR; INTRAVENOUS; SUBCUTANEOUS EVERY 8 HOURS
Refills: 0 | Status: DISCONTINUED | OUTPATIENT
Start: 2019-12-20 | End: 2019-12-31

## 2019-12-20 RX ORDER — FOLIC ACID 0.8 MG
1 TABLET ORAL DAILY
Refills: 0 | Status: DISCONTINUED | OUTPATIENT
Start: 2019-12-20 | End: 2019-12-31

## 2019-12-20 RX ORDER — ATORVASTATIN CALCIUM 80 MG/1
40 TABLET, FILM COATED ORAL AT BEDTIME
Refills: 0 | Status: DISCONTINUED | OUTPATIENT
Start: 2019-12-20 | End: 2019-12-31

## 2019-12-20 RX ORDER — METOPROLOL TARTRATE 50 MG
50 TABLET ORAL DAILY
Refills: 0 | Status: DISCONTINUED | OUTPATIENT
Start: 2019-12-20 | End: 2019-12-24

## 2019-12-20 RX ORDER — GABAPENTIN 400 MG/1
100 CAPSULE ORAL DAILY
Refills: 0 | Status: DISCONTINUED | OUTPATIENT
Start: 2019-12-20 | End: 2019-12-31

## 2019-12-20 RX ORDER — PREGABALIN 225 MG/1
100 CAPSULE ORAL DAILY
Refills: 0 | Status: DISCONTINUED | OUTPATIENT
Start: 2019-12-20 | End: 2019-12-20

## 2019-12-20 RX ADMIN — ATORVASTATIN CALCIUM 40 MILLIGRAM(S): 80 TABLET, FILM COATED ORAL at 23:55

## 2019-12-20 RX ADMIN — GABAPENTIN 100 MILLIGRAM(S): 400 CAPSULE ORAL at 23:55

## 2019-12-20 RX ADMIN — SODIUM CHLORIDE 3 MILLILITER(S): 9 INJECTION INTRAMUSCULAR; INTRAVENOUS; SUBCUTANEOUS at 21:20

## 2019-12-20 RX ADMIN — Medication 162 MILLIGRAM(S): at 15:43

## 2019-12-20 NOTE — ED ADULT TRIAGE NOTE - CHIEF COMPLAINT QUOTE
Pt c/o intermittent midsternal CP that worsens on exertion. Pt had stent placed 1 week ago but pain persists. States no pain in triage.  Currently on plavix

## 2019-12-20 NOTE — H&P ADULT - PROBLEM SELECTOR PLAN 3
Continue to monitor. Continue Meloxicam. Continue to monitor. Meloxicam therapeutic interchange is celecoxib which contains sulfa which patient is allergic to.

## 2019-12-20 NOTE — H&P ADULT - HISTORY OF PRESENT ILLNESS
75 y/o F with PMH of HTN, CAD (s/p cath with 1 stent to mRCA 1 week ago at [facility]), Left ventricular outflow obstruction 75 y/o F with PMH of HTN, CAD (s/p cath with 1 stent to mRCA 1 week ago at [facility]), Left ventricular outflow obstruction, spinal stenosis presents to the ED complaining of chest pain. Patient states that for the last 3 months she has been having exertional chest pressure. Patient states that pain is midsternal. Patient states 1 week ago she underwent cardiac cath where 1 stent was placed in the mRCA. Patient states that she was discharged and that pain restarted 1 day after discharge. Patient states that pain is worse with eating, exertion and laying down and sometimes radiates to left arm. Patient also endorses dyspnea on exertion. Patient states that she is compliant with her medications at home. Patient denies fever, chills, palpitations. 73 y/o F with PMH of HTN, CAD (s/p cath with 1 stent to mRCA 9 days ago at [facility]), Left ventricular outflow obstruction, spinal stenosis presents to the ED complaining of chest pain. Patient states that for the last 3 months she has been having exertional chest pressure. Patient states that pain is midsternal. Patient states 9 days ago she underwent cardiac cath where 1 stent was placed in the mRCA. Patient states that she was discharged and that pain restarted 1 day after discharge. Patient states that pain is worse with eating, exertion and laying down and sometimes radiates to left arm. She is unable to describe whether it is sharp or dull but states that it is intermittent. Patient also endorses dyspnea on exertion when walking. Patient states that she is compliant with her medications at home. Patient denies fever, chills, palpitations.

## 2019-12-20 NOTE — ED ADULT NURSE NOTE - OBJECTIVE STATEMENT
Pt received to room 11 complaining of chest pain. pt states she had a stent placed 1 week ago and is having pain since prior to stent placement. Pt states she spoke to her cardiologist who told her to come to the ED. Pt placed on cardiac monitor. Pt denies SOB, N/V/D, fever or chills. IV access obtained, labs drawn and sent.

## 2019-12-20 NOTE — ED PROVIDER NOTE - ATTENDING CONTRIBUTION TO CARE
74F with hx of CAD s/p stent placement 8 days ago at [facility] p/w CP. patient states pain has been present for the past 3 months with exertion, worse since stent placement. no sob, cough.     Cardiologist Dr. Christianson    ***GEN - NAD; well appearing; A+O x3 ***HEAD - NC/AT ***EYES/NOSE - PERRL, EOMI, mucous membranes moist, no discharge ***THROAT: Oral cavity and pharynx normal. No inflammation, swelling, exudate, or lesions.  ***NECK: Neck supple  ***PULMONARY - CTA b/l, symmetric breath sounds. ***CARDIAC -s1s2, RRR, (+) systolic murmur  ***ABDOMEN - +BS, ND, NT, soft, no guarding, no rebound, no masses   ***BACK - no CVA tenderness, Normal  spine ***EXTREMITIES - symmetric pulses, 2+ dp, capillary refill < 2 seconds, no clubbing, no cyanosis, no edema ***SKIN - no rash or bruising   ***NEUROLOGIC - alert, CN 2-12 intact, gait nl, ***PSYCH - insight and judgment nl, memory nl, affect nl, thought nl     MDM: 74F with persistent CP after stent. labs, cxr, ekg. cardiology eval.

## 2019-12-20 NOTE — H&P ADULT - NSHPLABSRESULTS_GEN_ALL_CORE
Vital Signs Last 24 Hrs  T(C): 36.7 (20 Dec 2019 20:49), Max: 36.7 (20 Dec 2019 17:17)  T(F): 98 (20 Dec 2019 20:49), Max: 98.1 (20 Dec 2019 17:17)  HR: 67 (20 Dec 2019 20:49) (60 - 71)  BP: 103/64 (20 Dec 2019 20:49) (103/64 - 163/93)  BP(mean): --  RR: 17 (20 Dec 2019 20:49) (15 - 17)  SpO2: 96% (20 Dec 2019 20:49) (96% - 99%)    CBC Full  -  ( 20 Dec 2019 15:10 )  WBC Count : 9.04 K/uL  RBC Count : 3.92 M/uL  Hemoglobin : 12.7 g/dL  Hematocrit : 37.4 %  Platelet Count - Automated : 169 K/uL  Mean Cell Volume : 95.4 fL  Mean Cell Hemoglobin : 32.4 pg  Mean Cell Hemoglobin Concentration : 34.0 %  Auto Neutrophil # : 4.91 K/uL  Auto Lymphocyte # : 2.82 K/uL  Auto Monocyte # : 1.04 K/uL  Auto Eosinophil # : 0.21 K/uL  Auto Basophil # : 0.04 K/uL  Auto Neutrophil % : 54.4 %  Auto Lymphocyte % : 31.2 %  Auto Monocyte % : 11.5 %  Auto Eosinophil % : 2.3 %  Auto Basophil % : 0.4 %      12-20    132<L>  |  94<L>  |  16  ----------------------------<  134<H>  3.5   |  24  |  0.70    Ca    9.6      20 Dec 2019 15:10    TPro  7.9  /  Alb  4.2  /  TBili  0.3  /  DBili  x   /  AST  31  /  ALT  26  /  AlkPhos  92  12-20    PT 12.3, INR 1.08,Aptt 33.2    Troponin 18-->18-->17  CKMB 10.70-->8.77  CKMB Index 2.2-->2.2  -->408    CXR: Clear lungs.    12/11 Cardiac cath:  CORONARY VESSELS: The coronary circulation is right dominant.  LM:   --  LM: Angiography showed minor luminal irregularities with no flow  limiting lesions.  LAD:   --  LAD: Angiography showed minor luminal irregularities with no  flow limiting lesions.  CX:   --  Circumflex: Angiography showed minor luminal irregularities with  no flow limiting lesions.  RCA:   --  Proximal RCA: There was a 70 % stenosis.  COMPLICATIONS: There were no complications.  DIAGNOSTIC RECOMMENDATIONS: IFR was 0.78 in the RCA  INTERVENTIONAL RECOMMENDATIONS: IFR was 0.78 in the RCA Vital Signs Last 24 Hrs  T(C): 36.7 (20 Dec 2019 20:49), Max: 36.7 (20 Dec 2019 17:17)  T(F): 98 (20 Dec 2019 20:49), Max: 98.1 (20 Dec 2019 17:17)  HR: 67 (20 Dec 2019 20:49) (60 - 71)  BP: 103/64 (20 Dec 2019 20:49) (103/64 - 163/93)  BP(mean): --  RR: 17 (20 Dec 2019 20:49) (15 - 17)  SpO2: 96% (20 Dec 2019 20:49) (96% - 99%)    CBC Full  -  ( 20 Dec 2019 15:10 )  WBC Count : 9.04 K/uL  RBC Count : 3.92 M/uL  Hemoglobin : 12.7 g/dL  Hematocrit : 37.4 %  Platelet Count - Automated : 169 K/uL  Mean Cell Volume : 95.4 fL  Mean Cell Hemoglobin : 32.4 pg  Mean Cell Hemoglobin Concentration : 34.0 %  Auto Neutrophil # : 4.91 K/uL  Auto Lymphocyte # : 2.82 K/uL  Auto Monocyte # : 1.04 K/uL  Auto Eosinophil # : 0.21 K/uL  Auto Basophil # : 0.04 K/uL  Auto Neutrophil % : 54.4 %  Auto Lymphocyte % : 31.2 %  Auto Monocyte % : 11.5 %  Auto Eosinophil % : 2.3 %  Auto Basophil % : 0.4 %      12-20    132<L>  |  94<L>  |  16  ----------------------------<  134<H>  3.5   |  24  |  0.70    Ca    9.6      20 Dec 2019 15:10    TPro  7.9  /  Alb  4.2  /  TBili  0.3  /  DBili  x   /  AST  31  /  ALT  26  /  AlkPhos  92  12-20    PT 12.3, INR 1.08,Aptt 33.2    EKG: Sinus rhythm at 64 bpm. QT/QTc 458/472. No significant changes from EKG on 12/11.  Troponin 18-->18-->17  CKMB 10.70-->8.77  CKMB Index 2.2-->2.2  -->408    CXR: Clear lungs.    12/11 Cardiac cath:  CORONARY VESSELS: The coronary circulation is right dominant.  LM:   --  LM: Angiography showed minor luminal irregularities with no flow  limiting lesions.  LAD:   --  LAD: Angiography showed minor luminal irregularities with no  flow limiting lesions.  CX:   --  Circumflex: Angiography showed minor luminal irregularities with  no flow limiting lesions.  RCA:   --  Proximal RCA: There was a 70 % stenosis.  COMPLICATIONS: There were no complications.  DIAGNOSTIC RECOMMENDATIONS: IFR was 0.78 in the RCA  INTERVENTIONAL RECOMMENDATIONS: IFR was 0.78 in the RCA Vital Signs Last 24 Hrs  T(C): 36.7 (20 Dec 2019 20:49), Max: 36.7 (20 Dec 2019 17:17)  T(F): 98 (20 Dec 2019 20:49), Max: 98.1 (20 Dec 2019 17:17)  HR: 67 (20 Dec 2019 20:49) (60 - 71)  BP: 103/64 (20 Dec 2019 20:49) (103/64 - 163/93)  BP(mean): --  RR: 17 (20 Dec 2019 20:49) (15 - 17)  SpO2: 96% (20 Dec 2019 20:49) (96% - 99%)    CBC Full  -  ( 20 Dec 2019 15:10 )  WBC Count : 9.04 K/uL  RBC Count : 3.92 M/uL  Hemoglobin : 12.7 g/dL  Hematocrit : 37.4 %  Platelet Count - Automated : 169 K/uL  Mean Cell Volume : 95.4 fL  Mean Cell Hemoglobin : 32.4 pg  Mean Cell Hemoglobin Concentration : 34.0 %  Auto Neutrophil # : 4.91 K/uL  Auto Lymphocyte # : 2.82 K/uL  Auto Monocyte # : 1.04 K/uL  Auto Eosinophil # : 0.21 K/uL  Auto Basophil # : 0.04 K/uL  Auto Neutrophil % : 54.4 %  Auto Lymphocyte % : 31.2 %  Auto Monocyte % : 11.5 %  Auto Eosinophil % : 2.3 %  Auto Basophil % : 0.4 %      12-20    132<L>  |  94<L>  |  16  ----------------------------<  134<H>  3.5   |  24  |  0.70    Ca    9.6      20 Dec 2019 15:10    TPro  7.9  /  Alb  4.2  /  TBili  0.3  /  DBili  x   /  AST  31  /  ALT  26  /  AlkPhos  92  12-20    PT 12.3, INR 1.08,Aptt 33.2    EKG: Sinus rhythm at 64 bpm. QT/QTc 458/472. No significant changes from EKG on 12/11.  Troponin 18-->18-->17  CKMB 10.70-->8.77  CKMB Index 2.2-->2.2  -->408    CXR: Clear lungs.    12/11 Cardiac cath:  CORONARY VESSELS: The coronary circulation is right dominant.  LM:   --  LM: Angiography showed minor luminal irregularities with no flow  limiting lesions.  LAD:   --  LAD: Angiography showed minor luminal irregularities with no  flow limiting lesions.  CX:   --  Circumflex: Angiography showed minor luminal irregularities with  no flow limiting lesions.  RCA:   --  Proximal RCA: There was a 70 % stenosis.  COMPLICATIONS: There were no complications.  DIAGNOSTIC RECOMMENDATIONS: IFR was 0.78 in the RCA  INTERVENTIONAL RECOMMENDATIONS: IFR was 0.78 in the RCA    Echo 4/15/19  EF 74%  1. Mitral annular calcification and calcified mitral  leaflets with normal diastolic opening. Moderate mitral  regurgitation.  2. Calcified trileaflet aortic valve with normal opening.  3. Normal left ventricular internal dimensions and wall  thicknesses.  4. Normal left ventricular systolic function. No segmental  wall motion abnormalities.  5. Normal right ventricular size and function.  *** Compared with echocardiogram of 9/5/2018, LV function  has significantly improved.

## 2019-12-20 NOTE — H&P ADULT - PROBLEM SELECTOR PLAN 1
Admit to tele, continue monitoring. Troponin 18->18->17. CKMB index 2.2. Pt s/p cath on 12/11 with stent to mRCA. Currently pain free. Echo ordered. Admit to tele, continue monitoring. Troponin 18->18->17. CKMB index 2.2. Pt s/p cath on 12/11 with stent to mRCA. Currently pain free. Echo ordered. Follow up with Dr. Christianson if stress test needed.

## 2019-12-20 NOTE — H&P ADULT - MUSCULOSKELETAL
details… detailed exam ROM intact/no joint swelling/no joint erythema/no calf tenderness normal strength/ROM intact/no joint swelling/no joint erythema/no calf tenderness

## 2019-12-20 NOTE — ED PROVIDER NOTE - OBJECTIVE STATEMENT
Mongolian speaking, suggesting grandson at bedside to translate:  75 y/o female with pmhx of HTN, CAD with one stent placed at [facility] 1 week ago, presents to ED c/o acute on chronic CP. Pt states she has had exertional chest pressure x 3 months. Pt states had stent placed 1 week ago and pain has gotten worse. Intermittent, worse with eating and going up stairs. +MORENO. Sometimes radiates down left arm. On ASA and Plavix. As per outpt cardiologist, sent to ED. No estrogen use, recent travel, le edema, calf pain, hx of dvt/pe. Nonsmoker. No fever, chills, palpitations, cough, abd pain, n/v, diaphoresis, weakness, numbness, tingling.

## 2019-12-20 NOTE — H&P ADULT - ASSESSMENT
73 y/o F with PMH of HTN, CAD (s/p cath with 1 stent to mRCA 1 week ago at [facility]), Left ventricular outflow obstruction, spinal stenosis presents to the ED complaining of chest pain. 75 y/o F with PMH of HTN, CAD (s/p cath with 1 stent to mRCA 1 week ago at [facility]), Left ventricular outflow obstruction, spinal stenosis presents to the ED complaining of chest pain. Case discussed with Dr. Arroyo.

## 2019-12-20 NOTE — H&P ADULT - NEUROLOGICAL DETAILS
alert and oriented x 3/responds to verbal commands/sensation intact/cranial nerves intact/normal strength

## 2019-12-20 NOTE — ED PROVIDER NOTE - CLINICAL SUMMARY MEDICAL DECISION MAKING FREE TEXT BOX
75 y/o female with pmhx of HTN, CAD with one stent placed at [facility] 1 week ago in RCA, presents to ED c/o acute on chronic CP x 3 months, worse since having stent placed x 1 week. +MORENO. +murmur on exam, no hx of murmur in past. plan to admit to cardiologist for echo. ekg with no acute signs of ischemia.

## 2019-12-21 LAB
ANION GAP SERPL CALC-SCNC: 13 MMO/L — SIGNIFICANT CHANGE UP (ref 7–14)
BASOPHILS # BLD AUTO: 0.03 K/UL — SIGNIFICANT CHANGE UP (ref 0–0.2)
BASOPHILS NFR BLD AUTO: 0.4 % — SIGNIFICANT CHANGE UP (ref 0–2)
BUN SERPL-MCNC: 12 MG/DL — SIGNIFICANT CHANGE UP (ref 7–23)
CALCIUM SERPL-MCNC: 8.8 MG/DL — SIGNIFICANT CHANGE UP (ref 8.4–10.5)
CHLORIDE SERPL-SCNC: 96 MMOL/L — LOW (ref 98–107)
CHOLEST SERPL-MCNC: 92 MG/DL — LOW (ref 120–199)
CO2 SERPL-SCNC: 21 MMOL/L — LOW (ref 22–31)
CREAT SERPL-MCNC: 0.64 MG/DL — SIGNIFICANT CHANGE UP (ref 0.5–1.3)
EOSINOPHIL # BLD AUTO: 0.16 K/UL — SIGNIFICANT CHANGE UP (ref 0–0.5)
EOSINOPHIL NFR BLD AUTO: 2 % — SIGNIFICANT CHANGE UP (ref 0–6)
GLUCOSE SERPL-MCNC: 123 MG/DL — HIGH (ref 70–99)
HBA1C BLD-MCNC: 6.3 % — HIGH (ref 4–5.6)
HCT VFR BLD CALC: 36.4 % — SIGNIFICANT CHANGE UP (ref 34.5–45)
HDLC SERPL-MCNC: 43 MG/DL — LOW (ref 45–65)
HGB BLD-MCNC: 12.3 G/DL — SIGNIFICANT CHANGE UP (ref 11.5–15.5)
IMM GRANULOCYTES NFR BLD AUTO: 0.1 % — SIGNIFICANT CHANGE UP (ref 0–1.5)
LIPID PNL WITH DIRECT LDL SERPL: 44 MG/DL — SIGNIFICANT CHANGE UP
LYMPHOCYTES # BLD AUTO: 2.75 K/UL — SIGNIFICANT CHANGE UP (ref 1–3.3)
LYMPHOCYTES # BLD AUTO: 35.2 % — SIGNIFICANT CHANGE UP (ref 13–44)
MAGNESIUM SERPL-MCNC: 2.1 MG/DL — SIGNIFICANT CHANGE UP (ref 1.6–2.6)
MCHC RBC-ENTMCNC: 32.5 PG — SIGNIFICANT CHANGE UP (ref 27–34)
MCHC RBC-ENTMCNC: 33.8 % — SIGNIFICANT CHANGE UP (ref 32–36)
MCV RBC AUTO: 96 FL — SIGNIFICANT CHANGE UP (ref 80–100)
MONOCYTES # BLD AUTO: 0.8 K/UL — SIGNIFICANT CHANGE UP (ref 0–0.9)
MONOCYTES NFR BLD AUTO: 10.2 % — SIGNIFICANT CHANGE UP (ref 2–14)
NEUTROPHILS # BLD AUTO: 4.07 K/UL — SIGNIFICANT CHANGE UP (ref 1.8–7.4)
NEUTROPHILS NFR BLD AUTO: 52.1 % — SIGNIFICANT CHANGE UP (ref 43–77)
NRBC # FLD: 0 K/UL — SIGNIFICANT CHANGE UP (ref 0–0)
PHOSPHATE SERPL-MCNC: 2.7 MG/DL — SIGNIFICANT CHANGE UP (ref 2.5–4.5)
PLATELET # BLD AUTO: 155 K/UL — SIGNIFICANT CHANGE UP (ref 150–400)
PMV BLD: 12 FL — SIGNIFICANT CHANGE UP (ref 7–13)
POTASSIUM SERPL-MCNC: 3.4 MMOL/L — LOW (ref 3.5–5.3)
POTASSIUM SERPL-SCNC: 3.4 MMOL/L — LOW (ref 3.5–5.3)
RBC # BLD: 3.79 M/UL — LOW (ref 3.8–5.2)
RBC # FLD: 12.9 % — SIGNIFICANT CHANGE UP (ref 10.3–14.5)
SODIUM SERPL-SCNC: 130 MMOL/L — LOW (ref 135–145)
TRIGL SERPL-MCNC: 73 MG/DL — SIGNIFICANT CHANGE UP (ref 10–149)
TSH SERPL-MCNC: 2.61 UIU/ML — SIGNIFICANT CHANGE UP (ref 0.27–4.2)
WBC # BLD: 7.82 K/UL — SIGNIFICANT CHANGE UP (ref 3.8–10.5)
WBC # FLD AUTO: 7.82 K/UL — SIGNIFICANT CHANGE UP (ref 3.8–10.5)

## 2019-12-21 RX ORDER — POTASSIUM CHLORIDE 20 MEQ
40 PACKET (EA) ORAL ONCE
Refills: 0 | Status: COMPLETED | OUTPATIENT
Start: 2019-12-21 | End: 2019-12-21

## 2019-12-21 RX ADMIN — SODIUM CHLORIDE 3 MILLILITER(S): 9 INJECTION INTRAMUSCULAR; INTRAVENOUS; SUBCUTANEOUS at 21:14

## 2019-12-21 RX ADMIN — GABAPENTIN 100 MILLIGRAM(S): 400 CAPSULE ORAL at 11:18

## 2019-12-21 RX ADMIN — Medication 81 MILLIGRAM(S): at 11:18

## 2019-12-21 RX ADMIN — Medication 1 MILLIGRAM(S): at 11:18

## 2019-12-21 RX ADMIN — Medication 100 MILLIGRAM(S): at 11:18

## 2019-12-21 RX ADMIN — Medication 50 MILLIGRAM(S): at 05:18

## 2019-12-21 RX ADMIN — CLOPIDOGREL BISULFATE 75 MILLIGRAM(S): 75 TABLET, FILM COATED ORAL at 11:18

## 2019-12-21 RX ADMIN — SODIUM CHLORIDE 3 MILLILITER(S): 9 INJECTION INTRAMUSCULAR; INTRAVENOUS; SUBCUTANEOUS at 13:58

## 2019-12-21 RX ADMIN — Medication 40 MILLIEQUIVALENT(S): at 11:18

## 2019-12-21 RX ADMIN — SODIUM CHLORIDE 3 MILLILITER(S): 9 INJECTION INTRAMUSCULAR; INTRAVENOUS; SUBCUTANEOUS at 05:19

## 2019-12-21 RX ADMIN — ATORVASTATIN CALCIUM 40 MILLIGRAM(S): 80 TABLET, FILM COATED ORAL at 21:14

## 2019-12-21 NOTE — PROGRESS NOTE ADULT - PROBLEM SELECTOR PLAN 5
Monitor lipid levels. Continue statin. Continue to monitor. Start DASH diet, continue Metoprolol 50 mg daily.

## 2019-12-21 NOTE — PROGRESS NOTE ADULT - PROBLEM SELECTOR PLAN 4
Continue to monitor. Start DASH diet, continue Metoprolol 50 mg daily. Continue to monitor. Meloxicam therapeutic interchange is celecoxib which contains sulfa which patient is allergic to.

## 2019-12-21 NOTE — PROGRESS NOTE ADULT - SUBJECTIVE AND OBJECTIVE BOX
[facility]    Subjective: Patient seen and examined. No new events except as noted.   doing well today     REVIEW OF SYSTEMS:    CONSTITUTIONAL: No weakness, fevers or chills  EYES/ENT: No visual changes;  No vertigo or throat pain   NECK: No pain or stiffness  RESPIRATORY: No cough, wheezing, hemoptysis; No shortness of breath  CARDIOVASCULAR: No chest pain or palpitations  GASTROINTESTINAL: No abdominal or epigastric pain.  GENITOURINARY: No dysuria, frequency or hematuria  NEUROLOGICAL: No numbness or weakness  SKIN: No itching, burning, rashes, or lesions   All other review of systems is negative unless indicated above.    MEDICATIONS:  MEDICATIONS  (STANDING):  aspirin enteric coated 81 milliGRAM(s) Oral daily  atorvastatin 40 milliGRAM(s) Oral at bedtime  clopidogrel Tablet 75 milliGRAM(s) Oral daily  Cyanocobalamin 100 mcg Tablet 100 MICROGram(s) 1 Tablet(s) Oral daily  folic acid 1 milliGRAM(s) Oral daily  gabapentin 100 milliGRAM(s) Oral daily  metoprolol succinate ER 50 milliGRAM(s) Oral daily  pyridoxine 100 milliGRAM(s) Oral daily  sodium chloride 0.9% lock flush 3 milliLiter(s) IV Push every 8 hours      PHYSICAL EXAM:  T(C): 36.7 (12-21-19 @ 12:59), Max: 36.8 (12-21-19 @ 05:17)  HR: 56 (12-21-19 @ 12:59) (56 - 68)  BP: 111/52 (12-21-19 @ 12:59) (102/56 - 136/62)  RR: 17 (12-21-19 @ 12:59) (16 - 17)  SpO2: 95% (12-21-19 @ 12:59) (95% - 98%)  Wt(kg): --  I&O's Summary    Height (cm): 149.9 (12-21 @ 05:17)  Weight (kg): 74.8 (12-21 @ 05:17)  BMI (kg/m2): 33.3 (12-21 @ 05:17)  BSA (m2): 1.7 (12-21 @ 05:17)    Appearance: Normal	  HEENT:   Normal oral mucosa, PERRL, EOMI	  Lymphatic: No lymphadenopathy , no edema  Cardiovascular: Normal S1 S2, No JVD, No murmurs , Peripheral pulses palpable 2+ bilaterally  Respiratory: Lungs clear to auscultation, normal effort 	  Gastrointestinal:  Soft, Non-tender, + BS	  Skin: No rashes, No ecchymoses, No cyanosis, warm to touch  Musculoskeletal: Normal range of motion, normal strength  Psychiatry:  Mood & affect appropriate  Ext: No edema      All labs, Imaging and EKGs personally reviewed                           12.3   7.82  )-----------( 155      ( 21 Dec 2019 06:00 )             36.4               12-21    130<L>  |  96<L>  |  12  ----------------------------<  123<H>  3.4<L>   |  21<L>  |  0.64    Ca    8.8      21 Dec 2019 06:00  Phos  2.7     12-21  Mg     2.1     12-21    TPro  7.9  /  Alb  4.2  /  TBili  0.3  /  DBili  x   /  AST  31  /  ALT  26  /  AlkPhos  92  12-20    PT/INR - ( 20 Dec 2019 15:10 )   PT: 12.3 SEC;   INR: 1.08          PTT - ( 20 Dec 2019 15:10 )  PTT:33.2 SEC       CARDIAC MARKERS ( 20 Dec 2019 21:10 )  x     / x     / 408 u/L / 8.79 ng/mL / x      CARDIAC MARKERS ( 20 Dec 2019 15:10 )  x     / x     / 491 u/L / 10.70 ng/mL / x

## 2019-12-21 NOTE — PROGRESS NOTE ADULT - PROBLEM SELECTOR PLAN 3
Continue to monitor. Meloxicam therapeutic interchange is celecoxib which contains sulfa which patient is allergic to. Continue to monitor.

## 2019-12-22 LAB
ANION GAP SERPL CALC-SCNC: 10 MMO/L — SIGNIFICANT CHANGE UP (ref 7–14)
APPEARANCE UR: CLEAR — SIGNIFICANT CHANGE UP
BACTERIA # UR AUTO: HIGH
BILIRUB UR-MCNC: NEGATIVE — SIGNIFICANT CHANGE UP
BLOOD UR QL VISUAL: NEGATIVE — SIGNIFICANT CHANGE UP
BUN SERPL-MCNC: 13 MG/DL — SIGNIFICANT CHANGE UP (ref 7–23)
CALCIUM SERPL-MCNC: 8.6 MG/DL — SIGNIFICANT CHANGE UP (ref 8.4–10.5)
CHLORIDE SERPL-SCNC: 99 MMOL/L — SIGNIFICANT CHANGE UP (ref 98–107)
CO2 SERPL-SCNC: 21 MMOL/L — LOW (ref 22–31)
COLOR SPEC: SIGNIFICANT CHANGE UP
CREAT SERPL-MCNC: 0.63 MG/DL — SIGNIFICANT CHANGE UP (ref 0.5–1.3)
GLUCOSE SERPL-MCNC: 123 MG/DL — HIGH (ref 70–99)
GLUCOSE UR-MCNC: NEGATIVE — SIGNIFICANT CHANGE UP
HCT VFR BLD CALC: 33.9 % — LOW (ref 34.5–45)
HGB BLD-MCNC: 11.8 G/DL — SIGNIFICANT CHANGE UP (ref 11.5–15.5)
HYALINE CASTS # UR AUTO: NEGATIVE — SIGNIFICANT CHANGE UP
KETONES UR-MCNC: NEGATIVE — SIGNIFICANT CHANGE UP
LEUKOCYTE ESTERASE UR-ACNC: SIGNIFICANT CHANGE UP
MAGNESIUM SERPL-MCNC: 2.2 MG/DL — SIGNIFICANT CHANGE UP (ref 1.6–2.6)
MCHC RBC-ENTMCNC: 32.6 PG — SIGNIFICANT CHANGE UP (ref 27–34)
MCHC RBC-ENTMCNC: 34.8 % — SIGNIFICANT CHANGE UP (ref 32–36)
MCV RBC AUTO: 93.6 FL — SIGNIFICANT CHANGE UP (ref 80–100)
NITRITE UR-MCNC: NEGATIVE — SIGNIFICANT CHANGE UP
NRBC # FLD: 0 K/UL — SIGNIFICANT CHANGE UP (ref 0–0)
OSMOLALITY SERPL: 283 MOSMO/KG — SIGNIFICANT CHANGE UP (ref 275–295)
OSMOLALITY UR: 292 MOSMO/KG — SIGNIFICANT CHANGE UP (ref 50–1200)
PH UR: 6 — SIGNIFICANT CHANGE UP (ref 5–8)
PHOSPHATE SERPL-MCNC: 3 MG/DL — SIGNIFICANT CHANGE UP (ref 2.5–4.5)
PLATELET # BLD AUTO: 140 K/UL — LOW (ref 150–400)
PMV BLD: 11.5 FL — SIGNIFICANT CHANGE UP (ref 7–13)
POTASSIUM SERPL-MCNC: 3.9 MMOL/L — SIGNIFICANT CHANGE UP (ref 3.5–5.3)
POTASSIUM SERPL-SCNC: 3.9 MMOL/L — SIGNIFICANT CHANGE UP (ref 3.5–5.3)
PROT UR-MCNC: NEGATIVE — SIGNIFICANT CHANGE UP
RBC # BLD: 3.62 M/UL — LOW (ref 3.8–5.2)
RBC # FLD: 12.7 % — SIGNIFICANT CHANGE UP (ref 10.3–14.5)
RBC CASTS # UR COMP ASSIST: SIGNIFICANT CHANGE UP (ref 0–?)
SODIUM SERPL-SCNC: 130 MMOL/L — LOW (ref 135–145)
SP GR SPEC: 1.01 — SIGNIFICANT CHANGE UP (ref 1–1.04)
SQUAMOUS # UR AUTO: SIGNIFICANT CHANGE UP
URATE SERPL-MCNC: 5.5 MG/DL — SIGNIFICANT CHANGE UP (ref 2.5–7)
UROBILINOGEN FLD QL: NORMAL — SIGNIFICANT CHANGE UP
WBC # BLD: 7.01 K/UL — SIGNIFICANT CHANGE UP (ref 3.8–10.5)
WBC # FLD AUTO: 7.01 K/UL — SIGNIFICANT CHANGE UP (ref 3.8–10.5)
WBC UR QL: HIGH (ref 0–?)

## 2019-12-22 RX ORDER — SODIUM CHLORIDE 9 MG/ML
1 INJECTION INTRAMUSCULAR; INTRAVENOUS; SUBCUTANEOUS
Refills: 0 | Status: DISCONTINUED | OUTPATIENT
Start: 2019-12-22 | End: 2019-12-24

## 2019-12-22 RX ORDER — SODIUM CHLORIDE 0.65 %
1 AEROSOL, SPRAY (ML) NASAL
Refills: 0 | Status: DISCONTINUED | OUTPATIENT
Start: 2019-12-22 | End: 2019-12-31

## 2019-12-22 RX ORDER — LANOLIN ALCOHOL/MO/W.PET/CERES
3 CREAM (GRAM) TOPICAL ONCE
Refills: 0 | Status: COMPLETED | OUTPATIENT
Start: 2019-12-22 | End: 2019-12-22

## 2019-12-22 RX ORDER — LANOLIN ALCOHOL/MO/W.PET/CERES
3 CREAM (GRAM) TOPICAL AT BEDTIME
Refills: 0 | Status: DISCONTINUED | OUTPATIENT
Start: 2019-12-22 | End: 2019-12-31

## 2019-12-22 RX ADMIN — Medication 81 MILLIGRAM(S): at 12:22

## 2019-12-22 RX ADMIN — SODIUM CHLORIDE 3 MILLILITER(S): 9 INJECTION INTRAMUSCULAR; INTRAVENOUS; SUBCUTANEOUS at 14:08

## 2019-12-22 RX ADMIN — Medication 3 MILLIGRAM(S): at 22:56

## 2019-12-22 RX ADMIN — GABAPENTIN 100 MILLIGRAM(S): 400 CAPSULE ORAL at 12:23

## 2019-12-22 RX ADMIN — Medication 1 SPRAY(S): at 22:56

## 2019-12-22 RX ADMIN — CLOPIDOGREL BISULFATE 75 MILLIGRAM(S): 75 TABLET, FILM COATED ORAL at 12:22

## 2019-12-22 RX ADMIN — SODIUM CHLORIDE 1 GRAM(S): 9 INJECTION INTRAMUSCULAR; INTRAVENOUS; SUBCUTANEOUS at 17:20

## 2019-12-22 RX ADMIN — Medication 1 MILLIGRAM(S): at 12:22

## 2019-12-22 RX ADMIN — Medication 3 MILLIGRAM(S): at 00:33

## 2019-12-22 RX ADMIN — SODIUM CHLORIDE 3 MILLILITER(S): 9 INJECTION INTRAMUSCULAR; INTRAVENOUS; SUBCUTANEOUS at 21:21

## 2019-12-22 RX ADMIN — Medication 100 MILLIGRAM(S): at 12:22

## 2019-12-22 RX ADMIN — SODIUM CHLORIDE 3 MILLILITER(S): 9 INJECTION INTRAMUSCULAR; INTRAVENOUS; SUBCUTANEOUS at 05:34

## 2019-12-22 RX ADMIN — ATORVASTATIN CALCIUM 40 MILLIGRAM(S): 80 TABLET, FILM COATED ORAL at 21:22

## 2019-12-22 NOTE — PROGRESS NOTE ADULT - SUBJECTIVE AND OBJECTIVE BOX
[facility]    Subjective: Patient seen and examined. No new events except as noted.   plan for cath tomorrow    REVIEW OF SYSTEMS:    CONSTITUTIONAL: No weakness, fevers or chills  EYES/ENT: No visual changes;  No vertigo or throat pain   NECK: No pain or stiffness  RESPIRATORY: No cough, wheezing, hemoptysis; No shortness of breath  CARDIOVASCULAR: No chest pain or palpitations  GASTROINTESTINAL: No abdominal or epigastric pain. No nausea, vomiting, or hematemesis; No diarrhea or constipation. No melena or hematochezia.  GENITOURINARY: No dysuria, frequency or hematuria  NEUROLOGICAL: No numbness or weakness  SKIN: No itching, burning, rashes, or lesions   All other review of systems is negative unless indicated above.    MEDICATIONS:  MEDICATIONS  (STANDING):  aspirin enteric coated 81 milliGRAM(s) Oral daily  atorvastatin 40 milliGRAM(s) Oral at bedtime  clopidogrel Tablet 75 milliGRAM(s) Oral daily  Cyanocobalamin 100 mcg Tablet 100 MICROGram(s) 1 Tablet(s) Oral daily  folic acid 1 milliGRAM(s) Oral daily  gabapentin 100 milliGRAM(s) Oral daily  melatonin 3 milliGRAM(s) Oral at bedtime  metoprolol succinate ER 50 milliGRAM(s) Oral daily  pyridoxine 100 milliGRAM(s) Oral daily  sodium chloride 1 Gram(s) Oral two times a day  sodium chloride 0.65% Nasal 1 Spray(s) Both Nostrils four times a day  sodium chloride 0.9% lock flush 3 milliLiter(s) IV Push every 8 hours      PHYSICAL EXAM:  T(C): 36.8 (19 @ 21:18), Max: 36.8 (19 @ 21:18)  HR: 66 (19 @ 21:18) (52 - 72)  BP: 112/75 (19 @ 21:18) (100/53 - 130/60)  RR: 16 (19 @ 21:18) (16 - 17)  SpO2: 97% (19 @ 21:18) (97% - 98%)  Wt(kg): --  I&O's Summary        Appearance: Normal	  HEENT:   Normal oral mucosa, PERRL, EOMI	  Lymphatic: No lymphadenopathy , no edema  Cardiovascular: Normal S1 S2, No JVD, No murmurs , Peripheral pulses palpable 2+ bilaterally  Respiratory: Lungs clear to auscultation, normal effort 	  Gastrointestinal:  Soft, Non-tender, + BS	  Skin: No rashes, No ecchymoses, No cyanosis, warm to touch  Musculoskeletal: Normal range of motion, normal strength  Psychiatry:  Mood & affect appropriate  Ext: No edema      All labs, Imaging and EKGs personally reviewed                             11.8   7.01  )-----------( 140      ( 22 Dec 2019 05:30 )             33.9               12-22    130<L>  |  99  |  13  ----------------------------<  123<H>  3.9   |  21<L>  |  0.63    Ca    8.6      22 Dec 2019 05:30  Phos  3.0     12-  Mg     2.2     12-                         Urinalysis Basic - ( 22 Dec 2019 01:15 )    Color: LIGHT YELLOW / Appearance: CLEAR / S.012 / pH: 6.0  Gluc: NEGATIVE / Ketone: NEGATIVE  / Bili: NEGATIVE / Urobili: NORMAL   Blood: NEGATIVE / Protein: NEGATIVE / Nitrite: NEGATIVE   Leuk Esterase: MODERATE / RBC: 0-2 / WBC 11-25   Sq Epi: FEW / Non Sq Epi: x / Bacteria: MODERATE

## 2019-12-22 NOTE — PROGRESS NOTE ADULT - PROBLEM SELECTOR PLAN 4
Continue to monitor. Meloxicam therapeutic interchange is celecoxib which contains sulfa which patient is allergic to.

## 2019-12-22 NOTE — CONSULT NOTE ADULT - PROBLEM SELECTOR RECOMMENDATION 9
unclear etiology  tsh wnl  Ua with sp grav 10.12  would check uric acid/am coritsol/serum osm  check urine osm  for now place on free h20 restriction to 1 liter  start nacl 1 gram po bid  trend na

## 2019-12-22 NOTE — CONSULT NOTE ADULT - SUBJECTIVE AND OBJECTIVE BOX
[facility] Consult Note Nephrology - CONSULTATION NOTE    75 y/o F with PMH of HTN, CAD (s/p cath with 1 stent to mRCA 9 days ago at [facility]), Left ventricular outflow obstruction, spinal stenosis presents to the ED complaining of chest pain. Patient states that for the last 3 months she has been having exertional chest pressure. Patient states that pain is midsternal. Patient states 9 days ago she underwent cardiac cath where 1 stent was placed in the mRCA. Patient states that she was discharged and that pain restarted 1 day after discharge. Patient states that pain is worse with eating, exertion and laying down and sometimes radiates to left arm. She is unable to describe whether it is sharp or dull but states that it is intermittent. Patient also endorses dyspnea on exertion when walking. Patient states that she is compliant with her medications at home. Patient denies fever, chills, palpitations.    Renal consult for hyponatremia--> na is 130  denies any hx of hyponatremia  not hctz as well  had some nausea  denies any f/c/n/v/d/c/sob/cp      PAST MEDICAL & SURGICAL HISTORY:  Left ventricular outflow tract obstruction  Spinal stenosis  HTN (hypertension)  History of back surgery  Status post LASIK surgery of both eyes    sulfa drugs (Swelling)    Home Medications Reviewed  Hospital Medications:   MEDICATIONS  (STANDING):  aspirin enteric coated 81 milliGRAM(s) Oral daily  atorvastatin 40 milliGRAM(s) Oral at bedtime  clopidogrel Tablet 75 milliGRAM(s) Oral daily  Cyanocobalamin 100 mcg Tablet 100 MICROGram(s) 1 Tablet(s) Oral daily  folic acid 1 milliGRAM(s) Oral daily  gabapentin 100 milliGRAM(s) Oral daily  metoprolol succinate ER 50 milliGRAM(s) Oral daily  pyridoxine 100 milliGRAM(s) Oral daily  sodium chloride 0.9% lock flush 3 milliLiter(s) IV Push every 8 hours    SOCIAL HISTORY:  Denies ETOh,Smoking,   FAMILY HISTORY:  Family history of coronary artery disease in brother: Brother passed away from CAD at age 60    REVIEW OF SYSTEMS:  CONSTITUTIONAL: No weakness, fevers or chills  EYES/ENT: No visual changes;  No vertigo or throat pain   NECK: No pain or stiffness  RESPIRATORY: No cough, wheezing, hemoptysis; No shortness of breath  CARDIOVASCULAR: No chest pain or palpitations.  GASTROINTESTINAL: No abdominal or epigastric pain. No nausea, vomiting, or hematemesis; No diarrhea or constipation. No melena or hematochezia.  GENITOURINARY: No dysuria, frequency, foamy urine, urinary urgency, incontinence or hematuria  NEUROLOGICAL: No numbness or weakness  SKIN: No itching, burning, rashes, or lesions   VASCULAR: No bilateral lower extremity edema.   All other review of systems is negative unless indicated above.    VITALS:  T(F): 97.6 (19 @ 09:30), Max: 98.1 (19 @ 12:59)  HR: 72 (19 @ 09:30)  BP: 114/65 (19 @ 09:30)  RR: 16 (19 @ 09:30)  SpO2: 98% (19 @ 09:30)  Wt(kg): --      PHYSICAL EXAM:  Constitutional: NAD  HEENT: anicteric sclera, oropharynx clear, MMM  Neck: No JVD  Respiratory: CTAB, no wheezes, rales or rhonchi  Cardiovascular: S1, S2, RRR  Gastrointestinal: BS+, soft, NT/ND  Extremities: No cyanosis or clubbing. No peripheral edema  Neurological: A/O x 3, no focal deficits  Psychiatric: Normal mood, normal affect  : No CVA tenderness. No ortiz.   Skin: No rashes      LABS:      130<L>  |  99  |  13  ----------------------------<  123<H>  3.9   |  21<L>  |  0.63    Ca    8.6      22 Dec 2019 05:30  Phos  3.0       Mg     2.2         TPro  7.9  /  Alb  4.2  /  TBili  0.3  /  DBili      /  AST  31  /  ALT  26  /  AlkPhos  92      Creatinine Trend: 0.63 <--, 0.64 <--, 0.70 <--                        11.8   7.01  )-----------( 140      ( 22 Dec 2019 05:30 )             33.9     Urine Studies:  Urinalysis Basic - ( 22 Dec 2019 01:15 )    Color: LIGHT YELLOW / Appearance: CLEAR / S.012 / pH: 6.0  Gluc: NEGATIVE / Ketone: NEGATIVE  / Bili: NEGATIVE / Urobili: NORMAL   Blood: NEGATIVE / Protein: NEGATIVE / Nitrite: NEGATIVE   Leuk Esterase: MODERATE / RBC: 0-2 / WBC 11-25   Sq Epi: FEW / Non Sq Epi:  / Bacteria: MODERATE        RADIOLOGY & ADDITIONAL STUDIES:

## 2019-12-23 LAB
ANION GAP SERPL CALC-SCNC: 11 MMO/L — SIGNIFICANT CHANGE UP (ref 7–14)
BUN SERPL-MCNC: 11 MG/DL — SIGNIFICANT CHANGE UP (ref 7–23)
CALCIUM SERPL-MCNC: 8.8 MG/DL — SIGNIFICANT CHANGE UP (ref 8.4–10.5)
CHLORIDE SERPL-SCNC: 100 MMOL/L — SIGNIFICANT CHANGE UP (ref 98–107)
CO2 SERPL-SCNC: 22 MMOL/L — SIGNIFICANT CHANGE UP (ref 22–31)
CORTIS SERPL-MCNC: 7.3 UG/DL — SIGNIFICANT CHANGE UP (ref 2.7–18.4)
CREAT SERPL-MCNC: 0.64 MG/DL — SIGNIFICANT CHANGE UP (ref 0.5–1.3)
GLUCOSE SERPL-MCNC: 136 MG/DL — HIGH (ref 70–99)
HCT VFR BLD CALC: 34.9 % — SIGNIFICANT CHANGE UP (ref 34.5–45)
HGB BLD-MCNC: 11.7 G/DL — SIGNIFICANT CHANGE UP (ref 11.5–15.5)
MAGNESIUM SERPL-MCNC: 2.4 MG/DL — SIGNIFICANT CHANGE UP (ref 1.6–2.6)
MCHC RBC-ENTMCNC: 32.4 PG — SIGNIFICANT CHANGE UP (ref 27–34)
MCHC RBC-ENTMCNC: 33.5 % — SIGNIFICANT CHANGE UP (ref 32–36)
MCV RBC AUTO: 96.7 FL — SIGNIFICANT CHANGE UP (ref 80–100)
NRBC # FLD: 0 K/UL — SIGNIFICANT CHANGE UP (ref 0–0)
PHOSPHATE SERPL-MCNC: 2.9 MG/DL — SIGNIFICANT CHANGE UP (ref 2.5–4.5)
PLATELET # BLD AUTO: 144 K/UL — LOW (ref 150–400)
PMV BLD: 11.6 FL — SIGNIFICANT CHANGE UP (ref 7–13)
POTASSIUM SERPL-MCNC: 4.1 MMOL/L — SIGNIFICANT CHANGE UP (ref 3.5–5.3)
POTASSIUM SERPL-SCNC: 4.1 MMOL/L — SIGNIFICANT CHANGE UP (ref 3.5–5.3)
RBC # BLD: 3.61 M/UL — LOW (ref 3.8–5.2)
RBC # FLD: 12.8 % — SIGNIFICANT CHANGE UP (ref 10.3–14.5)
SODIUM SERPL-SCNC: 133 MMOL/L — LOW (ref 135–145)
WBC # BLD: 7.7 K/UL — SIGNIFICANT CHANGE UP (ref 3.8–10.5)
WBC # FLD AUTO: 7.7 K/UL — SIGNIFICANT CHANGE UP (ref 3.8–10.5)

## 2019-12-23 PROCEDURE — 93454 CORONARY ARTERY ANGIO S&I: CPT | Mod: 26

## 2019-12-23 PROCEDURE — 93306 TTE W/DOPPLER COMPLETE: CPT | Mod: 26

## 2019-12-23 RX ORDER — PANTOPRAZOLE SODIUM 20 MG/1
40 TABLET, DELAYED RELEASE ORAL
Refills: 0 | Status: DISCONTINUED | OUTPATIENT
Start: 2019-12-23 | End: 2019-12-31

## 2019-12-23 RX ADMIN — SODIUM CHLORIDE 1 GRAM(S): 9 INJECTION INTRAMUSCULAR; INTRAVENOUS; SUBCUTANEOUS at 17:18

## 2019-12-23 RX ADMIN — SODIUM CHLORIDE 3 MILLILITER(S): 9 INJECTION INTRAMUSCULAR; INTRAVENOUS; SUBCUTANEOUS at 22:24

## 2019-12-23 RX ADMIN — Medication 1 MILLIGRAM(S): at 11:33

## 2019-12-23 RX ADMIN — SODIUM CHLORIDE 3 MILLILITER(S): 9 INJECTION INTRAMUSCULAR; INTRAVENOUS; SUBCUTANEOUS at 05:53

## 2019-12-23 RX ADMIN — CLOPIDOGREL BISULFATE 75 MILLIGRAM(S): 75 TABLET, FILM COATED ORAL at 11:33

## 2019-12-23 RX ADMIN — Medication 1 SPRAY(S): at 23:14

## 2019-12-23 RX ADMIN — SODIUM CHLORIDE 3 MILLILITER(S): 9 INJECTION INTRAMUSCULAR; INTRAVENOUS; SUBCUTANEOUS at 14:10

## 2019-12-23 RX ADMIN — ATORVASTATIN CALCIUM 40 MILLIGRAM(S): 80 TABLET, FILM COATED ORAL at 22:25

## 2019-12-23 RX ADMIN — Medication 1 SPRAY(S): at 11:34

## 2019-12-23 RX ADMIN — Medication 100 MILLIGRAM(S): at 11:33

## 2019-12-23 RX ADMIN — Medication 1 SPRAY(S): at 06:04

## 2019-12-23 RX ADMIN — GABAPENTIN 100 MILLIGRAM(S): 400 CAPSULE ORAL at 11:33

## 2019-12-23 RX ADMIN — Medication 1 SPRAY(S): at 17:18

## 2019-12-23 RX ADMIN — SODIUM CHLORIDE 1 GRAM(S): 9 INJECTION INTRAMUSCULAR; INTRAVENOUS; SUBCUTANEOUS at 07:00

## 2019-12-23 RX ADMIN — Medication 81 MILLIGRAM(S): at 11:33

## 2019-12-23 RX ADMIN — Medication 3 MILLIGRAM(S): at 22:25

## 2019-12-23 NOTE — CONSULT NOTE ADULT - SUBJECTIVE AND OBJECTIVE BOX
CHIEF COMPLAINT:Patient is a 74y old  Female who presents with a chief complaint of Chest pain (23 Dec 2019 11:51)      HISTORY OF PRESENT ILLNESS:HPI:  73 y/o F with PMH of HTN, CAD (s/p cath with 1 stent to mRCA 9 days ago at [facility]), Left ventricular outflow obstruction, spinal stenosis presents to the ED complaining of chest pain. Patient states that for the last 3 months she has been having exertional chest pressure. Patient states that pain is midsternal. Patient states 9 days ago she underwent cardiac cath where 1 stent was placed in the mRCA. Patient states that she was discharged and that pain restarted 1 day after discharge. Patient states that pain is worse with eating, exertion and laying down and sometimes radiates to left arm. She is unable to describe whether it is sharp or dull but states that it is intermittent. Patient also endorses dyspnea on exertion when walking. Patient states that she is compliant with her medications at home. Patient denies fever, chills, palpitations. (20 Dec 2019 21:44)      PAST MEDICAL & SURGICAL HISTORY:  Left ventricular outflow tract obstruction  Spinal stenosis  HTN (hypertension)  History of back surgery  Status post LASIK surgery of both eyes          MEDICATIONS:  aspirin enteric coated 81 milliGRAM(s) Oral daily  clopidogrel Tablet 75 milliGRAM(s) Oral daily  metoprolol succinate ER 50 milliGRAM(s) Oral daily        gabapentin 100 milliGRAM(s) Oral daily  melatonin 3 milliGRAM(s) Oral at bedtime    pantoprazole    Tablet 40 milliGRAM(s) Oral before breakfast    atorvastatin 40 milliGRAM(s) Oral at bedtime    folic acid 1 milliGRAM(s) Oral daily  pyridoxine 100 milliGRAM(s) Oral daily  sodium chloride 1 Gram(s) Oral two times a day  sodium chloride 0.65% Nasal 1 Spray(s) Both Nostrils four times a day  sodium chloride 0.9% lock flush 3 milliLiter(s) IV Push every 8 hours      FAMILY HISTORY:  Family history of coronary artery disease in brother: Brother passed away from CAD at age 60      Non-contributory    SOCIAL HISTORY:    not a smoker  Allergies    sulfa drugs (Swelling)    Intolerances    	    REVIEW OF SYSTEMS:  CONSTITUTIONAL: No fever  EYES: No eye pain, visual disturbances, or discharge  ENMT:  No difficulty hearing, tinnitus  NECK: No pain or stiffness  RESPIRATORY: No cough, wheezing,  CARDIOVASCULAR: See HPI   GASTROINTESTINAL:  No nausea, vomiting, diarrhea or constipation. No melena.  GENITOURINARY: No dysuria, hematuria  NEUROLOGICAL: No stroke like symptoms  SKIN: No burning or lesions   ENDOCRINE: No heat or cold intolerance  MUSCULOSKELETAL: No joint pain or swelling  PSYCHIATRIC: No  anxiety, mood swings  HEME/LYMPH: No bleeding gums  ALLERGY AND IMMUNOLOGIC: No hives or eczema	    All other ROS negative    PHYSICAL EXAM:  T(C): 36.9 (12-23-19 @ 16:38), Max: 36.9 (12-23-19 @ 16:38)  HR: 63 (12-23-19 @ 16:38) (55 - 66)  BP: 154/58 (12-23-19 @ 16:38) (112/75 - 154/58)  RR: 16 (12-23-19 @ 16:38) (16 - 17)  SpO2: 99% (12-23-19 @ 16:38) (94% - 99%)  Wt(kg): --  I&O's Summary      Appearance: Normal	  HEENT:   Normal oral mucosa, EOMI	  Cardiovascular: Normal S1 S2, No JVD, No murmurs  Respiratory: Lungs clear to auscultation	  Psychiatry: Alert  Gastrointestinal:  Soft, Non-tender, + BS	  Skin: No rashes   Neurologic: Non-focal  Extremities:  No edema  Vascular: Peripheral pulses palpable    	    	  	  CARDIAC MARKERS:  Labs personally reviewed by me                                  11.7   7.70  )-----------( 144      ( 23 Dec 2019 05:20 )             34.9     12-23    133<L>  |  100  |  11  ----------------------------<  136<H>  4.1   |  22  |  0.64    Ca    8.8      23 Dec 2019 05:20  Phos  2.9     12-23  Mg     2.4     12-23        TTE CONCLUSIONS:  1. Normal mitral valve. Systolic anterior motion of the  mitral valve. Moderate mitral regurgitation.  2. Mild concentric left ventricular hypertrophy. There is a  prominent basal septum (1.4 cm). The mid septum (1.0 cm).  3. Hyperdynamic left ventricle. There is evidence for  dynamic left ventricular outflow obstruction. Peak left  ventricular outflow tract gradient equals 231 mm Hg, mean  gradient is equal to 89 mm Hg, LVOT velocity time integral  equals 166 cm, consistent with severe LVOT obstruction.  4. Normal right ventricular size and function.  5. Estimated right ventricular systolic pressure equals 46  mm Hg, assuming right atrial pressure equals 10 mm Hg,  consistent with mild pulmonary hypertension.  *** Compared with echocardiogram of 4/15/2019, severe  dynamic LVOT obstruction is now seen. Mild pulmonary HTN is  now seen.    EKG: Personally reviewed by me -   Radiology: Personally reviewed by me -   cxr clear lungs    Assessment /Plan:   MORENO/SOB - cath with patent stents   TTE with Peak LV outflow tract gradient equals 231 mm Hg, mean gradient is equal to 89 mm Hg, this may very well explain her symptoms of MORENO  -- will consider Disopyramide, will consult HF service for guidance        Advanced care planning was discussed with patient/family. Goals of care was discussed with patient/family.  Differential diagnosis and plan of care discussed with patient after the evaluation.  Counseling on diet, exercise and medication compliance was done.        Harry Christianson DO [facility]  Cardiovascular Medicine  [address] [place] [address], [address]  Office [phone]  Cell [phone]

## 2019-12-23 NOTE — CHART NOTE - NSCHARTNOTEFT_GEN_A_CORE
LAVERNE MARLOW  [id number] 74y    Patient status post cath via right radial access. Site clean, dry, intact. Pulses present, capillary refill appropriate. Without hematoma. Will continue to  follow closely.

## 2019-12-23 NOTE — PROGRESS NOTE ADULT - SUBJECTIVE AND OBJECTIVE BOX
[facility]    Subjective: Patient seen and examined. No new events except as noted.   doing well   plan for cath today     REVIEW OF SYSTEMS:    CONSTITUTIONAL: No weakness, fevers or chills  EYES/ENT: No visual changes;  No vertigo or throat pain   NECK: No pain or stiffness  RESPIRATORY: No cough, wheezing, hemoptysis; No shortness of breath  CARDIOVASCULAR: No chest pain or palpitations  GASTROINTESTINAL: No abdominal or epigastric pain. No nausea, vomiting, or hematemesis; No diarrhea or constipation. No melena or hematochezia.  GENITOURINARY: No dysuria, frequency or hematuria  NEUROLOGICAL: No numbness or weakness  SKIN: No itching, burning, rashes, or lesions   All other review of systems is negative unless indicated above.    MEDICATIONS:  MEDICATIONS  (STANDING):  aspirin enteric coated 81 milliGRAM(s) Oral daily  atorvastatin 40 milliGRAM(s) Oral at bedtime  clopidogrel Tablet 75 milliGRAM(s) Oral daily  Cyanocobalamin 100 mcg Tablet 100 MICROGram(s) 1 Tablet(s) Oral daily  folic acid 1 milliGRAM(s) Oral daily  gabapentin 100 milliGRAM(s) Oral daily  melatonin 3 milliGRAM(s) Oral at bedtime  metoprolol succinate ER 50 milliGRAM(s) Oral daily  pyridoxine 100 milliGRAM(s) Oral daily  sodium chloride 1 Gram(s) Oral two times a day  sodium chloride 0.65% Nasal 1 Spray(s) Both Nostrils four times a day  sodium chloride 0.9% lock flush 3 milliLiter(s) IV Push every 8 hours      PHYSICAL EXAM:  T(C): 36.4 (19 @ 11:32), Max: 36.8 (19 @ 21:18)  HR: 55 (19 @ 11:32) (53 - 66)  BP: 151/55 (19 @ 11:32) (104/53 - 151/55)  RR: 17 (19 @ 11:32) (16 - 17)  SpO2: 64% (19 @ 11:32) (64% - 97%)  Wt(kg): --  I&O's Summary        Appearance: Normal	  HEENT:   Normal oral mucosa, PERRL, EOMI	  Lymphatic: No lymphadenopathy , no edema  Cardiovascular: Normal S1 S2, No JVD, No murmurs , Peripheral pulses palpable 2+ bilaterally  Respiratory: Lungs clear to auscultation, normal effort 	  Gastrointestinal:  Soft, Non-tender, + BS	  Skin: No rashes, No ecchymoses, No cyanosis, warm to touch  Musculoskeletal: Normal range of motion, normal strength  Psychiatry:  Mood & affect appropriate  Ext: No edema      All labs, Imaging and EKGs personally reviewed                           11.7   7.70  )-----------( 144      ( 23 Dec 2019 05:20 )             34.9               12-    133<L>  |  100  |  11  ----------------------------<  136<H>  4.1   |  22  |  0.64    Ca    8.8      23 Dec 2019 05:20  Phos  2.9     -  Mg     2.4                              Urinalysis Basic - ( 22 Dec 2019 01:15 )    Color: LIGHT YELLOW / Appearance: CLEAR / S.012 / pH: 6.0  Gluc: NEGATIVE / Ketone: NEGATIVE  / Bili: NEGATIVE / Urobili: NORMAL   Blood: NEGATIVE / Protein: NEGATIVE / Nitrite: NEGATIVE   Leuk Esterase: MODERATE / RBC: 0-2 / WBC 11-25   Sq Epi: FEW / Non Sq Epi: x / Bacteria: MODERATE

## 2019-12-24 LAB
ANION GAP SERPL CALC-SCNC: 12 MMO/L — SIGNIFICANT CHANGE UP (ref 7–14)
BUN SERPL-MCNC: 11 MG/DL — SIGNIFICANT CHANGE UP (ref 7–23)
CALCIUM SERPL-MCNC: 8.9 MG/DL — SIGNIFICANT CHANGE UP (ref 8.4–10.5)
CHLORIDE SERPL-SCNC: 102 MMOL/L — SIGNIFICANT CHANGE UP (ref 98–107)
CO2 SERPL-SCNC: 21 MMOL/L — LOW (ref 22–31)
CREAT SERPL-MCNC: 0.72 MG/DL — SIGNIFICANT CHANGE UP (ref 0.5–1.3)
GLUCOSE SERPL-MCNC: 125 MG/DL — HIGH (ref 70–99)
HCT VFR BLD CALC: 35.3 % — SIGNIFICANT CHANGE UP (ref 34.5–45)
HGB BLD-MCNC: 12 G/DL — SIGNIFICANT CHANGE UP (ref 11.5–15.5)
MAGNESIUM SERPL-MCNC: 2.3 MG/DL — SIGNIFICANT CHANGE UP (ref 1.6–2.6)
MCHC RBC-ENTMCNC: 32.3 PG — SIGNIFICANT CHANGE UP (ref 27–34)
MCHC RBC-ENTMCNC: 34 % — SIGNIFICANT CHANGE UP (ref 32–36)
MCV RBC AUTO: 95.1 FL — SIGNIFICANT CHANGE UP (ref 80–100)
NRBC # FLD: 0 K/UL — SIGNIFICANT CHANGE UP (ref 0–0)
PLATELET # BLD AUTO: 140 K/UL — LOW (ref 150–400)
PMV BLD: 11.6 FL — SIGNIFICANT CHANGE UP (ref 7–13)
POTASSIUM SERPL-MCNC: 4.1 MMOL/L — SIGNIFICANT CHANGE UP (ref 3.5–5.3)
POTASSIUM SERPL-SCNC: 4.1 MMOL/L — SIGNIFICANT CHANGE UP (ref 3.5–5.3)
RBC # BLD: 3.71 M/UL — LOW (ref 3.8–5.2)
RBC # FLD: 12.9 % — SIGNIFICANT CHANGE UP (ref 10.3–14.5)
SODIUM SERPL-SCNC: 135 MMOL/L — SIGNIFICANT CHANGE UP (ref 135–145)
WBC # BLD: 7.29 K/UL — SIGNIFICANT CHANGE UP (ref 3.8–10.5)
WBC # FLD AUTO: 7.29 K/UL — SIGNIFICANT CHANGE UP (ref 3.8–10.5)

## 2019-12-24 PROCEDURE — 93010 ELECTROCARDIOGRAM REPORT: CPT

## 2019-12-24 PROCEDURE — 99223 1ST HOSP IP/OBS HIGH 75: CPT

## 2019-12-24 RX ORDER — METOPROLOL TARTRATE 50 MG
50 TABLET ORAL
Refills: 0 | Status: DISCONTINUED | OUTPATIENT
Start: 2019-12-24 | End: 2019-12-27

## 2019-12-24 RX ORDER — DISOPYRAMIDE PHOSPHATE 100 MG
200 CAPSULE ORAL EVERY 12 HOURS
Refills: 0 | Status: DISCONTINUED | OUTPATIENT
Start: 2019-12-24 | End: 2019-12-26

## 2019-12-24 RX ADMIN — Medication 1 MILLIGRAM(S): at 12:24

## 2019-12-24 RX ADMIN — Medication 1 SPRAY(S): at 12:25

## 2019-12-24 RX ADMIN — ATORVASTATIN CALCIUM 40 MILLIGRAM(S): 80 TABLET, FILM COATED ORAL at 21:23

## 2019-12-24 RX ADMIN — Medication 100 MILLIGRAM(S): at 12:25

## 2019-12-24 RX ADMIN — Medication 3 MILLIGRAM(S): at 21:23

## 2019-12-24 RX ADMIN — Medication 200 MILLIGRAM(S): at 21:22

## 2019-12-24 RX ADMIN — Medication 50 MILLIGRAM(S): at 19:07

## 2019-12-24 RX ADMIN — SODIUM CHLORIDE 1 GRAM(S): 9 INJECTION INTRAMUSCULAR; INTRAVENOUS; SUBCUTANEOUS at 05:18

## 2019-12-24 RX ADMIN — PANTOPRAZOLE SODIUM 40 MILLIGRAM(S): 20 TABLET, DELAYED RELEASE ORAL at 05:17

## 2019-12-24 RX ADMIN — Medication 81 MILLIGRAM(S): at 12:25

## 2019-12-24 RX ADMIN — Medication 50 MILLIGRAM(S): at 05:18

## 2019-12-24 RX ADMIN — SODIUM CHLORIDE 3 MILLILITER(S): 9 INJECTION INTRAMUSCULAR; INTRAVENOUS; SUBCUTANEOUS at 14:00

## 2019-12-24 RX ADMIN — SODIUM CHLORIDE 3 MILLILITER(S): 9 INJECTION INTRAMUSCULAR; INTRAVENOUS; SUBCUTANEOUS at 05:17

## 2019-12-24 RX ADMIN — GABAPENTIN 100 MILLIGRAM(S): 400 CAPSULE ORAL at 12:24

## 2019-12-24 RX ADMIN — Medication 1 SPRAY(S): at 05:17

## 2019-12-24 RX ADMIN — CLOPIDOGREL BISULFATE 75 MILLIGRAM(S): 75 TABLET, FILM COATED ORAL at 12:24

## 2019-12-24 RX ADMIN — SODIUM CHLORIDE 3 MILLILITER(S): 9 INJECTION INTRAMUSCULAR; INTRAVENOUS; SUBCUTANEOUS at 22:00

## 2019-12-24 NOTE — CONSULT NOTE ADULT - SUBJECTIVE AND OBJECTIVE BOX
Patient is a 74y old  Female who presents with a chief complaint of Chest pain (24 Dec 2019 10:38)      HPI:  73 y/o F with PMH of HTN, CAD (s/p cath with 1 stent to mRCA 9 days ago at [facility]), Left ventricular outflow obstruction, spinal stenosis presents to the ED complaining of chest pain. Patient states that for the last 3 months she has been having exertional chest pressure. Patient states that pain is midsternal. Patient states 9 days ago she underwent cardiac cath where 1 stent was placed in the mRCA. Patient states that she was discharged and that pain restarted 1 day after discharge. Patient states that pain is worse with eating, exertion and laying down and sometimes radiates to left arm. She is unable to describe whether it is sharp or dull but states that it is intermittent. Patient also endorses dyspnea on exertion when walking. Patient states that she is compliant with her medications at home. Patient denies fever, chills, palpitations. (20 Dec 2019 21:44)     discussed with pt : with telephone  along with cardiology who were interviewing her     Sheh asn o asthma or copd: she never smoked  ?FOLLOWING PRESENT  [ x] Hx of PE/DVT, [ x] Hx COPD, x[ ] Hx of Asthma, [y ] Hx of Hospitalization, [ x]  Hx of BiPAP/CPAP use, [ x] Hx of SEMAJ    Allergies    sulfa drugs (Swelling)    Intolerances        PAST MEDICAL & SURGICAL HISTORY:  Left ventricular outflow tract obstruction  Spinal stenosis  HTN (hypertension)  History of back surgery  Status post LASIK surgery of both eyes      FAMILY HISTORY:  Family history of coronary artery disease in brother: Brother passed away from CAD at age 60      Social History: [ x ] TOBACCO                  [  ]x ETOH                                 [  x] IVDA/DRUGS    REVIEW OF SYSTEMS      General:	x    Skin/Breast:x  	  Ophthalmologic:x  	  ENMT:	x    Respiratory and Thorax: chest pain, mild MORENO   	  Cardiovascular:	x    Gastrointestinal:	x    Genitourinary:	x    Musculoskeletal:	x    Neurological:	x    Psychiatric:	x    Hematology/Lymphatics:	x    Endocrine:	x    Allergic/Immunologic:	x    MEDICATIONS  (STANDING):  aspirin enteric coated 81 milliGRAM(s) Oral daily  atorvastatin 40 milliGRAM(s) Oral at bedtime  clopidogrel Tablet 75 milliGRAM(s) Oral daily  Cyanocobalamin 100 mcg Tablet 100 MICROGram(s) 1 Tablet(s) Oral daily  folic acid 1 milliGRAM(s) Oral daily  gabapentin 100 milliGRAM(s) Oral daily  melatonin 3 milliGRAM(s) Oral at bedtime  metoprolol succinate ER 50 milliGRAM(s) Oral daily  pantoprazole    Tablet 40 milliGRAM(s) Oral before breakfast  pyridoxine 100 milliGRAM(s) Oral daily  sodium chloride 1 Gram(s) Oral two times a day  sodium chloride 0.65% Nasal 1 Spray(s) Both Nostrils four times a day  sodium chloride 0.9% lock flush 3 milliLiter(s) IV Push every 8 hours    MEDICATIONS  (PRN):  guaiFENesin   Syrup  (Sugar-Free) 100 milliGRAM(s) Oral every 6 hours PRN Cough       Vital Signs Last 24 Hrs  T(C): 36.6 (24 Dec 2019 05:16), Max: 36.9 (23 Dec 2019 16:38)  T(F): 97.9 (24 Dec 2019 05:16), Max: 98.5 (23 Dec 2019 16:38)  HR: 69 (24 Dec 2019 05:16) (63 - 73)  BP: 116/69 (24 Dec 2019 05:16) (116/69 - 154/58)  BP(mean): --  RR: 16 (24 Dec 2019 05:16) (16 - 16)  SpO2: 100% (24 Dec 2019 05:16) (99% - 100%)        I&O's Summary      Physical Exam:   GENERAL: NAD, well-groomed, well-developed  HEENT: DEBBI/   Atraumatic, Normocephalic  ENMT: No tonsillar erythema, exudates, or enlargement; Moist mucous membranes, Good dentition, No lesions  NECK: Supple, No JVD, Normal thyroid  CHEST/LUNG: Clear to auscultation bilaterally; No rales, rhonchi, wheezing, or rubs  CVS: Regular rate and rhythm; No murmurs, rubs, or gallops  GI: : Soft, Nontender, Nondistended; Bowel sounds present  NERVOUS SYSTEM:  Alert & Oriented X3  EXTREMITIES:  2+ Peripheral Pulses, No clubbing, cyanosis, or edema  LYMPH: No lymphadenopathy noted  SKIN: No rashes or lesions  ENDOCRINOLOGY: No Thyromegaly  PSYCH: Appropriate    Labs:                              12.0   7.29  )-----------( 140      ( 24 Dec 2019 06:58 )             35.3                         11.7   7.70  )-----------( 144      ( 23 Dec 2019 05:20 )             34.9                         11.8   7.01  )-----------( 140      ( 22 Dec 2019 05:30 )             33.9                         12.3   7.82  )-----------( 155      ( 21 Dec 2019 06:00 )             36.4                         12.7   9.04  )-----------( 169      ( 20 Dec 2019 15:10 )             37.4     12-24    135  |  102  |  11  ----------------------------<  125<H>  4.1   |  21<L>  |  0.72  12-23    133<L>  |  100  |  11  ----------------------------<  136<H>  4.1   |  22  |  0.64  12-22    130<L>  |  99  |  13  ----------------------------<  123<H>  3.9   |  21<L>  |  0.63  12-21    130<L>  |  96<L>  |  12  ----------------------------<  123<H>  3.4<L>   |  21<L>  |  0.64  12-20    132<L>  |  94<L>  |  16  ----------------------------<  134<H>  3.5   |  24  |  0.70    Ca    8.9      24 Dec 2019 06:58  Ca    8.8      23 Dec 2019 05:20  Phos  2.9     12-23  Mg     2.3     12-24  Mg     2.4     12-23    TPro  7.9  /  Alb  4.2  /  TBili  0.3  /  DBili  x   /  AST  31  /  ALT  26  /  AlkPhos  92  12-20    CAPILLARY BLOOD GLUCOSE        < from: Xray Chest 2 Views PA/Lat (12.20.19 @ 16:40) >    EXAM:  XR CHEST PA LAT 2V        PROCEDURE DATE:  Dec 20 2019         INTERPRETATION:  CLINICAL INFORMATION: Chest pain and shortness of breath    COMPARISON:  CTA chest from 4/12/2019    TECHNIQUE:   PA and lateral chest radiographs    FINDINGS:     The heart is normal in size. Clear lungs. No pleural effusions or pneumothorax.      IMPRESSION:  Clear lungs        < from: CT Angio Chest w/ IV Cont (04.12.19 @ 19:40) >    LUNGS AND LARGE AIRWAYS: Patent central airways.  Diffuse bilateral   patchy groundglass opacities.    PLEURA: No pleural effusion.    VESSELS: Limited evaluation due to respiratory motion artifact. No main,   right, left, lobar pulmonary embolism. Limited evaluation of subsegmental   pulmonary arteries due to motion artifact.    HEART: Mild Cardiomegaly No pericardial effusion.    MEDIASTINUM AND LYUDMILA: No lymphadenopathy.    CHEST WALL AND LOWER NECK: Within normal limits.    VISUALIZED UPPER ABDOMEN: Within normal limits.    BONES: Degenerative changes.    IMPRESSION:     No main, right, left, lobar pulmonary embolism. Limited evaluation of   subsegmental pulmonary arteries due to motion artifact.    Diffuse bilateral patchy groundglass opacities may represent infection   versus edema.                DENISSE KHAN M.D., RADIOLOGY RESIDENT  This document has been electronically signed.  JAQUELINE TEJADA M.D., ATTENDING RADIOLOGIST  This document has been electronically signed. Apr 12 2019  9:03PM    < end of copied text >        MARTIR SENA M.D., RADIOLOGY RESIDENT  This document has been electronically signed.  WHITLEY ALFONSO M.D., ATTENDING RADIOLOGIST  This document has been electronically signed. Dec 21 2019  9:34AM        < end of copied text >        D DImer      Studies  Chest X-RAY  CT SCAN Chest   CT Abdomen  Venous Dopplers: LE:   Others

## 2019-12-24 NOTE — PROGRESS NOTE ADULT - SUBJECTIVE AND OBJECTIVE BOX
Pt seen and examined at bedside  feels well.  denies any compls  No chest pain, dyspnea, dizziness  no n/v/d      Allergies:  sulfa drugs (Swelling)    Hospital Medications:   MEDICATIONS  (STANDING):  aspirin enteric coated 81 milliGRAM(s) Oral daily  atorvastatin 40 milliGRAM(s) Oral at bedtime  clopidogrel Tablet 75 milliGRAM(s) Oral daily  Cyanocobalamin 100 mcg Tablet 100 MICROGram(s) 1 Tablet(s) Oral daily  folic acid 1 milliGRAM(s) Oral daily  gabapentin 100 milliGRAM(s) Oral daily  melatonin 3 milliGRAM(s) Oral at bedtime  metoprolol succinate ER 50 milliGRAM(s) Oral daily  pantoprazole    Tablet 40 milliGRAM(s) Oral before breakfast  pyridoxine 100 milliGRAM(s) Oral daily  sodium chloride 1 Gram(s) Oral two times a day  sodium chloride 0.65% Nasal 1 Spray(s) Both Nostrils four times a day  sodium chloride 0.9% lock flush 3 milliLiter(s) IV Push every 8 hours    REVIEW OF SYSTEMS:     All other review of systems is negative unless indicated above.    VITALS:  T(F): 97.9 (19 @ 05:16), Max: 98.5 (19 @ 16:38)  HR: 69 (19 @ 05:16)  BP: 116/69 (19 @ 05:16)  RR: 16 (19 @ 05:16)  SpO2: 100% (19 @ 05:16)  Wt(kg): --      PHYSICAL EXAM:  Constitutional: NAD  HEENT: anicteric sclera, oropharynx clear, MMM  Neck: No JVD  Respiratory: CTAB, no wheezes, rales or rhonchi  Cardiovascular: S1, S2, RRR  Gastrointestinal: BS+, soft, NT/ND  Extremities: No cyanosis or clubbing. No peripheral edema  Neurological: A/O x 3, no focal deficits  Psychiatric: Normal mood, normal affect  : No CVA tenderness. No ortiz.   Skin: No rashes       LABS:      135  |  102  |  11  ----------------------------<  125<H>  4.1   |  21<L>  |  0.72    Ca    8.9      24 Dec 2019 06:58  Phos  2.9     12-  Mg     2.3     12-24      Creatinine Trend: 0.72 <--, 0.64 <--, 0.63 <--, 0.64 <--, 0.70 <--                        12.0   7.29  )-----------( 140      ( 24 Dec 2019 06:58 )             35.3     Urine Studies:  Urinalysis Basic - ( 22 Dec 2019 01:15 )    Color: LIGHT YELLOW / Appearance: CLEAR / S.012 / pH: 6.0  Gluc: NEGATIVE / Ketone: NEGATIVE  / Bili: NEGATIVE / Urobili: NORMAL   Blood: NEGATIVE / Protein: NEGATIVE / Nitrite: NEGATIVE   Leuk Esterase: MODERATE / RBC: 0-2 / WBC 11-25   Sq Epi: FEW / Non Sq Epi:  / Bacteria: MODERATE      Osmolality, Random Urine: 292 mosmo/kg ( @ 13:00)    RADIOLOGY & ADDITIONAL STUDIES:

## 2019-12-24 NOTE — CONSULT NOTE ADULT - ASSESSMENT
75 y/o F with PMH of HTN, CAD (s/p cath with 1 stent to mRCA 9 days ago at [facility]), Left ventricular outflow obstruction, spinal stenosis presents to the ED complaining of chest pain found to have hyponatremia
75 y/o F with PMH of HTN, CAD (s/p cath with 1 stent to mRCA 1 week ago at [facility]), Left ventricular outflow obstruction, spinal stenosis presents to the ED complaining of chest pain. Case discussed with Dr. Arroyo.
73 y/o [place] female with PMHX of HTN, HLD, spinal stenosis s/p sx, CAD s/p stent to prox RCA on 12/11 comes in with complaints of chest pressure. She states it was the same pain she felt when she presented initially to [facility] on 12/11, but the pain did not resolve even s/p stent. Pain is worse when she eats, and also when she walks up a flight of stairs. Also admits to palpitations and feeling of anxiety. Denies SOB, syncope. 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 Also her brother diet at age 65, was told he had a heart murmur. Non smoker, no ETOH or drug abuse. HF consulted on 12/24 for TTE shows LVEF 65-70%, mod MR, severe dynamic LVOT.

## 2019-12-24 NOTE — PROGRESS NOTE ADULT - SUBJECTIVE AND OBJECTIVE BOX
Pio Serna MD  Interventional Cardiology / Advance Heart Failure and Cardiac Transplant Specialist  [place] Office : [address] [place] [address] [place] [address]  Tel:   [place] Office : [address] [facility] [place] [address]  Tel: [phone]  Cell : [phone]    73 y/o F with PMH of HTN, CAD (s/p cath with 1 stent to mRCA 9 days ago at [facility]), Left ventricular outflow obstruction, spinal stenosis presents to the ED complaining of chest pain. Patient states that for the last 3 months she has been having exertional chest pressure. Patient states that pain is midsternal. Patient states 9 days ago she underwent cardiac cath where 1 stent was placed in the mRCA. pt s/p cath yesterday , showed patent stents   	  MEDICATIONS:  aspirin enteric coated 81 milliGRAM(s) Oral daily  clopidogrel Tablet 75 milliGRAM(s) Oral daily  metoprolol succinate ER 50 milliGRAM(s) Oral daily      guaiFENesin   Syrup  (Sugar-Free) 100 milliGRAM(s) Oral every 6 hours PRN    gabapentin 100 milliGRAM(s) Oral daily  melatonin 3 milliGRAM(s) Oral at bedtime    pantoprazole    Tablet 40 milliGRAM(s) Oral before breakfast    atorvastatin 40 milliGRAM(s) Oral at bedtime    folic acid 1 milliGRAM(s) Oral daily  pyridoxine 100 milliGRAM(s) Oral daily  sodium chloride 0.65% Nasal 1 Spray(s) Both Nostrils four times a day  sodium chloride 0.9% lock flush 3 milliLiter(s) IV Push every 8 hours      PAST MEDICAL/SURGICAL HISTORY  PAST MEDICAL & SURGICAL HISTORY:  Left ventricular outflow tract obstruction  Spinal stenosis  HTN (hypertension)  History of back surgery  Status post LASIK surgery of both eyes      SOCIAL HISTORY: Substance Use (street drugs): ( x ) never used  (  ) other:    FAMILY HISTORY:  Family history of coronary artery disease in brother: Brother passed away from CAD at age 60        PHYSICAL EXAM:  T(C): 37.7 (12-24-19 @ 12:34), Max: 37.7 (12-24-19 @ 12:34)  HR: 67 (12-24-19 @ 12:34) (63 - 73)  BP: 125/68 (12-24-19 @ 12:34) (116/69 - 154/58)  RR: 16 (12-24-19 @ 12:34) (16 - 16)  SpO2: 95% (12-24-19 @ 12:34) (95% - 100%)  Wt(kg): --  I&O's Summary          EYES: EOMI, PERRLA, conjunctiva and sclera clear  ENMT: No tonsillar erythema, exudates, or enlargement; Moist mucous membranes, Good dentition, No lesions  Cardiovascular: Normal S1 S2, No JVD, 2/6 ejection systolic murmurs, No edema  Respiratory: Lungs clear to auscultation	  Gastrointestinal:  Soft, Non-tender, + BS	  Extremities: Normal range of motion, No clubbing, cyanosis or edema                                      12.0   7.29  )-----------( 140      ( 24 Dec 2019 06:58 )             35.3     12-24    135  |  102  |  11  ----------------------------<  125<H>  4.1   |  21<L>  |  0.72    Ca    8.9      24 Dec 2019 06:58  Phos  2.9     12-23  Mg     2.3     12-24      proBNP:   Lipid Profile:   HgA1c:   TSH:     Consultant(s) Notes Reviewed:  [x ] YES  [ ] NO    Care Discussed with Consultants/Other Providers [ x] YES  [ ] NO    Imaging Personally Reviewed independently:  [x] YES  [ ] NO    All labs, radiologic studies, vitals, orders and medications list reviewed. Patient is seen and examined at bedside. Case discussed with medical team.

## 2019-12-24 NOTE — CONSULT NOTE ADULT - PROBLEM SELECTOR RECOMMENDATION 2
likely has HOCM AS A REASON for his MORENO and chest pain :No underlying lung disease: no wheezing:  In April she had CT chest and was diagnosed with pneumonia: currently she has no clinical features of pneumonia and her chest radiograph is clear:

## 2019-12-24 NOTE — PROGRESS NOTE ADULT - SUBJECTIVE AND OBJECTIVE BOX
LAVERNE GUADALUPEGO:[id number],   74yFemale followed for:  sulfa drugs (Swelling)    PAST MEDICAL & SURGICAL HISTORY:  Left ventricular outflow tract obstruction  Spinal stenosis  HTN (hypertension)  History of back surgery  Status post LASIK surgery of both eyes    FAMILY HISTORY:  Family history of coronary artery disease in brother: Brother passed away from CAD at age 60    MEDICATIONS  (STANDING):  aspirin enteric coated 81 milliGRAM(s) Oral daily  atorvastatin 40 milliGRAM(s) Oral at bedtime  clopidogrel Tablet 75 milliGRAM(s) Oral daily  Cyanocobalamin 100 mcg Tablet 100 MICROGram(s) 1 Tablet(s) Oral daily  folic acid 1 milliGRAM(s) Oral daily  gabapentin 100 milliGRAM(s) Oral daily  melatonin 3 milliGRAM(s) Oral at bedtime  metoprolol succinate ER 50 milliGRAM(s) Oral daily  pantoprazole    Tablet 40 milliGRAM(s) Oral before breakfast  pyridoxine 100 milliGRAM(s) Oral daily  sodium chloride 1 Gram(s) Oral two times a day  sodium chloride 0.65% Nasal 1 Spray(s) Both Nostrils four times a day  sodium chloride 0.9% lock flush 3 milliLiter(s) IV Push every 8 hours    MEDICATIONS  (PRN):      Vital Signs Last 24 Hrs  T(C): 36.6 (24 Dec 2019 05:16), Max: 36.9 (23 Dec 2019 16:38)  T(F): 97.9 (24 Dec 2019 05:16), Max: 98.5 (23 Dec 2019 16:38)  HR: 69 (24 Dec 2019 05:16) (55 - 73)  BP: 116/69 (24 Dec 2019 05:16) (116/69 - 154/58)  BP(mean): --  RR: 16 (24 Dec 2019 05:16) (16 - 17)  SpO2: 100% (24 Dec 2019 05:16) (94% - 100%)  nc/at  s1s2  cta  soft, nt, nd no guarding or rebound  no c/c/e    CBC Full  -  ( 23 Dec 2019 05:20 )  WBC Count : 7.70 K/uL  RBC Count : 3.61 M/uL  Hemoglobin : 11.7 g/dL  Hematocrit : 34.9 %  Platelet Count - Automated : 144 K/uL  Mean Cell Volume : 96.7 fL  Mean Cell Hemoglobin : 32.4 pg  Mean Cell Hemoglobin Concentration : 33.5 %  Auto Neutrophil # : x  Auto Lymphocyte # : x  Auto Monocyte # : x  Auto Eosinophil # : x  Auto Basophil # : x  Auto Neutrophil % : x  Auto Lymphocyte % : x  Auto Monocyte % : x  Auto Eosinophil % : x  Auto Basophil % : x    12-23    133<L>  |  100  |  11  ----------------------------<  136<H>  4.1   |  22  |  0.64    Ca    8.8      23 Dec 2019 05:20  Phos  2.9     12-23  Mg     2.4     12-23

## 2019-12-24 NOTE — PROGRESS NOTE ADULT - SUBJECTIVE AND OBJECTIVE BOX
[facility]    Subjective: Patient seen and examined. No new events except as noted.   Echo noted  HF eval appreciated    REVIEW OF SYSTEMS:    CONSTITUTIONAL: No weakness, fevers or chills  EYES/ENT: No visual changes;  No vertigo or throat pain   NECK: No pain or stiffness  RESPIRATORY: No cough, wheezing, hemoptysis; No shortness of breath  CARDIOVASCULAR: No chest pain or palpitations  GASTROINTESTINAL: No abdominal or epigastric pain. No nausea, vomiting, or hematemesis; No diarrhea or constipation. No melena or hematochezia.  GENITOURINARY: No dysuria, frequency or hematuria  NEUROLOGICAL: No numbness or weakness  SKIN: No itching, burning, rashes, or lesions   All other review of systems is negative unless indicated above.    MEDICATIONS:  MEDICATIONS  (STANDING):  aspirin enteric coated 81 milliGRAM(s) Oral daily  atorvastatin 40 milliGRAM(s) Oral at bedtime  clopidogrel Tablet 75 milliGRAM(s) Oral daily  Cyanocobalamin 100 mcg Tablet 100 MICROGram(s) 1 Tablet(s) Oral daily  disopyramide 200 milliGRAM(s) Oral every 12 hours  folic acid 1 milliGRAM(s) Oral daily  gabapentin 100 milliGRAM(s) Oral daily  melatonin 3 milliGRAM(s) Oral at bedtime  metoprolol tartrate 50 milliGRAM(s) Oral two times a day  pantoprazole    Tablet 40 milliGRAM(s) Oral before breakfast  pyridoxine 100 milliGRAM(s) Oral daily  sodium chloride 0.65% Nasal 1 Spray(s) Both Nostrils four times a day  sodium chloride 0.9% lock flush 3 milliLiter(s) IV Push every 8 hours      PHYSICAL EXAM:  T(C): 37.7 (12-24-19 @ 12:34), Max: 37.7 (12-24-19 @ 12:34)  HR: 67 (12-24-19 @ 12:34) (67 - 73)  BP: 125/68 (12-24-19 @ 12:34) (116/69 - 142/90)  RR: 16 (12-24-19 @ 12:34) (16 - 16)  SpO2: 95% (12-24-19 @ 12:34) (95% - 100%)  Wt(kg): --  I&O's Summary        Appearance: Normal	  HEENT:   Normal oral mucosa, PERRL, EOMI	  Lymphatic: No lymphadenopathy , no edema  Cardiovascular: Normal S1 S2, No JVD, No murmurs , Peripheral pulses palpable 2+ bilaterally  Respiratory: Lungs clear to auscultation, normal effort 	  Gastrointestinal:  Soft, Non-tender, + BS	  Skin: No rashes, No ecchymoses, No cyanosis, warm to touch  Musculoskeletal: Normal range of motion, normal strength  Psychiatry:  Mood & affect appropriate  Ext: No edema      All labs, Imaging and EKGs personally reviewed                             12.0   7.29  )-----------( 140      ( 24 Dec 2019 06:58 )             35.3               12-24    135  |  102  |  11  ----------------------------<  125<H>  4.1   |  21<L>  |  0.72    Ca    8.9      24 Dec 2019 06:58  Phos  2.9     12-23  Mg     2.3     12-24          < from: Transthoracic Echocardiogram (12.23.19 @ 18:15) >  CONCLUSIONS:  1. Normal mitral valve. Systolic anterior motion of the  mitral valve. Moderate mitral regurgitation.  2. Mild concentric left ventricular hypertrophy. There is a  prominent basal septum (1.4 cm). The mid septum (1.0 cm).  3. Hyperdynamic left ventricle. There is evidence for  dynamic left ventricular outflow obstruction. Peak left  ventricular outflow tract gradient equals 231 mm Hg, mean  gradient is equal to 89 mm Hg, LVOT velocity time integral  equals 166 cm, consistent with severe LVOT obstruction.  4. Normal right ventricular size and function.  5. Estimated right ventricular systolic pressure equals 46  mm Hg, assuming right atrial pressure equals 10 mm Hg,  consistent with mild pulmonary hypertension.  *** Compared with echocardiogram of 4/15/2019, severe  dynamic LVOT obstruction is now seen. Mild pulmonary HTN is  now seen.

## 2019-12-24 NOTE — CONSULT NOTE ADULT - ATTENDING COMMENTS
My overall assessment is that this a 75 YO F with a history of CAD s/p SALAZAR to RCA 12/11/19 and HTN who was admitted with chest pain, describing NYHA III symptoms of CP/dyspnea on exertion. On exam she has a marked systolic murmur worsened by provacation and TTE reveals asymmetric basal septal hypertrophy (1.4-1.5 cm) with KOMAL and moderate MR along with a peak LVOT gradient of 231 mmHg at rest. This is consistent with a diagnosis of hypertrophic obstructive cardiomyopathy.    Will attempt to optimize medications by augmenting beta blocker though HR is borderline and she will unlikely be able to tolerate enough AVN blockade to reduce her severe obstruction. She has a QTc of 472 ms that has been as high as 508 ms this month so she would not be an appropriate candidate for disopyramide. Therefore, septal reduction therapy would likely be indicated.    The plan for today is as follows:  - switch toprol 50 to metoprolol tartrate 50 mg BID  - deferring disopyramide due to prolonged QTc  - cardiac MRI to better quantify septal thickness and assess for scar  - will discuss surgical myectomy with cardiac surgery, particularly if septum appears to be > 1.5 cm on MRI    Please feel free to call me with any questions: [phone]

## 2019-12-24 NOTE — CONSULT NOTE ADULT - SUBJECTIVE AND OBJECTIVE BOX
Date of Admission:    CHIEF COMPLAINT:    HISTORY OF PRESENT ILLNESS:      Allergies    sulfa drugs (Swelling)    Intolerances    	    MEDICATIONS:  aspirin enteric coated 81 milliGRAM(s) Oral daily  clopidogrel Tablet 75 milliGRAM(s) Oral daily  metoprolol succinate ER 50 milliGRAM(s) Oral daily        gabapentin 100 milliGRAM(s) Oral daily  melatonin 3 milliGRAM(s) Oral at bedtime    pantoprazole    Tablet 40 milliGRAM(s) Oral before breakfast    atorvastatin 40 milliGRAM(s) Oral at bedtime    folic acid 1 milliGRAM(s) Oral daily  pyridoxine 100 milliGRAM(s) Oral daily  sodium chloride 1 Gram(s) Oral two times a day  sodium chloride 0.65% Nasal 1 Spray(s) Both Nostrils four times a day  sodium chloride 0.9% lock flush 3 milliLiter(s) IV Push every 8 hours      PAST MEDICAL & SURGICAL HISTORY:  Left ventricular outflow tract obstruction  Spinal stenosis  HTN (hypertension)  History of back surgery  Status post LASIK surgery of both eyes      FAMILY HISTORY:  Family history of coronary artery disease in brother: Brother passed away from CAD at age 60      SOCIAL HISTORY:    [ ] Non-smoker  [ ] Smoker  [ ] Alcohol      REVIEW OF SYSTEMS:  CONSTITUTIONAL: No fever, weight loss, or fatigue  EYES: No eye pain, visual disturbances, or discharge  ENMT:  No difficulty hearing, tinnitus, vertigo; No sinus or throat pain  NECK: No pain or stiffness  RESPIRATORY: No cough, wheezing, chills or hemoptysis; No Shortness of Breath  CARDIOVASCULAR: No chest pain, palpitations, passing out, dizziness, or leg swelling  GASTROINTESTINAL: No abdominal or epigastric pain. No nausea, vomiting, or hematemesis; No diarrhea or constipation. No melena or hematochezia.  GENITOURINARY: No dysuria, frequency, hematuria, or incontinence  NEUROLOGICAL: No headaches, memory loss, loss of strength, numbness, or tremors  SKIN: No itching, burning, rashes, or lesions   LYMPH Nodes: No enlarged glands  ENDOCRINE: No heat or cold intolerance; No hair loss  MUSCULOSKELETAL: No joint pain or swelling; No muscle, back, or extremity pain  PSYCHIATRIC: No depression, anxiety, mood swings, or difficulty sleeping  HEME/LYMPH: No easy bruising, or bleeding gums  ALLERY AND IMMUNOLOGIC: No hives or eczema	    [ ] All others negative	  [ ] Unable to obtain    PHYSICAL EXAM:  T(C): 36.6 (12-24-19 @ 05:16), Max: 36.9 (19 @ 16:38)  HR: 69 (19 @ 05:16) (55 - 73)  BP: 116/69 (19 @ 05:16) (116/69 - 154/58)  RR: 16 (19 @ 05:16) (16 - 17)  SpO2: 100% (19 @ 05:16) (94% - 100%)  Wt(kg): --  I&O's Summary      Appearance: Normal	  HEENT:   Normal oral mucosa, PERRL, EOMI	  Lymphatic: No lymphadenopathy  Cardiovascular: Normal S1 S2, No JVD, No murmurs, No edema  Respiratory: Lungs clear to auscultation	  Psychiatry: A & O x 3, Mood & affect appropriate  Gastrointestinal:  Soft, Non-tender, + BS	  Skin: No rashes, No ecchymoses, No cyanosis	  Neurologic: Non-focal  Extremities: Normal range of motion, No clubbing, cyanosis or edema  Vascular: Peripheral pulses palpable 2+ bilaterally        LABS:	 	    CBC Full  -  ( 24 Dec 2019 06:58 )  WBC Count : 7.29 K/uL  Hemoglobin : 12.0 g/dL  Hematocrit : 35.3 %  Platelet Count - Automated : 140 K/uL  Mean Cell Volume : 95.1 fL  Mean Cell Hemoglobin : 32.3 pg  Mean Cell Hemoglobin Concentration : 34.0 %  Auto Neutrophil # : x  Auto Lymphocyte # : x  Auto Monocyte # : x  Auto Eosinophil # : x  Auto Basophil # : x  Auto Neutrophil % : x  Auto Lymphocyte % : x  Auto Monocyte % : x  Auto Eosinophil % : x  Auto Basophil % : x        135  |  102  |  11  ----------------------------<  125<H>  4.1   |  21<L>  |  0.72      133<L>  |  100  |  11  ----------------------------<  136<H>  4.1   |  22  |  0.64    Ca    8.9      24 Dec 2019 06:58  Ca    8.8      23 Dec 2019 05:20  Phos  2.9       Mg     2.3     12-24  Mg     2.4           < from: Transthoracic Echocardiogram (19 @ 18:15) >  DIMENSIONS:  Dimensions:     Normal Values:  LA:     4.2 cm    2.0 - 4.0 cm  Ao:     2.7 cm    2.0 - 3.8 cm  SEPTUM: 1.4 cm    0.6 - 1.2 cm  PWT:    1.0 cm    0.6 - 1.1 cm  LVIDd:  4.5 cm    3.0 - 5.6 cm  LVIDs:  3.1 cm    1.8 - 4.0 cm  Derived Variables:  LVMI: 117 g/m2  RWT: 0.44  Fractional short: 31 %  Ejection Fraction (Visual Estimate): 65-70 %  Peak Velocity (m/sec): TV=3.0  ------------------------------------------------------------------------  OBSERVATIONS:  Mitral Valve: Normal mitral valve. Systolic anterior motion  of the mitral valve. Moderate mitral regurgitation.  Aortic Root: Normal aortic root.  Aortic Valve: Calcified trileaflet aortic valve with normal  opening. Unable to accurately evaluate aortic stenosis in  the setting of severe LVOT obstruction.  Mild aortic  regurgitation.  There is evidence for dynamic left  ventricular outflow obstruction. Peak left ventricular  outflow tract gradient equals 231 mm Hg, mean gradient is  equal to 89 mm Hg, LVOT velocity time integral equals 166  cm, consistent with severe LVOT obstruction.  Left Atrium: Moderately dilated left atrium.  LA volume  index = 48 cc/m2.  Left Ventricle: Hyperdynamic left ventricle. Mild  concentric left ventricular hypertrophy. There is a  prominent basal septum (1.4 cm). The mid septum (1.0 cm).  Mild diastolic dysfunction (Stage I).  Right Heart: Normal right atrium. Normal right ventricular  size and function. Normal tricuspid valve. Mild tricuspid  regurgitation. Pulmonic valve not well visualized.  Mild  pulmonic regurgitation.  Pericardium/PleuraNormal pericardium with no pericardial  effusion.  Hemodynamic: Estimated right ventricular systolic pressure  equals 46 mm Hg, assuming right atrial pressure equals 10  mm Hg, consistent with mild pulmonary hypertension.  ------------------------------------------------------------------------  CONCLUSIONS:  1. Normal mitral valve. Systolic anterior motion of the  mitral valve. Moderate mitral regurgitation.  2. Mild concentric left ventricular hypertrophy. There is a  prominent basal septum (1.4 cm). The mid septum (1.0 cm).  3. Hyperdynamic left ventricle. There is evidence for  dynamic left ventricular outflow obstruction. Peak left  ventricular outflow tract gradient equals 231 mm Hg, mean  gradient is equal to 89 mm Hg, LVOT velocity time integral  equals 166 cm, consistent with severe LVOT obstruction.  4. Normal right ventricular size and function.  5. Estimated right ventricular systolic pressure equals 46  mm Hg, assuming right atrial pressure equals 10 mm Hg,  consistent with mild pulmonary hypertension.  *** Compared with echocardiogram of 4/15/2019, severe  dynamic LVOT obstruction is now seen. Mild pulmonary HTN is  now seen.  ------------------------------------------------------------------------  Confirmed on  2019 - 20:05:57 by Mick Rothman M.D.  ------------------------------------------------------------------------    < end of copied text >    < from: Cardiac Cath Lab - Adult (19 @ 13:59) >  ml, IV.  VENTRICLES: No left ventriculogram was performed.  CORONARY VESSELS: The coronary circulation is right dominant.  LM:   --  LM: Normal.  LAD:   --  LAD: Normal.  CX:   --  Ostial circumflex: There was a discrete 30 % stenosis.  RCA:   --  Proximal RCA: There was a 0 % stenosis at the site of a prior  stent.  COMPLICATIONS: No complications occurred during the cath lab visit.  DIAGNOSTIC RECOMMENDATIONS: Medical management is recommended.  Prepared and signed by  Morgan Santoyo M.D.  Signed 2019 14:36:32  HEMODYNAMIC TABLES  Pressures:  NO PHASE  Pressures:  - HR: 72  Pressures:  - Rhythm:  Pressures:  -- Aortic Pressure (S/D/M): 141/68/101  Outputs:  NO PHASE  Outputs:  -- CALCULATIONS[age]  Outputs:  -- CALCULATIONS: Body Surface Area: 1.70  Outputs:  -- CALCULATIONS: Height in cm: 150.00  Outputs:  -- CALCULATIONS: Sex: Female  Outputs:  -- CALCULATIONS: Weight in k.80  Outputs:  -- OUTPUTS: O2 consumption: 212.50  Outputs:  -- OUTPUTS: Vo2 Indexed: 125.00    < end of copied text >      < from: Cardiac Cath Lab - Adult (19 @ 15:18) >  LM:   --  LM: Angiography showed minor luminal irregularities with no flow  limiting lesions.  LAD:   --  LAD: Angiography showed minor luminal irregularities with no  flow limiting lesions.  CX:   --  Circumflex: Angiography showed minor luminal irregularities with  no flow limiting lesions.  RCA:   --  Proximal RCA: There was a 70 % stenosis.  COMPLICATIONS: There were no complications.  DIAGNOSTIC RECOMMENDATIONS: IFR was 0.78 in the RCA  INTERVENTIONAL RECOMMENDATIONS: IFR was 0.78 in the RCA  Prepared and signed by  Jerry Allen M.D.  Signed 2019 16:45:27  HEMODYNAMIC TABLES  Pressures:  Baseline  Pressures:  - HR: 71  Pressures:  - Rhythm:  Pressures:  -- Aortic Pressure (S/D/M): 174/76/115  Pressures:  Intervention  Pressures:  - HR: 75  Pressures:  - Rhythm:  Pressures:  -- Aortic Pressure (S/D/M): 176/79/119  Outputs:  Baseline  Outputs:  -- CALCULATIONS: [age]  Outputs:  -- CALCULATIONS: Body Surface Area: 1.76  Outputs:  -- CALCULATIONS: Height in cm: 150.00  Outputs:  -- CALCULATIONS: Sex: Female  Outputs:  -- CALCULATIONS: Weight in k.60    < end of copied text >    < from: Xray Chest 2 Views PA/Lat (19 @ 16:40) >    TECHNIQUE:   PA and lateral chest radiographs    FINDINGS:     The heart is normal in size. Clear lungs. No pleural effusions or pneumothorax.      IMPRESSION:  Clear lungs    < end of copied text > Date of Admission: 19    CHIEF COMPLAINT: chest pressure     HISTORY OF PRESENT ILLNESS:      74    Allergies    sulfa drugs (Swelling)    Intolerances    	    MEDICATIONS:  aspirin enteric coated 81 milliGRAM(s) Oral daily  clopidogrel Tablet 75 milliGRAM(s) Oral daily  metoprolol succinate ER 50 milliGRAM(s) Oral daily        gabapentin 100 milliGRAM(s) Oral daily  melatonin 3 milliGRAM(s) Oral at bedtime    pantoprazole    Tablet 40 milliGRAM(s) Oral before breakfast    atorvastatin 40 milliGRAM(s) Oral at bedtime    folic acid 1 milliGRAM(s) Oral daily  pyridoxine 100 milliGRAM(s) Oral daily  sodium chloride 1 Gram(s) Oral two times a day  sodium chloride 0.65% Nasal 1 Spray(s) Both Nostrils four times a day  sodium chloride 0.9% lock flush 3 milliLiter(s) IV Push every 8 hours      PAST MEDICAL & SURGICAL HISTORY:  Left ventricular outflow tract obstruction  Spinal stenosis  HTN (hypertension)  History of back surgery  Status post LASIK surgery of both eyes      FAMILY HISTORY:  Family history of coronary artery disease in brother: Brother passed away from CAD at age 60      SOCIAL HISTORY:    [ ] Non-smoker  [ ] Smoker  [ ] Alcohol      REVIEW OF SYSTEMS:  CONSTITUTIONAL: No fever, weight loss, or fatigue  EYES: No eye pain, visual disturbances, or discharge  ENMT:  No difficulty hearing, tinnitus, vertigo; No sinus or throat pain  NECK: No pain or stiffness  RESPIRATORY: No cough, wheezing, chills or hemoptysis; No Shortness of Breath  CARDIOVASCULAR: No chest pain, palpitations, passing out, dizziness, or leg swelling  GASTROINTESTINAL: No abdominal or epigastric pain. No nausea, vomiting, or hematemesis; No diarrhea or constipation. No melena or hematochezia.  GENITOURINARY: No dysuria, frequency, hematuria, or incontinence  NEUROLOGICAL: No headaches, memory loss, loss of strength, numbness, or tremors  SKIN: No itching, burning, rashes, or lesions   LYMPH Nodes: No enlarged glands  ENDOCRINE: No heat or cold intolerance; No hair loss  MUSCULOSKELETAL: No joint pain or swelling; No muscle, back, or extremity pain  PSYCHIATRIC: No depression, anxiety, mood swings, or difficulty sleeping  HEME/LYMPH: No easy bruising, or bleeding gums  ALLERY AND IMMUNOLOGIC: No hives or eczema	    [ ] All others negative	  [ ] Unable to obtain    PHYSICAL EXAM:  T(C): 36.6 (19 @ 05:16), Max: 36.9 (19 @ 16:38)  HR: 69 (19 @ 05:16) (55 - 73)  BP: 116/69 (19 @ 05:16) (116/69 - 154/58)  RR: 16 (19 @ 05:16) (16 - 17)  SpO2: 100% (19 @ 05:16) (94% - 100%)  Wt(kg): --  I&O's Summary      Appearance: Normal	  HEENT:   Normal oral mucosa, PERRL, EOMI	  Lymphatic: No lymphadenopathy  Cardiovascular: Normal S1 S2, No JVD, No murmurs, No edema  Respiratory: Lungs clear to auscultation	  Psychiatry: A & O x 3, Mood & affect appropriate  Gastrointestinal:  Soft, Non-tender, + BS	  Skin: No rashes, No ecchymoses, No cyanosis	  Neurologic: Non-focal  Extremities: Normal range of motion, No clubbing, cyanosis or edema  Vascular: Peripheral pulses palpable 2+ bilaterally        LABS:	 	    CBC Full  -  ( 24 Dec 2019 06:58 )  WBC Count : 7.29 K/uL  Hemoglobin : 12.0 g/dL  Hematocrit : 35.3 %  Platelet Count - Automated : 140 K/uL  Mean Cell Volume : 95.1 fL  Mean Cell Hemoglobin : 32.3 pg  Mean Cell Hemoglobin Concentration : 34.0 %  Auto Neutrophil # : x  Auto Lymphocyte # : x  Auto Monocyte # : x  Auto Eosinophil # : x  Auto Basophil # : x  Auto Neutrophil % : x  Auto Lymphocyte % : x  Auto Monocyte % : x  Auto Eosinophil % : x  Auto Basophil % : x        135  |  102  |  11  ----------------------------<  125<H>  4.1   |  21<L>  |  0.72      133<L>  |  100  |  11  ----------------------------<  136<H>  4.1   |  22  |  0.64    Ca    8.9      24 Dec 2019 06:58  Ca    8.8      23 Dec 2019 05:20  Phos  2.9       Mg     2.3       Mg     2.4           < from: Transthoracic Echocardiogram (19 @ 18:15) >  DIMENSIONS:  Dimensions:     Normal Values:  LA:     4.2 cm    2.0 - 4.0 cm  Ao:     2.7 cm    2.0 - 3.8 cm  SEPTUM: 1.4 cm    0.6 - 1.2 cm  PWT:    1.0 cm    0.6 - 1.1 cm  LVIDd:  4.5 cm    3.0 - 5.6 cm  LVIDs:  3.1 cm    1.8 - 4.0 cm  Derived Variables:  LVMI: 117 g/m2  RWT: 0.44  Fractional short: 31 %  Ejection Fraction (Visual Estimate): 65-70 %  Peak Velocity (m/sec): TV=3.0  ------------------------------------------------------------------------  OBSERVATIONS:  Mitral Valve: Normal mitral valve. Systolic anterior motion  of the mitral valve. Moderate mitral regurgitation.  Aortic Root: Normal aortic root.  Aortic Valve: Calcified trileaflet aortic valve with normal  opening. Unable to accurately evaluate aortic stenosis in  the setting of severe LVOT obstruction.  Mild aortic  regurgitation.  There is evidence for dynamic left  ventricular outflow obstruction. Peak left ventricular  outflow tract gradient equals 231 mm Hg, mean gradient is  equal to 89 mm Hg, LVOT velocity time integral equals 166  cm, consistent with severe LVOT obstruction.  Left Atrium: Moderately dilated left atrium.  LA volume  index = 48 cc/m2.  Left Ventricle: Hyperdynamic left ventricle. Mild  concentric left ventricular hypertrophy. There is a  prominent basal septum (1.4 cm). The mid septum (1.0 cm).  Mild diastolic dysfunction (Stage I).  Right Heart: Normal right atrium. Normal right ventricular  size and function. Normal tricuspid valve. Mild tricuspid  regurgitation. Pulmonic valve not well visualized.  Mild  pulmonic regurgitation.  Pericardium/PleuraNormal pericardium with no pericardial  effusion.  Hemodynamic: Estimated right ventricular systolic pressure  equals 46 mm Hg, assuming right atrial pressure equals 10  mm Hg, consistent with mild pulmonary hypertension.  ------------------------------------------------------------------------  CONCLUSIONS:  1. Normal mitral valve. Systolic anterior motion of the  mitral valve. Moderate mitral regurgitation.  2. Mild concentric left ventricular hypertrophy. There is a  prominent basal septum (1.4 cm). The mid septum (1.0 cm).  3. Hyperdynamic left ventricle. There is evidence for  dynamic left ventricular outflow obstruction. Peak left  ventricular outflow tract gradient equals 231 mm Hg, mean  gradient is equal to 89 mm Hg, LVOT velocity time integral  equals 166 cm, consistent with severe LVOT obstruction.  4. Normal right ventricular size and function.  5. Estimated right ventricular systolic pressure equals 46  mm Hg, assuming right atrial pressure equals 10 mm Hg,  consistent with mild pulmonary hypertension.  *** Compared with echocardiogram of 4/15/2019, severe  dynamic LVOT obstruction is now seen. Mild pulmonary HTN is  now seen.  ------------------------------------------------------------------------  Confirmed on  2019 - 20:05:57 by Mick Rothman M.D.  ------------------------------------------------------------------------    < end of copied text >    < from: Cardiac Cath Lab - Adult (19 @ 13:59) >  ml, IV.  VENTRICLES: No left ventriculogram was performed.  CORONARY VESSELS: The coronary circulation is right dominant.  LM:   --  LM: Normal.  LAD:   --  LAD: Normal.  CX:   --  Ostial circumflex: There was a discrete 30 % stenosis.  RCA:   --  Proximal RCA: There was a 0 % stenosis at the site of a prior  stent.  COMPLICATIONS: No complications occurred during the cath lab visit.  DIAGNOSTIC RECOMMENDATIONS: Medical management is recommended.  Prepared and signed by  Morgan Santoyo M.D.  Signed 2019 14:36:32  HEMODYNAMIC TABLES  Pressures:  NO PHASE  Pressures:  - HR: 72  Pressures:  - Rhythm:  Pressures:  -- Aortic Pressure (S/D/M): 141/68/101  Outputs:  NO PHASE  Outputs:  -- CALCULATIONS[age]  Outputs:  -- CALCULATIONS: Body Surface Area: 1.70  Outputs:  -- CALCULATIONS: Height in cm: 150.00  Outputs:  -- CALCULATIONS: Sex: Female  Outputs:  -- CALCULATIONS: Weight in k.80  Outputs:  -- OUTPUTS: O2 consumption: 212.50  Outputs:  -- OUTPUTS: Vo2 Indexed: 125.00    < end of copied text >      < from: Cardiac Cath Lab - Adult (19 @ 15:18) >  LM:   --  LM: Angiography showed minor luminal irregularities with no flow  limiting lesions.  LAD:   --  LAD: Angiography showed minor luminal irregularities with no  flow limiting lesions.  CX:   --  Circumflex: Angiography showed minor luminal irregularities with  no flow limiting lesions.  RCA:   --  Proximal RCA: There was a 70 % stenosis.  COMPLICATIONS: There were no complications.  DIAGNOSTIC RECOMMENDATIONS: IFR was 0.78 in the RCA  INTERVENTIONAL RECOMMENDATIONS: IFR was 0.78 in the RCA  Prepared and signed by  Jerry Allen M.D.  Signed 2019 16:45:27  HEMODYNAMIC TABLES  Pressures:  Baseline  Pressures:  - HR: 71  Pressures:  - Rhythm:  Pressures:  -- Aortic Pressure (S/D/M): 174/76/115  Pressures:  Intervention  Pressures:  - HR: 75  Pressures:  - Rhythm:  Pressures:  -- Aortic Pressure (S/D/M): 176/79/119  Outputs:  Baseline  Outputs:  -- CALCULATIONS: [age]  Outputs:  -- CALCULATIONS: Body Surface Area: 1.76  Outputs:  -- CALCULATIONS: Height in cm: 150.00  Outputs:  -- CALCULATIONS: Sex: Female  Outputs:  -- CALCULATIONS: Weight in k.60    < end of copied text >    < from: Xray Chest 2 Views PA/Lat (19 @ 16:40) >    TECHNIQUE:   PA and lateral chest radiographs    FINDINGS:     The heart is normal in size. Clear lungs. No pleural effusions or pneumothorax.      IMPRESSION:  Clear lungs    < end of copied text > Date of Admission: 19    CHIEF COMPLAINT: chest pressure     HISTORY OF PRESENT ILLNESS:      75 y/o [place] female with PMHX of HTN, HLD, spinal stenosis s/p sx, CAD s/p stent to prox RCA on  comes in with complaints of chest pressure. She states it was the same pain she felt when she presented initially to NS on , but the pain did not resolve even s/p stent. Pain is worse when she eats, and also when she walks up a flight of stairs. Also admits to palpitations and feeling of anxiety. Denies SOB, syncope. 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 Also her brother diet at age 65, was told he had a heart murmur. Non smoker, no ETOH or drug abuse.     Allergies    sulfa drugs (Swelling)    MEDICATIONS:  aspirin enteric coated 81 milliGRAM(s) Oral daily  clopidogrel Tablet 75 milliGRAM(s) Oral daily  metoprolol succinate ER 50 milliGRAM(s) Oral daily        gabapentin 100 milliGRAM(s) Oral daily  melatonin 3 milliGRAM(s) Oral at bedtime    pantoprazole    Tablet 40 milliGRAM(s) Oral before breakfast    atorvastatin 40 milliGRAM(s) Oral at bedtime    folic acid 1 milliGRAM(s) Oral daily  pyridoxine 100 milliGRAM(s) Oral daily  sodium chloride 1 Gram(s) Oral two times a day  sodium chloride 0.65% Nasal 1 Spray(s) Both Nostrils four times a day  sodium chloride 0.9% lock flush 3 milliLiter(s) IV Push every 8 hours      PAST MEDICAL & SURGICAL HISTORY:  Left ventricular outflow tract obstruction  Spinal stenosis  HTN (hypertension)  History of back surgery  Status post LASIK surgery of both eyes      FAMILY HISTORY:  Family history of coronary artery disease in brother: Brother passed away from CAD at age 60      SOCIAL HISTORY:    [ ] Non-smoker  [ ] Smoker  [ ] Alcohol      REVIEW OF SYSTEMS:  CONSTITUTIONAL: No fever, weight loss, or fatigue  EYES: No eye pain, visual disturbances, or discharge  ENMT:  No difficulty hearing, tinnitus, vertigo; No sinus or throat pain  NECK: No pain or stiffness  RESPIRATORY: No cough, wheezing, chills or hemoptysis; No Shortness of Breath  CARDIOVASCULAR: No chest pain, palpitations, passing out, dizziness, or leg swelling  GASTROINTESTINAL: No abdominal or epigastric pain. No nausea, vomiting, or hematemesis; No diarrhea or constipation. No melena or hematochezia.  GENITOURINARY: No dysuria, frequency, hematuria, or incontinence  NEUROLOGICAL: No headaches, memory loss, loss of strength, numbness, or tremors  SKIN: No itching, burning, rashes, or lesions   LYMPH Nodes: No enlarged glands  ENDOCRINE: No heat or cold intolerance; No hair loss  MUSCULOSKELETAL: No joint pain or swelling; No muscle, back, or extremity pain  PSYCHIATRIC: No depression, anxiety, mood swings, or difficulty sleeping  HEME/LYMPH: No easy bruising, or bleeding gums  ALLERY AND IMMUNOLOGIC: No hives or eczema	    [ ] All others negative	  [ ] Unable to obtain    PHYSICAL EXAM:  T(C): 36.6 (19 @ 05:16), Max: 36.9 (19 @ 16:38)  HR: 69 (19 @ 05:16) (55 - 73)  BP: 116/69 (19 @ 05:16) (116/69 - 154/58)  RR: 16 (19 @ 05:16) (16 - 17)  SpO2: 100% (19 @ 05:16) (94% - 100%)  Wt(kg): --  I&O's Summary      Appearance: Normal	  HEENT:   Normal oral mucosa, PERRL, EOMI	  Lymphatic: No lymphadenopathy  Cardiovascular: Normal S1 S2, No JVD, No murmurs, No edema  Respiratory: Lungs clear to auscultation	  Psychiatry: A & O x 3, Mood & affect appropriate  Gastrointestinal:  Soft, Non-tender, + BS	  Skin: No rashes, No ecchymoses, No cyanosis	  Neurologic: Non-focal  Extremities: Normal range of motion, No clubbing, cyanosis or edema  Vascular: Peripheral pulses palpable 2+ bilaterally        LABS:	 	    CBC Full  -  ( 24 Dec 2019 06:58 )  WBC Count : 7.29 K/uL  Hemoglobin : 12.0 g/dL  Hematocrit : 35.3 %  Platelet Count - Automated : 140 K/uL  Mean Cell Volume : 95.1 fL  Mean Cell Hemoglobin : 32.3 pg  Mean Cell Hemoglobin Concentration : 34.0 %  Auto Neutrophil # : x  Auto Lymphocyte # : x  Auto Monocyte # : x  Auto Eosinophil # : x  Auto Basophil # : x  Auto Neutrophil % : x  Auto Lymphocyte % : x  Auto Monocyte % : x  Auto Eosinophil % : x  Auto Basophil % : x        135  |  102  |  11  ----------------------------<  125<H>  4.1   |  21<L>  |  0.72      133<L>  |  100  |  11  ----------------------------<  136<H>  4.1   |  22  |  0.64    Ca    8.9      24 Dec 2019 06:58  Ca    8.8      23 Dec 2019 05:20  Phos  2.9       Mg     2.3       Mg     2.4           < from: Transthoracic Echocardiogram (19 @ 18:15) >  DIMENSIONS:  Dimensions:     Normal Values:  LA:     4.2 cm    2.0 - 4.0 cm  Ao:     2.7 cm    2.0 - 3.8 cm  SEPTUM: 1.4 cm    0.6 - 1.2 cm  PWT:    1.0 cm    0.6 - 1.1 cm  LVIDd:  4.5 cm    3.0 - 5.6 cm  LVIDs:  3.1 cm    1.8 - 4.0 cm  Derived Variables:  LVMI: 117 g/m2  RWT: 0.44  Fractional short: 31 %  Ejection Fraction (Visual Estimate): 65-70 %  Peak Velocity (m/sec): TV=3.0  ------------------------------------------------------------------------  OBSERVATIONS:  Mitral Valve: Normal mitral valve. Systolic anterior motion  of the mitral valve. Moderate mitral regurgitation.  Aortic Root: Normal aortic root.  Aortic Valve: Calcified trileaflet aortic valve with normal  opening. Unable to accurately evaluate aortic stenosis in  the setting of severe LVOT obstruction.  Mild aortic  regurgitation.  There is evidence for dynamic left  ventricular outflow obstruction. Peak left ventricular  outflow tract gradient equals 231 mm Hg, mean gradient is  equal to 89 mm Hg, LVOT velocity time integral equals 166  cm, consistent with severe LVOT obstruction.  Left Atrium: Moderately dilated left atrium.  LA volume  index = 48 cc/m2.  Left Ventricle: Hyperdynamic left ventricle. Mild  concentric left ventricular hypertrophy. There is a  prominent basal septum (1.4 cm). The mid septum (1.0 cm).  Mild diastolic dysfunction (Stage I).  Right Heart: Normal right atrium. Normal right ventricular  size and function. Normal tricuspid valve. Mild tricuspid  regurgitation. Pulmonic valve not well visualized.  Mild  pulmonic regurgitation.  Pericardium/PleuraNormal pericardium with no pericardial  effusion.  Hemodynamic: Estimated right ventricular systolic pressure  equals 46 mm Hg, assuming right atrial pressure equals 10  mm Hg, consistent with mild pulmonary hypertension.  ------------------------------------------------------------------------  CONCLUSIONS:  1. Normal mitral valve. Systolic anterior motion of the  mitral valve. Moderate mitral regurgitation.  2. Mild concentric left ventricular hypertrophy. There is a  prominent basal septum (1.4 cm). The mid septum (1.0 cm).  3. Hyperdynamic left ventricle. There is evidence for  dynamic left ventricular outflow obstruction. Peak left  ventricular outflow tract gradient equals 231 mm Hg, mean  gradient is equal to 89 mm Hg, LVOT velocity time integral  equals 166 cm, consistent with severe LVOT obstruction.  4. Normal right ventricular size and function.  5. Estimated right ventricular systolic pressure equals 46  mm Hg, assuming right atrial pressure equals 10 mm Hg,  consistent with mild pulmonary hypertension.  *** Compared with echocardiogram of 4/15/2019, severe  dynamic LVOT obstruction is now seen. Mild pulmonary HTN is  now seen.  ------------------------------------------------------------------------  Confirmed on  2019 - 20:05:57 by Mick Rothman M.D.  ------------------------------------------------------------------------    < end of copied text >    < from: Cardiac Cath Lab - Adult (19 @ 13:59) >  ml, IV.  VENTRICLES: No left ventriculogram was performed.  CORONARY VESSELS: The coronary circulation is right dominant.  LM:   --  LM: Normal.  LAD:   --  LAD: Normal.  CX:   --  Ostial circumflex: There was a discrete 30 % stenosis.  RCA:   --  Proximal RCA: There was a 0 % stenosis at the site of a prior  stent.  COMPLICATIONS: No complications occurred during the cath lab visit.  DIAGNOSTIC RECOMMENDATIONS: Medical management is recommended.  Prepared and signed by  Morgan Santoyo M.D.  Signed 2019 14:36:32  HEMODYNAMIC TABLES  Pressures:  NO PHASE  Pressures:  - HR: 72  Pressures:  - Rhythm:  Pressures:  -- Aortic Pressure (S/D/M): 141/68/101  Outputs:  NO PHASE  Outputs:  -- CALCULATIONS[age]  Outputs:  -- CALCULATIONS: Body Surface Area: 1.70  Outputs:  -- CALCULATIONS: Height in cm: 150.00  Outputs:  -- CALCULATIONS: Sex: Female  Outputs:  -- CALCULATIONS: Weight in k.80  Outputs:  -- OUTPUTS: O2 consumption: 212.50  Outputs:  -- OUTPUTS: Vo2 Indexed: 125.00    < end of copied text >      < from: Cardiac Cath Lab - Adult (19 @ 15:18) >  LM:   --  LM: Angiography showed minor luminal irregularities with no flow  limiting lesions.  LAD:   --  LAD: Angiography showed minor luminal irregularities with no  flow limiting lesions.  CX:   --  Circumflex: Angiography showed minor luminal irregularities with  no flow limiting lesions.  RCA:   --  Proximal RCA: There was a 70 % stenosis.  COMPLICATIONS: There were no complications.  DIAGNOSTIC RECOMMENDATIONS: IFR was 0.78 in the RCA  INTERVENTIONAL RECOMMENDATIONS: IFR was 0.78 in the RCA  Prepared and signed by  Jerry Allen M.D.  Signed 2019 16:45:27  HEMODYNAMIC TABLES  Pressures:  Baseline  Pressures:  - HR: 71  Pressures:  - Rhythm:  Pressures:  -- Aortic Pressure (S/D/M): 174/76/115  Pressures:  Intervention  Pressures:  - HR: 75  Pressures:  - Rhythm:  Pressures:  -- Aortic Pressure (S/D/M): 176/79/119  Outputs:  Baseline  Outputs:  -- CALCULATIONS[age]  Outputs:  -- CALCULATIONS: Body Surface Area: 1.76  Outputs:  -- CALCULATIONS: Height in cm: 150.00  Outputs:  -- CALCULATIONS: Sex: Female  Outputs:  -- CALCULATIONS: Weight in k.60    < end of copied text >    < from: Xray Chest 2 Views PA/Lat (19 @ 16:40) >    TECHNIQUE:   PA and lateral chest radiographs    FINDINGS:     The heart is normal in size. Clear lungs. No pleural effusions or pneumothorax.      IMPRESSION:  Clear lungs    < end of copied text > Date of Admission: 19    CHIEF COMPLAINT: chest pressure     HISTORY OF PRESENT ILLNESS:      75 y/o [place] female with PMHX of HTN, HLD, spinal stenosis s/p sx, CAD s/p stent to prox RCA on  comes in with complaints of chest pressure. She states it was the same pain she felt when she presented initially to NS on , but the pain did not resolve even s/p stent. Pain is worse when she eats, and also when she walks up a flight of stairs. Also admits to palpitations and feeling of anxiety. Denies SOB, syncope. 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 Also her brother diet at age 65, was told he had a heart murmur. Non smoker, no ETOH or drug abuse. HF consulted on  for TTE shows LVEF 65-70%, mod MR, severe dynamic LVOT.     Allergies    sulfa drugs (Swelling)    MEDICATIONS:  aspirin enteric coated 81 milliGRAM(s) Oral daily  clopidogrel Tablet 75 milliGRAM(s) Oral daily  metoprolol succinate ER 50 milliGRAM(s) Oral daily        gabapentin 100 milliGRAM(s) Oral daily  melatonin 3 milliGRAM(s) Oral at bedtime    pantoprazole    Tablet 40 milliGRAM(s) Oral before breakfast    atorvastatin 40 milliGRAM(s) Oral at bedtime    folic acid 1 milliGRAM(s) Oral daily  pyridoxine 100 milliGRAM(s) Oral daily  sodium chloride 1 Gram(s) Oral two times a day  sodium chloride 0.65% Nasal 1 Spray(s) Both Nostrils four times a day  sodium chloride 0.9% lock flush 3 milliLiter(s) IV Push every 8 hours      PAST MEDICAL & SURGICAL HISTORY:  Left ventricular outflow tract obstruction  Spinal stenosis  HTN (hypertension)  History of back surgery  Status post LASIK surgery of both eyes      FAMILY HISTORY/ SOCIAL HISTORY:    She has a significant family history of a daughter passing away at 46; hx of 2 MIs in past, did not wake up one morning. Also her brother diet at age 65, was told he had a heart murmur. Non smoker, no ETOH or drug abuse.       REVIEW OF SYSTEMS:  CONSTITUTIONAL: No fever, weight loss, or fatigue  EYES: No eye pain, visual disturbances, or discharge  ENMT:  No difficulty hearing, tinnitus, vertigo; No sinus or throat pain  NECK: No pain or stiffness  RESPIRATORY: No cough, wheezing, chills or hemoptysis; No Shortness of Breath  CARDIOVASCULAR: admits to palpitations and chest pressure. no passing out, dizziness, or leg swelling  GASTROINTESTINAL: No abdominal or epigastric pain. No nausea, vomiting, or hematemesis; No diarrhea or constipation. No melena or hematochezia.  GENITOURINARY: No dysuria, frequency, hematuria, or incontinence  NEUROLOGICAL: No headaches, memory loss, loss of strength, numbness, or tremors  SKIN: No itching, burning, rashes, or lesions   LYMPH Nodes: No enlarged glands  ENDOCRINE: No heat or cold intolerance; No hair loss  MUSCULOSKELETAL: No joint pain or swelling; No muscle, back, or extremity pain  PSYCHIATRIC: No depression, anxiety, mood swings, or difficulty sleeping  HEME/LYMPH: No easy bruising, or bleeding gums  ALLERY AND IMMUNOLOGIC: No hives or eczema	    [ ] All others negative	  [ ] Unable to obtain    PHYSICAL EXAM:  T(C): 36.6 (19 @ 05:16), Max: 36.9 (19 @ 16:38)  HR: 69 (19 @ 05:16) (55 - 73)  BP: 116/69 (19 @ 05:16) (116/69 - 154/58)  RR: 16 (19 @ 05:16) (16 - 17)  SpO2: 100% (19 @ 05:16) (94% - 100%)  Wt(kg): --  I&O's Summary      Appearance: Normal	  HEENT:   Normal oral mucosa, PERRL, EOMI	  Lymphatic: No lymphadenopathy  Cardiovascular: IV/VI holosystolic murmur,  Normal JVP, No edema, warm b/l.   Respiratory: Lungs clear to auscultation	  Psychiatry: A & O x 3, Mood & affect appropriate  Gastrointestinal:  Soft, Non-tender, + BS	  Skin: No rashes, No ecchymoses, No cyanosis	  Neurologic: Non-focal  Extremities: Normal range of motion, No clubbing, cyanosis or edema  Vascular: Peripheral pulses palpable 2+ bilaterally        LABS:	 	    CBC Full  -  ( 24 Dec 2019 06:58 )  WBC Count : 7.29 K/uL  Hemoglobin : 12.0 g/dL  Hematocrit : 35.3 %  Platelet Count - Automated : 140 K/uL  Mean Cell Volume : 95.1 fL  Mean Cell Hemoglobin : 32.3 pg  Mean Cell Hemoglobin Concentration : 34.0 %  Auto Neutrophil # : x  Auto Lymphocyte # : x  Auto Monocyte # : x  Auto Eosinophil # : x  Auto Basophil # : x  Auto Neutrophil % : x  Auto Lymphocyte % : x  Auto Monocyte % : x  Auto Eosinophil % : x  Auto Basophil % : x        135  |  102  |  11  ----------------------------<  125<H>  4.1   |  21<L>  |  0.72      133<L>  |  100  |  11  ----------------------------<  136<H>  4.1   |  22  |  0.64    Ca    8.9      24 Dec 2019 06:58  Ca    8.8      23 Dec 2019 05:20  Phos  2.9     -  Mg     2.3       Mg     2.4     12      < from: Transthoracic Echocardiogram (19 @ 18:15) >  DIMENSIONS:  Dimensions:     Normal Values:  LA:     4.2 cm    2.0 - 4.0 cm  Ao:     2.7 cm    2.0 - 3.8 cm  SEPTUM: 1.4 cm    0.6 - 1.2 cm  PWT:    1.0 cm    0.6 - 1.1 cm  LVIDd:  4.5 cm    3.0 - 5.6 cm  LVIDs:  3.1 cm    1.8 - 4.0 cm  Derived Variables:  LVMI: 117 g/m2  RWT: 0.44  Fractional short: 31 %  Ejection Fraction (Visual Estimate): 65-70 %  Peak Velocity (m/sec): TV=3.0  ------------------------------------------------------------------------  OBSERVATIONS:  Mitral Valve: Normal mitral valve. Systolic anterior motion  of the mitral valve. Moderate mitral regurgitation.  Aortic Root: Normal aortic root.  Aortic Valve: Calcified trileaflet aortic valve with normal  opening. Unable to accurately evaluate aortic stenosis in  the setting of severe LVOT obstruction.  Mild aortic  regurgitation.  There is evidence for dynamic left  ventricular outflow obstruction. Peak left ventricular  outflow tract gradient equals 231 mm Hg, mean gradient is  equal to 89 mm Hg, LVOT velocity time integral equals 166  cm, consistent with severe LVOT obstruction.  Left Atrium: Moderately dilated left atrium.  LA volume  index = 48 cc/m2.  Left Ventricle: Hyperdynamic left ventricle. Mild  concentric left ventricular hypertrophy. There is a  prominent basal septum (1.4 cm). The mid septum (1.0 cm).  Mild diastolic dysfunction (Stage I).  Right Heart: Normal right atrium. Normal right ventricular  size and function. Normal tricuspid valve. Mild tricuspid  regurgitation. Pulmonic valve not well visualized.  Mild  pulmonic regurgitation.  Pericardium/PleuraNormal pericardium with no pericardial  effusion.  Hemodynamic: Estimated right ventricular systolic pressure  equals 46 mm Hg, assuming right atrial pressure equals 10  mm Hg, consistent with mild pulmonary hypertension.  ------------------------------------------------------------------------  CONCLUSIONS:  1. Normal mitral valve. Systolic anterior motion of the  mitral valve. Moderate mitral regurgitation.  2. Mild concentric left ventricular hypertrophy. There is a  prominent basal septum (1.4 cm). The mid septum (1.0 cm).  3. Hyperdynamic left ventricle. There is evidence for  dynamic left ventricular outflow obstruction. Peak left  ventricular outflow tract gradient equals 231 mm Hg, mean  gradient is equal to 89 mm Hg, LVOT velocity time integral  equals 166 cm, consistent with severe LVOT obstruction.  4. Normal right ventricular size and function.  5. Estimated right ventricular systolic pressure equals 46  mm Hg, assuming right atrial pressure equals 10 mm Hg,  consistent with mild pulmonary hypertension.  *** Compared with echocardiogram of 4/15/2019, severe  dynamic LVOT obstruction is now seen. Mild pulmonary HTN is  now seen.  ------------------------------------------------------------------------  Confirmed on  2019 - 20:05:57 by Mick Rothman M.D.  ------------------------------------------------------------------------    < end of copied text >    < from: Cardiac Cath Lab - Adult (19 @ 13:59) >  ml, IV.  VENTRICLES: No left ventriculogram was performed.  CORONARY VESSELS: The coronary circulation is right dominant.  LM:   --  LM: Normal.  LAD:   --  LAD: Normal.  CX:   --  Ostial circumflex: There was a discrete 30 % stenosis.  RCA:   --  Proximal RCA: There was a 0 % stenosis at the site of a prior  stent.  COMPLICATIONS: No complications occurred during the cath lab visit.  DIAGNOSTIC RECOMMENDATIONS: Medical management is recommended.  Prepared and signed by  Morgan Santoyo M.D.  Signed 2019 14:36:32  HEMODYNAMIC TABLES  Pressures:  NO PHASE  Pressures:  - HR: 72  Pressures:  - Rhythm:  Pressures:  -- Aortic Pressure (S/D/M): 141/68/101  Outputs:  NO PHASE  Outputs:  -- CALCULATIONS[age]  Outputs:  -- CALCULATIONS: Body Surface Area: 1.70  Outputs:  -- CALCULATIONS: Height in cm: 150.00  Outputs:  -- CALCULATIONS: Sex: Female  Outputs:  -- CALCULATIONS: Weight in k.80  Outputs:  -- OUTPUTS: O2 consumption: 212.50  Outputs:  -- OUTPUTS: Vo2 Indexed: 125.00    < end of copied text >      < from: Cardiac Cath Lab - Adult (19 @ 15:18) >  LM:   --  LM: Angiography showed minor luminal irregularities with no flow  limiting lesions.  LAD:   --  LAD: Angiography showed minor luminal irregularities with no  flow limiting lesions.  CX:   --  Circumflex: Angiography showed minor luminal irregularities with  no flow limiting lesions.  RCA:   --  Proximal RCA: There was a 70 % stenosis.  COMPLICATIONS: There were no complications.  DIAGNOSTIC RECOMMENDATIONS: IFR was 0.78 in the RCA  INTERVENTIONAL RECOMMENDATIONS: IFR was 0.78 in the RCA  Prepared and signed by  Jerry Allen M.D.  Signed 2019 16:45:27  HEMODYNAMIC TABLES  Pressures:  Baseline  Pressures:  - HR: 71  Pressures:  - Rhythm:  Pressures:  -- Aortic Pressure (S/D/M): 174/76/115  Pressures:  Intervention  Pressures:  - HR: 75  Pressures:  - Rhythm:  Pressures:  -- Aortic Pressure (S/D/M): 176/79/119  Outputs:  Baseline  Outputs:  -- CALCULATIONS[age]  Outputs:  -- CALCULATIONS: Body Surface Area: 1.76  Outputs:  -- CALCULATIONS: Height in cm: 150.00  Outputs:  -- CALCULATIONS: Sex: Female  Outputs:  -- CALCULATIONS: Weight in k.60    < end of copied text >    < from: Xray Chest 2 Views PA/Lat (.19 @ 16:40) >    TECHNIQUE:   PA and lateral chest radiographs    FINDINGS:     The heart is normal in size. Clear lungs. No pleural effusions or pneumothorax.      IMPRESSION:  Clear lungs    < end of copied text >

## 2019-12-25 LAB
ANION GAP SERPL CALC-SCNC: 11 MMO/L — SIGNIFICANT CHANGE UP (ref 7–14)
BACTERIA UR CULT: SIGNIFICANT CHANGE UP
BUN SERPL-MCNC: 8 MG/DL — SIGNIFICANT CHANGE UP (ref 7–23)
CALCIUM SERPL-MCNC: 9 MG/DL — SIGNIFICANT CHANGE UP (ref 8.4–10.5)
CHLORIDE SERPL-SCNC: 102 MMOL/L — SIGNIFICANT CHANGE UP (ref 98–107)
CO2 SERPL-SCNC: 21 MMOL/L — LOW (ref 22–31)
CREAT SERPL-MCNC: 0.75 MG/DL — SIGNIFICANT CHANGE UP (ref 0.5–1.3)
GLUCOSE SERPL-MCNC: 113 MG/DL — HIGH (ref 70–99)
HCT VFR BLD CALC: 34.8 % — SIGNIFICANT CHANGE UP (ref 34.5–45)
HGB BLD-MCNC: 11.7 G/DL — SIGNIFICANT CHANGE UP (ref 11.5–15.5)
MAGNESIUM SERPL-MCNC: 2.3 MG/DL — SIGNIFICANT CHANGE UP (ref 1.6–2.6)
MCHC RBC-ENTMCNC: 32.1 PG — SIGNIFICANT CHANGE UP (ref 27–34)
MCHC RBC-ENTMCNC: 33.6 % — SIGNIFICANT CHANGE UP (ref 32–36)
MCV RBC AUTO: 95.6 FL — SIGNIFICANT CHANGE UP (ref 80–100)
NRBC # FLD: 0 K/UL — SIGNIFICANT CHANGE UP (ref 0–0)
PHOSPHATE SERPL-MCNC: 3.2 MG/DL — SIGNIFICANT CHANGE UP (ref 2.5–4.5)
PLATELET # BLD AUTO: 143 K/UL — LOW (ref 150–400)
PMV BLD: 11.8 FL — SIGNIFICANT CHANGE UP (ref 7–13)
POTASSIUM SERPL-MCNC: 4.1 MMOL/L — SIGNIFICANT CHANGE UP (ref 3.5–5.3)
POTASSIUM SERPL-SCNC: 4.1 MMOL/L — SIGNIFICANT CHANGE UP (ref 3.5–5.3)
RBC # BLD: 3.64 M/UL — LOW (ref 3.8–5.2)
RBC # FLD: 13.1 % — SIGNIFICANT CHANGE UP (ref 10.3–14.5)
SODIUM SERPL-SCNC: 134 MMOL/L — LOW (ref 135–145)
SPECIMEN SOURCE: SIGNIFICANT CHANGE UP
WBC # BLD: 5.39 K/UL — SIGNIFICANT CHANGE UP (ref 3.8–10.5)
WBC # FLD AUTO: 5.39 K/UL — SIGNIFICANT CHANGE UP (ref 3.8–10.5)

## 2019-12-25 PROCEDURE — 93010 ELECTROCARDIOGRAM REPORT: CPT

## 2019-12-25 RX ADMIN — CLOPIDOGREL BISULFATE 75 MILLIGRAM(S): 75 TABLET, FILM COATED ORAL at 12:52

## 2019-12-25 RX ADMIN — ATORVASTATIN CALCIUM 40 MILLIGRAM(S): 80 TABLET, FILM COATED ORAL at 21:33

## 2019-12-25 RX ADMIN — Medication 200 MILLIGRAM(S): at 08:40

## 2019-12-25 RX ADMIN — Medication 100 MILLIGRAM(S): at 09:17

## 2019-12-25 RX ADMIN — SODIUM CHLORIDE 3 MILLILITER(S): 9 INJECTION INTRAMUSCULAR; INTRAVENOUS; SUBCUTANEOUS at 06:59

## 2019-12-25 RX ADMIN — Medication 81 MILLIGRAM(S): at 12:53

## 2019-12-25 RX ADMIN — Medication 1 SPRAY(S): at 07:00

## 2019-12-25 RX ADMIN — SODIUM CHLORIDE 3 MILLILITER(S): 9 INJECTION INTRAMUSCULAR; INTRAVENOUS; SUBCUTANEOUS at 21:35

## 2019-12-25 RX ADMIN — Medication 3 MILLIGRAM(S): at 21:33

## 2019-12-25 RX ADMIN — SODIUM CHLORIDE 3 MILLILITER(S): 9 INJECTION INTRAMUSCULAR; INTRAVENOUS; SUBCUTANEOUS at 12:56

## 2019-12-25 RX ADMIN — Medication 1 MILLIGRAM(S): at 12:53

## 2019-12-25 RX ADMIN — Medication 100 MILLIGRAM(S): at 21:32

## 2019-12-25 RX ADMIN — Medication 200 MILLIGRAM(S): at 17:29

## 2019-12-25 RX ADMIN — GABAPENTIN 100 MILLIGRAM(S): 400 CAPSULE ORAL at 12:53

## 2019-12-25 RX ADMIN — PANTOPRAZOLE SODIUM 40 MILLIGRAM(S): 20 TABLET, DELAYED RELEASE ORAL at 07:00

## 2019-12-25 RX ADMIN — Medication 100 MILLIGRAM(S): at 12:53

## 2019-12-25 NOTE — PROGRESS NOTE ADULT - SUBJECTIVE AND OBJECTIVE BOX
[facility]    Subjective: Patient seen and examined. No new events except as noted.   feeling better     REVIEW OF SYSTEMS:    CONSTITUTIONAL: No weakness, fevers or chills  EYES/ENT: No visual changes;  No vertigo or throat pain   NECK: No pain or stiffness  RESPIRATORY: No cough, wheezing, hemoptysis; No shortness of breath  CARDIOVASCULAR: No chest pain or palpitations  GASTROINTESTINAL: No abdominal or epigastric pain  GENITOURINARY: No dysuria, frequency or hematuria  NEUROLOGICAL: No numbness or weakness  SKIN: No itching, burning, rashes, or lesions   All other review of systems is negative unless indicated above.    MEDICATIONS:  MEDICATIONS  (STANDING):  aspirin enteric coated 81 milliGRAM(s) Oral daily  atorvastatin 40 milliGRAM(s) Oral at bedtime  clopidogrel Tablet 75 milliGRAM(s) Oral daily  Cyanocobalamin 100 mcg Tablet 100 MICROGram(s) 1 Tablet(s) Oral daily  disopyramide 200 milliGRAM(s) Oral every 12 hours  folic acid 1 milliGRAM(s) Oral daily  gabapentin 100 milliGRAM(s) Oral daily  melatonin 3 milliGRAM(s) Oral at bedtime  metoprolol tartrate 50 milliGRAM(s) Oral two times a day  pantoprazole    Tablet 40 milliGRAM(s) Oral before breakfast  pyridoxine 100 milliGRAM(s) Oral daily  sodium chloride 0.65% Nasal 1 Spray(s) Both Nostrils four times a day  sodium chloride 0.9% lock flush 3 milliLiter(s) IV Push every 8 hours      PHYSICAL EXAM:  T(C): 36.8 (12-25-19 @ 21:31), Max: 36.8 (12-25-19 @ 06:58)  HR: 57 (12-25-19 @ 21:31) (55 - 60)  BP: 137/86 (12-25-19 @ 21:31) (126/65 - 148/62)  RR: 17 (12-25-19 @ 21:31) (17 - 17)  SpO2: 96% (12-25-19 @ 21:31) (96% - 100%)  Wt(kg): --  I&O's Summary    25 Dec 2019 07:01  -  25 Dec 2019 22:32  --------------------------------------------------------  IN: 340 mL / OUT: 0 mL / NET: 340 mL          Appearance: Normal	  HEENT:   Normal oral mucosa, PERRL, EOMI	  Lymphatic: No lymphadenopathy , no edema  Cardiovascular: Normal S1 S2, No JVD, No murmurs , Peripheral pulses palpable 2+ bilaterally  Respiratory: Lungs clear to auscultation, normal effort 	  Gastrointestinal:  Soft, Non-tender, + BS	  Skin: No rashes, No ecchymoses, No cyanosis, warm to touch  Musculoskeletal: Normal range of motion, normal strength  Psychiatry:  Mood & affect appropriate  Ext: No edema      All labs, Imaging and EKGs personally reviewed                             11.7   5.39  )-----------( 143      ( 25 Dec 2019 06:35 )             34.8               12-25    134<L>  |  102  |  8   ----------------------------<  113<H>  4.1   |  21<L>  |  0.75    Ca    9.0      25 Dec 2019 06:35  Phos  3.2     12-25  Mg     2.3     12-25

## 2019-12-25 NOTE — PROGRESS NOTE ADULT - SUBJECTIVE AND OBJECTIVE BOX
Patient is a 74y old  Female who presents with a chief complaint of Chest pain (25 Dec 2019 13:21)      INTERVAL HISTORY:     TELEMETRY:  	  MEDICATIONS:  disopyramide 200 milliGRAM(s) Oral every 12 hours  metoprolol tartrate 50 milliGRAM(s) Oral two times a day        PHYSICAL EXAM:  T(C): 36.8 (12-25-19 @ 21:31), Max: 36.8 (12-25-19 @ 06:58)  HR: 57 (12-25-19 @ 21:31) (55 - 60)  BP: 137/86 (12-25-19 @ 21:31) (126/65 - 148/62)  RR: 17 (12-25-19 @ 21:31) (17 - 17)  SpO2: 96% (12-25-19 @ 21:31) (96% - 100%)  Wt(kg): --  I&O's Summary    25 Dec 2019 07:01  -  25 Dec 2019 21:51  --------------------------------------------------------  IN: 340 mL / OUT: 0 mL / NET: 340 mL          Appearance: In no distress	  HEENT:    PERRL, EOMI	  Cardiovascular:  S1 S2, No JVD  Respiratory: Lungs clear to auscultation	  Gastrointestinal:  Soft, Non-tender, + BS	  Extremities:  No edema of LE                                11.7   5.39  )-----------( 143      ( 25 Dec 2019 06:35 )             34.8     12-25    134<L>  |  102  |  8   ----------------------------<  113<H>  4.1   |  21<L>  |  0.75    Ca    9.0      25 Dec 2019 06:35  Phos  3.2     12-25  Mg     2.3     12-25          Labs personally reviewed      Assessment /Plan:   MORENO/SOB - cath with patent stents     TTE with Peak LV outflow tract gradient equals 231 mm Hg, mean gradient is equal to 89 mm Hg, this may very well explain her symptoms of MORENO  started disopyramide 200mg q12 by Dr Serna yesterday,  QTc 472 m sec , close monitoring of daily EKG to r/o QTc prolonging  CTS consulted for myectomy        Harry Christianson DO [facility]  Cardiovascular Medicine  [address] [place] [address], [address]  Office: [phone]  Cell: [phone]

## 2019-12-25 NOTE — PROGRESS NOTE ADULT - SUBJECTIVE AND OBJECTIVE BOX
LAVERNE GUADALUPEGO:[id number],   74yFemale followed for:  sulfa drugs (Swelling)    PAST MEDICAL & SURGICAL HISTORY:  Left ventricular outflow tract obstruction  Spinal stenosis  HTN (hypertension)  History of back surgery  Status post LASIK surgery of both eyes    FAMILY HISTORY:  Family history of coronary artery disease in brother: Brother passed away from CAD at age 60    MEDICATIONS  (STANDING):  aspirin enteric coated 81 milliGRAM(s) Oral daily  atorvastatin 40 milliGRAM(s) Oral at bedtime  clopidogrel Tablet 75 milliGRAM(s) Oral daily  Cyanocobalamin 100 mcg Tablet 100 MICROGram(s) 1 Tablet(s) Oral daily  disopyramide 200 milliGRAM(s) Oral every 12 hours  folic acid 1 milliGRAM(s) Oral daily  gabapentin 100 milliGRAM(s) Oral daily  melatonin 3 milliGRAM(s) Oral at bedtime  metoprolol tartrate 50 milliGRAM(s) Oral two times a day  pantoprazole    Tablet 40 milliGRAM(s) Oral before breakfast  pyridoxine 100 milliGRAM(s) Oral daily  sodium chloride 0.65% Nasal 1 Spray(s) Both Nostrils four times a day  sodium chloride 0.9% lock flush 3 milliLiter(s) IV Push every 8 hours    MEDICATIONS  (PRN):  guaiFENesin   Syrup  (Sugar-Free) 100 milliGRAM(s) Oral every 6 hours PRN Cough      Vital Signs Last 24 Hrs  T(C): 36.8 (25 Dec 2019 06:58), Max: 37.7 (24 Dec 2019 12:34)  T(F): 98.2 (25 Dec 2019 06:58), Max: 99.8 (24 Dec 2019 12:34)  HR: 58 (25 Dec 2019 06:58) (58 - 71)  BP: 127/60 (25 Dec 2019 06:58) (119/64 - 134/64)  BP(mean): --  RR: 17 (25 Dec 2019 06:58) (16 - 17)  SpO2: 100% (25 Dec 2019 06:58) (95% - 100%)  nc/at  s1s2  cta  soft, nt, nd no guarding or rebound  no c/c/e    CBC Full  -  ( 25 Dec 2019 06:35 )  WBC Count : 5.39 K/uL  RBC Count : 3.64 M/uL  Hemoglobin : 11.7 g/dL  Hematocrit : 34.8 %  Platelet Count - Automated : 143 K/uL  Mean Cell Volume : 95.6 fL  Mean Cell Hemoglobin : 32.1 pg  Mean Cell Hemoglobin Concentration : 33.6 %  Auto Neutrophil # : x  Auto Lymphocyte # : x  Auto Monocyte # : x  Auto Eosinophil # : x  Auto Basophil # : x  Auto Neutrophil % : x  Auto Lymphocyte % : x  Auto Monocyte % : x  Auto Eosinophil % : x  Auto Basophil % : x    12-25    134<L>  |  102  |  8   ----------------------------<  113<H>  4.1   |  21<L>  |  0.75    Ca    9.0      25 Dec 2019 06:35  Phos  3.2     12-25  Mg     2.3     12-25

## 2019-12-25 NOTE — PROGRESS NOTE ADULT - SUBJECTIVE AND OBJECTIVE BOX
Patient is a 74y old  Female who presents with a chief complaint of Chest pain (25 Dec 2019 10:55)      Any change in ROS: pt is doing ok : no SOB     MEDICATIONS  (STANDING):  aspirin enteric coated 81 milliGRAM(s) Oral daily  atorvastatin 40 milliGRAM(s) Oral at bedtime  clopidogrel Tablet 75 milliGRAM(s) Oral daily  Cyanocobalamin 100 mcg Tablet 100 MICROGram(s) 1 Tablet(s) Oral daily  disopyramide 200 milliGRAM(s) Oral every 12 hours  folic acid 1 milliGRAM(s) Oral daily  gabapentin 100 milliGRAM(s) Oral daily  melatonin 3 milliGRAM(s) Oral at bedtime  metoprolol tartrate 50 milliGRAM(s) Oral two times a day  pantoprazole    Tablet 40 milliGRAM(s) Oral before breakfast  pyridoxine 100 milliGRAM(s) Oral daily  sodium chloride 0.65% Nasal 1 Spray(s) Both Nostrils four times a day  sodium chloride 0.9% lock flush 3 milliLiter(s) IV Push every 8 hours    MEDICATIONS  (PRN):  guaiFENesin   Syrup  (Sugar-Free) 100 milliGRAM(s) Oral every 6 hours PRN Cough    Vital Signs Last 24 Hrs  T(C): 36.7 (25 Dec 2019 12:51), Max: 37 (24 Dec 2019 21:21)  T(F): 98.1 (25 Dec 2019 12:51), Max: 98.6 (24 Dec 2019 21:21)  HR: 60 (25 Dec 2019 12:51) (58 - 71)  BP: 126/65 (25 Dec 2019 12:51) (119/64 - 134/64)  BP(mean): --  RR: 17 (25 Dec 2019 12:51) (17 - 17)  SpO2: 100% (25 Dec 2019 12:51) (96% - 100%)    I&O's Summary        Physical Exam:   GENERAL: NAD, well-groomed, well-developed  HEENT: DEBBI/   Atraumatic, Normocephalic  ENMT: No tonsillar erythema, exudates, or enlargement; Moist mucous membranes, Good dentition, No lesions  NECK: Supple, No JVD, Normal thyroid  CHEST/LUNG: Clear to auscultaion, ; No rales, rhonchi, wheezing, or rubs  CVS: Regular rate and rhythm; No murmurs, rubs, or gallops  GI: : Soft, Nontender, Nondistended; Bowel sounds present  NERVOUS SYSTEM:  Alert & Oriented X3  EXTREMITIES:  2+ Peripheral Pulses, No clubbing, cyanosis, or edema  LYMPH: No lymphadenopathy noted  SKIN: No rashes or lesions  ENDOCRINOLOGY: No Thyromegaly  PSYCH: Appropriate    Labs:                              11.7   5.39  )-----------( 143      ( 25 Dec 2019 06:35 )             34.8                         12.0   7.29  )-----------( 140      ( 24 Dec 2019 06:58 )             35.3                         11.7   7.70  )-----------( 144      ( 23 Dec 2019 05:20 )             34.9                         11.8   7.01  )-----------( 140      ( 22 Dec 2019 05:30 )             33.9     12-25    134<L>  |  102  |  8   ----------------------------<  113<H>  4.1   |  21<L>  |  0.75  12-24    135  |  102  |  11  ----------------------------<  125<H>  4.1   |  21<L>  |  0.72  12-23    133<L>  |  100  |  11  ----------------------------<  136<H>  4.1   |  22  |  0.64  12-22    130<L>  |  99  |  13  ----------------------------<  123<H>  3.9   |  21<L>  |  0.63    Ca    9.0      25 Dec 2019 06:35  Ca    8.9      24 Dec 2019 06:58  Phos  3.2     12-25  Mg     2.3     12-25  Mg     2.3     12-24      CAPILLARY BLOOD GLUCOSE              < from: Xray Chest 2 Views PA/Lat (12.20.19 @ 16:40) >    EXAM:  XR CHEST PA LAT 2V        PROCEDURE DATE:  Dec 20 2019         INTERPRETATION:  CLINICAL INFORMATION: Chest pain and shortness of breath    COMPARISON:  CTA chest from 4/12/2019    TECHNIQUE:   PA and lateral chest radiographs    FINDINGS:     The heart is normal in size. Clear lungs. No pleural effusions or pneumothorax.      IMPRESSION:  Clear lungs        < from: CT Angio Chest w/ IV Cont (04.12.19 @ 19:40) >    PLEURA: No pleural effusion.    VESSELS: Limited evaluation due to respiratory motion artifact. No main,   right, left, lobar pulmonary embolism. Limited evaluation of subsegmental   pulmonary arteries due to motion artifact.    HEART: Mild Cardiomegaly No pericardial effusion.    MEDIASTINUM AND LYUDMILA: No lymphadenopathy.    CHEST WALL AND LOWER NECK: Within normal limits.    VISUALIZED UPPER ABDOMEN: Within normal limits.    BONES: Degenerative changes.    IMPRESSION:     No main, right, left, lobar pulmonary embolism. Limited evaluation of   subsegmental pulmonary arteries due to motion artifact.    Diffuse bilateral patchy groundglass opacities may represent infection   versus edema.                DENISSE KHAN M.D., RADIOLOGY RESIDENT  This document has been electronically signed.  JAQUELINE TEJADA M.D., ATTENDING RADIOLOGIST  This document has been electronically signed. Apr 12 2019  9:03PM    < end of copied text >        MARTIR SENA M.D., RADIOLOGY RESIDENT  This document has been electronically signed.  WHITLEY ALFONSO M.D., ATTENDING RADIOLOGIST  This document has been electronically signed. Dec 21 2019  9:34AM    < end of copied text >          RECENT CULTURES:        RESPIRATORY CULTURES:          Studies  Chest X-RAY  CT SCAN Chest   Venous Dopplers: LE:   CT Abdomen  Others

## 2019-12-26 LAB
ANION GAP SERPL CALC-SCNC: 10 MMO/L — SIGNIFICANT CHANGE UP (ref 7–14)
ANION GAP SERPL CALC-SCNC: 10 MMO/L — SIGNIFICANT CHANGE UP (ref 7–14)
BUN SERPL-MCNC: 11 MG/DL — SIGNIFICANT CHANGE UP (ref 7–23)
BUN SERPL-MCNC: 11 MG/DL — SIGNIFICANT CHANGE UP (ref 7–23)
CALCIUM SERPL-MCNC: 9.1 MG/DL — SIGNIFICANT CHANGE UP (ref 8.4–10.5)
CALCIUM SERPL-MCNC: 9.1 MG/DL — SIGNIFICANT CHANGE UP (ref 8.4–10.5)
CHLORIDE SERPL-SCNC: 102 MMOL/L — SIGNIFICANT CHANGE UP (ref 98–107)
CHLORIDE SERPL-SCNC: 102 MMOL/L — SIGNIFICANT CHANGE UP (ref 98–107)
CO2 SERPL-SCNC: 22 MMOL/L — SIGNIFICANT CHANGE UP (ref 22–31)
CO2 SERPL-SCNC: 22 MMOL/L — SIGNIFICANT CHANGE UP (ref 22–31)
CREAT SERPL-MCNC: 0.77 MG/DL — SIGNIFICANT CHANGE UP (ref 0.5–1.3)
CREAT SERPL-MCNC: 0.77 MG/DL — SIGNIFICANT CHANGE UP (ref 0.5–1.3)
GLUCOSE SERPL-MCNC: 130 MG/DL — HIGH (ref 70–99)
GLUCOSE SERPL-MCNC: 130 MG/DL — HIGH (ref 70–99)
HCT VFR BLD CALC: 35.7 % — SIGNIFICANT CHANGE UP (ref 34.5–45)
HGB BLD-MCNC: 12.1 G/DL — SIGNIFICANT CHANGE UP (ref 11.5–15.5)
MAGNESIUM SERPL-MCNC: 2.3 MG/DL — SIGNIFICANT CHANGE UP (ref 1.6–2.6)
MCHC RBC-ENTMCNC: 32.3 PG — SIGNIFICANT CHANGE UP (ref 27–34)
MCHC RBC-ENTMCNC: 33.9 % — SIGNIFICANT CHANGE UP (ref 32–36)
MCV RBC AUTO: 95.2 FL — SIGNIFICANT CHANGE UP (ref 80–100)
NRBC # FLD: 0 K/UL — SIGNIFICANT CHANGE UP (ref 0–0)
PHOSPHATE SERPL-MCNC: 3.9 MG/DL — SIGNIFICANT CHANGE UP (ref 2.5–4.5)
PLATELET # BLD AUTO: 143 K/UL — LOW (ref 150–400)
PMV BLD: 11.3 FL — SIGNIFICANT CHANGE UP (ref 7–13)
POTASSIUM SERPL-MCNC: 4 MMOL/L — SIGNIFICANT CHANGE UP (ref 3.5–5.3)
POTASSIUM SERPL-MCNC: 4 MMOL/L — SIGNIFICANT CHANGE UP (ref 3.5–5.3)
POTASSIUM SERPL-SCNC: 4 MMOL/L — SIGNIFICANT CHANGE UP (ref 3.5–5.3)
POTASSIUM SERPL-SCNC: 4 MMOL/L — SIGNIFICANT CHANGE UP (ref 3.5–5.3)
RBC # BLD: 3.75 M/UL — LOW (ref 3.8–5.2)
RBC # FLD: 13.2 % — SIGNIFICANT CHANGE UP (ref 10.3–14.5)
SODIUM SERPL-SCNC: 134 MMOL/L — LOW (ref 135–145)
SODIUM SERPL-SCNC: 134 MMOL/L — LOW (ref 135–145)
WBC # BLD: 6.01 K/UL — SIGNIFICANT CHANGE UP (ref 3.8–10.5)
WBC # FLD AUTO: 6.01 K/UL — SIGNIFICANT CHANGE UP (ref 3.8–10.5)

## 2019-12-26 PROCEDURE — 75561 CARDIAC MRI FOR MORPH W/DYE: CPT | Mod: 26

## 2019-12-26 PROCEDURE — 93010 ELECTROCARDIOGRAM REPORT: CPT | Mod: 76

## 2019-12-26 RX ADMIN — Medication 1 MILLIGRAM(S): at 17:54

## 2019-12-26 RX ADMIN — Medication 3 MILLIGRAM(S): at 21:10

## 2019-12-26 RX ADMIN — CLOPIDOGREL BISULFATE 75 MILLIGRAM(S): 75 TABLET, FILM COATED ORAL at 17:54

## 2019-12-26 RX ADMIN — Medication 1 SPRAY(S): at 06:21

## 2019-12-26 RX ADMIN — PANTOPRAZOLE SODIUM 40 MILLIGRAM(S): 20 TABLET, DELAYED RELEASE ORAL at 06:21

## 2019-12-26 RX ADMIN — Medication 100 MILLIGRAM(S): at 17:55

## 2019-12-26 RX ADMIN — Medication 81 MILLIGRAM(S): at 17:54

## 2019-12-26 RX ADMIN — GABAPENTIN 100 MILLIGRAM(S): 400 CAPSULE ORAL at 21:10

## 2019-12-26 RX ADMIN — SODIUM CHLORIDE 3 MILLILITER(S): 9 INJECTION INTRAMUSCULAR; INTRAVENOUS; SUBCUTANEOUS at 06:21

## 2019-12-26 RX ADMIN — Medication 200 MILLIGRAM(S): at 06:20

## 2019-12-26 RX ADMIN — Medication 50 MILLIGRAM(S): at 17:54

## 2019-12-26 RX ADMIN — SODIUM CHLORIDE 3 MILLILITER(S): 9 INJECTION INTRAMUSCULAR; INTRAVENOUS; SUBCUTANEOUS at 20:39

## 2019-12-26 RX ADMIN — Medication 100 MILLIGRAM(S): at 21:10

## 2019-12-26 RX ADMIN — ATORVASTATIN CALCIUM 40 MILLIGRAM(S): 80 TABLET, FILM COATED ORAL at 21:10

## 2019-12-26 NOTE — PROGRESS NOTE ADULT - SUBJECTIVE AND OBJECTIVE BOX
LAVERNE GUADALUPEGO:[id number],   74yFemale followed for:  sulfa drugs (Swelling)    PAST MEDICAL & SURGICAL HISTORY:  Left ventricular outflow tract obstruction  Spinal stenosis  HTN (hypertension)  History of back surgery  Status post LASIK surgery of both eyes    FAMILY HISTORY:  Family history of coronary artery disease in brother: Brother passed away from CAD at age 60    MEDICATIONS  (STANDING):  aspirin enteric coated 81 milliGRAM(s) Oral daily  atorvastatin 40 milliGRAM(s) Oral at bedtime  clopidogrel Tablet 75 milliGRAM(s) Oral daily  Cyanocobalamin 100 mcg Tablet 100 MICROGram(s) 1 Tablet(s) Oral daily  disopyramide 200 milliGRAM(s) Oral every 12 hours  folic acid 1 milliGRAM(s) Oral daily  gabapentin 100 milliGRAM(s) Oral daily  melatonin 3 milliGRAM(s) Oral at bedtime  metoprolol tartrate 50 milliGRAM(s) Oral two times a day  pantoprazole    Tablet 40 milliGRAM(s) Oral before breakfast  pyridoxine 100 milliGRAM(s) Oral daily  sodium chloride 0.65% Nasal 1 Spray(s) Both Nostrils four times a day  sodium chloride 0.9% lock flush 3 milliLiter(s) IV Push every 8 hours    MEDICATIONS  (PRN):  guaiFENesin   Syrup  (Sugar-Free) 100 milliGRAM(s) Oral every 6 hours PRN Cough      Vital Signs Last 24 Hrs  T(C): 36.6 (26 Dec 2019 06:18), Max: 36.8 (25 Dec 2019 21:31)  T(F): 97.9 (26 Dec 2019 06:18), Max: 98.3 (25 Dec 2019 21:31)  HR: 55 (26 Dec 2019 06:18) (55 - 60)  BP: 114/85 (26 Dec 2019 06:18) (114/85 - 148/62)  BP(mean): --  RR: 17 (26 Dec 2019 06:18) (17 - 17)  SpO2: 99% (26 Dec 2019 06:18) (96% - 100%)  nc/at  s1s2  cta  soft, nt, nd no guarding or rebound  no c/c/e    CBC Full  -  ( 26 Dec 2019 06:15 )  WBC Count : 6.01 K/uL  RBC Count : 3.75 M/uL  Hemoglobin : 12.1 g/dL  Hematocrit : 35.7 %  Platelet Count - Automated : 143 K/uL  Mean Cell Volume : 95.2 fL  Mean Cell Hemoglobin : 32.3 pg  Mean Cell Hemoglobin Concentration : 33.9 %  Auto Neutrophil # : x  Auto Lymphocyte # : x  Auto Monocyte # : x  Auto Eosinophil # : x  Auto Basophil # : x  Auto Neutrophil % : x  Auto Lymphocyte % : x  Auto Monocyte % : x  Auto Eosinophil % : x  Auto Basophil % : x    12-26    134<L>  |  102  |  11  ----------------------------<  130<H>  4.0   |  22  |  0.77    Ca    9.1      26 Dec 2019 06:15  Phos  3.9     12-26  Mg     2.3     12-26

## 2019-12-26 NOTE — PROGRESS NOTE ADULT - SUBJECTIVE AND OBJECTIVE BOX
[facility]    Subjective: Patient seen and examined. No new events except as noted.   doing okay     REVIEW OF SYSTEMS:    CONSTITUTIONAL: No weakness, fevers or chills  EYES/ENT: No visual changes;  No vertigo or throat pain   NECK: No pain or stiffness  RESPIRATORY: No cough, wheezing, hemoptysis; No shortness of breath  CARDIOVASCULAR: No chest pain or palpitations  GASTROINTESTINAL: No abdominal or epigastric pain. No nausea, vomiting, or hematemesis; No diarrhea or constipation. No melena or hematochezia.  GENITOURINARY: No dysuria, frequency or hematuria  NEUROLOGICAL: No numbness or weakness  SKIN: No itching, burning, rashes, or lesions   All other review of systems is negative unless indicated above.    MEDICATIONS:  MEDICATIONS  (STANDING):  aspirin enteric coated 81 milliGRAM(s) Oral daily  atorvastatin 40 milliGRAM(s) Oral at bedtime  clopidogrel Tablet 75 milliGRAM(s) Oral daily  Cyanocobalamin 100 mcg Tablet 100 MICROGram(s) 1 Tablet(s) Oral daily  disopyramide 200 milliGRAM(s) Oral every 12 hours  folic acid 1 milliGRAM(s) Oral daily  gabapentin 100 milliGRAM(s) Oral daily  melatonin 3 milliGRAM(s) Oral at bedtime  metoprolol tartrate 50 milliGRAM(s) Oral two times a day  pantoprazole    Tablet 40 milliGRAM(s) Oral before breakfast  pyridoxine 100 milliGRAM(s) Oral daily  sodium chloride 0.65% Nasal 1 Spray(s) Both Nostrils four times a day  sodium chloride 0.9% lock flush 3 milliLiter(s) IV Push every 8 hours      PHYSICAL EXAM:  T(C): 36.6 (12-26-19 @ 06:18), Max: 36.8 (12-25-19 @ 21:31)  HR: 55 (12-26-19 @ 06:18) (55 - 57)  BP: 114/85 (12-26-19 @ 06:18) (114/85 - 148/62)  RR: 17 (12-26-19 @ 06:18) (17 - 17)  SpO2: 99% (12-26-19 @ 06:18) (96% - 99%)  Wt(kg): --  I&O's Summary    25 Dec 2019 07:01  -  26 Dec 2019 07:00  --------------------------------------------------------  IN: 340 mL / OUT: 0 mL / NET: 340 mL          Appearance: Normal	  HEENT:   Normal oral mucosa, PERRL, EOMI	  Lymphatic: No lymphadenopathy , no edema  Cardiovascular: Normal S1 S2, No JVD, No murmurs , Peripheral pulses palpable 2+ bilaterally  Respiratory: Lungs clear to auscultation, normal effort 	  Gastrointestinal:  Soft, Non-tender, + BS	  Skin: No rashes, No ecchymoses, No cyanosis, warm to touch  Musculoskeletal: Normal range of motion, normal strength  Psychiatry:  Mood & affect appropriate  Ext: No edema      All labs, Imaging and EKGs personally reviewed                           12.1   6.01  )-----------( 143      ( 26 Dec 2019 06:15 )             35.7               12-26    134<L>  |  102  |  11  ----------------------------<  130<H>  4.0   |  22  |  0.77    Ca    9.1      26 Dec 2019 06:15  Phos  3.9     12-26  Mg     2.3     12-26

## 2019-12-26 NOTE — PROGRESS NOTE ADULT - SUBJECTIVE AND OBJECTIVE BOX
Pt seen and examined at bedside  feels well. Denies any compls  no chest pain,dyspnea,dizziness  no n/v/d      Allergies:  sulfa drugs (Swelling)    Hospital Medications:   MEDICATIONS  (STANDING):  aspirin enteric coated 81 milliGRAM(s) Oral daily  atorvastatin 40 milliGRAM(s) Oral at bedtime  clopidogrel Tablet 75 milliGRAM(s) Oral daily  Cyanocobalamin 100 mcg Tablet 100 MICROGram(s) 1 Tablet(s) Oral daily  disopyramide 200 milliGRAM(s) Oral every 12 hours  folic acid 1 milliGRAM(s) Oral daily  gabapentin 100 milliGRAM(s) Oral daily  melatonin 3 milliGRAM(s) Oral at bedtime  metoprolol tartrate 50 milliGRAM(s) Oral two times a day  pantoprazole    Tablet 40 milliGRAM(s) Oral before breakfast  pyridoxine 100 milliGRAM(s) Oral daily  sodium chloride 0.65% Nasal 1 Spray(s) Both Nostrils four times a day  sodium chloride 0.9% lock flush 3 milliLiter(s) IV Push every 8 hours         VITALS:  T(F): 97.9 (19 @ 06:18), Max: 98.3 (19 @ 21:31)  HR: 55 (19 @ 06:18)  BP: 114/85 (19 @ 06:18)  RR: 17 (19 @ 06:18)  SpO2: 99% (19 @ 06:18)  Wt(kg): --     @ 07:01  -   @ 07:00  --------------------------------------------------------  IN: 340 mL / OUT: 0 mL / NET: 340 mL        PHYSICAL EXAM:  Constitutional: NAD  HEENT: anicteric sclera, oropharynx clear, MMM  Neck: No JVD  Respiratory: CTAB, no wheezes, rales or rhonchi  Cardiovascular: S1, S2, RRR  Gastrointestinal: BS+, soft, NT/ND  Extremities: No cyanosis or clubbing. No peripheral edema  Neurological: A/O x 3, no focal deficits  Psychiatric: Normal mood, normal affect  : No CVA tenderness. No ortiz.   Skin: No rashes       LABS:      134<L>  |  102  |  11  ----------------------------<  130<H>  4.0   |  22  |  0.77    Ca    9.1      26 Dec 2019 06:15  Phos  3.9       Mg     2.3           Creatinine Trend: 0.77 <--, 0.75 <--, 0.72 <--, 0.64 <--, 0.63 <--, 0.64 <--, 0.70 <--                        12.1   6.01  )-----------( 143      ( 26 Dec 2019 06:15 )             35.7     Urine Studies:  Urinalysis Basic - ( 22 Dec 2019 01:15 )    Color: LIGHT YELLOW / Appearance: CLEAR / S.012 / pH: 6.0  Gluc: NEGATIVE / Ketone: NEGATIVE  / Bili: NEGATIVE / Urobili: NORMAL   Blood: NEGATIVE / Protein: NEGATIVE / Nitrite: NEGATIVE   Leuk Esterase: MODERATE / RBC: 0-2 / WBC 11-25   Sq Epi: FEW / Non Sq Epi:  / Bacteria: MODERATE      Osmolality, Random Urine: 292 mosmo/kg ( @ 13:00)    RADIOLOGY & ADDITIONAL STUDIES:

## 2019-12-26 NOTE — PROGRESS NOTE ADULT - SUBJECTIVE AND OBJECTIVE BOX
Patient seen and examined. She had no complaints- no CP, palpitations.  No SOB, MORENO.   Went for Cardiac MRI this morning, but was sent back due to needing clarification about the cardiac stent.      Medications:  aspirin enteric coated 81 milliGRAM(s) Oral daily  atorvastatin 40 milliGRAM(s) Oral at bedtime  clopidogrel Tablet 75 milliGRAM(s) Oral daily  Cyanocobalamin 100 mcg Tablet 100 MICROGram(s) 1 Tablet(s) Oral daily  disopyramide 200 milliGRAM(s) Oral every 12 hours  folic acid 1 milliGRAM(s) Oral daily  gabapentin 100 milliGRAM(s) Oral daily  guaiFENesin   Syrup  (Sugar-Free) 100 milliGRAM(s) Oral every 6 hours PRN  melatonin 3 milliGRAM(s) Oral at bedtime  metoprolol tartrate 50 milliGRAM(s) Oral two times a day  pantoprazole    Tablet 40 milliGRAM(s) Oral before breakfast  pyridoxine 100 milliGRAM(s) Oral daily  sodium chloride 0.65% Nasal 1 Spray(s) Both Nostrils four times a day  sodium chloride 0.9% lock flush 3 milliLiter(s) IV Push every 8 hours      Vitals:  Vital Signs Last 24 Hrs  T(C): 36.6 (26 Dec 2019 06:18), Max: 36.8 (25 Dec 2019 21:31)  T(F): 97.9 (26 Dec 2019 06:18), Max: 98.3 (25 Dec 2019 21:31)  HR: 55 (26 Dec 2019 06:18) (55 - 60)  BP: 114/85 (26 Dec 2019 06:18) (114/85 - 148/62)  BP(mean): --  RR: 17 (26 Dec 2019 06:18) (17 - 17)  SpO2: 99% (26 Dec 2019 06:18) (96% - 100%)    Daily     Daily     I&O's Detail    25 Dec 2019 07:01  -  26 Dec 2019 07:00  --------------------------------------------------------  IN:    Oral Fluid: 340 mL  Total IN: 340 mL    OUT:  Total OUT: 0 mL    Total NET: 340 mL          Physical Exam:     General: No distress. Comfortable.  HEENT: EOM intact.  Neck: Neck supple. JVP normal. No masses  Chest: Clear to auscultation bilaterally  CV: IV/VI holosystolic murmur.no rub, or gallops. Radial pulses normal. No LE edema, warm b/l.  Abdomen: Soft, non-distended, non-tender  Skin: No rashes or skin breakdown  Neurology: Alert and oriented times three. Sensation intact  Psych: Affect normal    Labs:                        12.1   6.01  )-----------( 143      ( 26 Dec 2019 06:15 )             35.7     12-26    134<L>  |  102  |  11  ----------------------------<  130<H>  4.0   |  22  |  0.77    Ca    9.1      26 Dec 2019 06:15  Phos  3.9     12-26  Mg     2.3     12-26

## 2019-12-26 NOTE — PROGRESS NOTE ADULT - SUBJECTIVE AND OBJECTIVE BOX
Patient is a 74y old  Female who presents with a chief complaint of Chest pain (26 Dec 2019 13:54)      INTERVAL HISTORY: feels well  	  MEDICATIONS:  metoprolol tartrate 50 milliGRAM(s) Oral two times a day        PHYSICAL EXAM:  T(C): 36.8 (12-26-19 @ 21:09), Max: 36.8 (12-26-19 @ 21:09)  HR: 56 (12-26-19 @ 21:09) (55 - 62)  BP: 113/55 (12-26-19 @ 21:09) (113/55 - 143/75)  RR: 17 (12-26-19 @ 21:09) (17 - 17)  SpO2: 98% (12-26-19 @ 21:09) (98% - 99%)  Wt(kg): --  I&O's Summary    25 Dec 2019 07:01  -  26 Dec 2019 07:00  --------------------------------------------------------  IN: 340 mL / OUT: 0 mL / NET: 340 mL          Appearance: In no distress	  HEENT:    PERRL, EOMI	  Cardiovascular:  S1 S2, No JVD  Respiratory: Lungs clear to auscultation	  Gastrointestinal:  Soft, Non-tender, + BS	  Extremities:  No edema of LE                                12.1   6.01  )-----------( 143      ( 26 Dec 2019 06:15 )             35.7     12-26    134<L>  |  102  |  11  ----------------------------<  130<H>  4.0   |  22  |  0.77    Ca    9.1      26 Dec 2019 06:15  Phos  3.9     12-26  Mg     2.3     12-26          Labs personally reviewed      Assessment /Plan:   MORENO/SOB - cath with patent stents   TTE with Peak LV outflow tract gradient equals 231 mm Hg, mean gradient is equal to 89 mm Hg, this may very well explain her symptoms of MORENO  MRI same as echo  d/c disopyramide as QTc now 500ms  CTS consulted for myectomy  If not candidate for surgical myectomy will consider cryoablation      Harry Christianson DO [facility]  Cardiovascular Medicine  [address] [place] [address], [address]  Office: [phone]  Cell: [phone]

## 2019-12-27 LAB
ANION GAP SERPL CALC-SCNC: 12 MMO/L — SIGNIFICANT CHANGE UP (ref 7–14)
BUN SERPL-MCNC: 11 MG/DL — SIGNIFICANT CHANGE UP (ref 7–23)
CALCIUM SERPL-MCNC: 8.7 MG/DL — SIGNIFICANT CHANGE UP (ref 8.4–10.5)
CHLORIDE SERPL-SCNC: 103 MMOL/L — SIGNIFICANT CHANGE UP (ref 98–107)
CO2 SERPL-SCNC: 19 MMOL/L — LOW (ref 22–31)
CREAT SERPL-MCNC: 0.74 MG/DL — SIGNIFICANT CHANGE UP (ref 0.5–1.3)
GLUCOSE SERPL-MCNC: 118 MG/DL — HIGH (ref 70–99)
HCT VFR BLD CALC: 35.1 % — SIGNIFICANT CHANGE UP (ref 34.5–45)
HGB BLD-MCNC: 11.7 G/DL — SIGNIFICANT CHANGE UP (ref 11.5–15.5)
MAGNESIUM SERPL-MCNC: 2.3 MG/DL — SIGNIFICANT CHANGE UP (ref 1.6–2.6)
MCHC RBC-ENTMCNC: 32.2 PG — SIGNIFICANT CHANGE UP (ref 27–34)
MCHC RBC-ENTMCNC: 33.3 % — SIGNIFICANT CHANGE UP (ref 32–36)
MCV RBC AUTO: 96.7 FL — SIGNIFICANT CHANGE UP (ref 80–100)
NRBC # FLD: 0 K/UL — SIGNIFICANT CHANGE UP (ref 0–0)
PHOSPHATE SERPL-MCNC: 3.4 MG/DL — SIGNIFICANT CHANGE UP (ref 2.5–4.5)
PLATELET # BLD AUTO: 165 K/UL — SIGNIFICANT CHANGE UP (ref 150–400)
PMV BLD: 11.4 FL — SIGNIFICANT CHANGE UP (ref 7–13)
POTASSIUM SERPL-MCNC: 4.2 MMOL/L — SIGNIFICANT CHANGE UP (ref 3.5–5.3)
POTASSIUM SERPL-SCNC: 4.2 MMOL/L — SIGNIFICANT CHANGE UP (ref 3.5–5.3)
RBC # BLD: 3.63 M/UL — LOW (ref 3.8–5.2)
RBC # FLD: 13.2 % — SIGNIFICANT CHANGE UP (ref 10.3–14.5)
SODIUM SERPL-SCNC: 134 MMOL/L — LOW (ref 135–145)
WBC # BLD: 7.32 K/UL — SIGNIFICANT CHANGE UP (ref 3.8–10.5)
WBC # FLD AUTO: 7.32 K/UL — SIGNIFICANT CHANGE UP (ref 3.8–10.5)

## 2019-12-27 PROCEDURE — 99233 SBSQ HOSP IP/OBS HIGH 50: CPT

## 2019-12-27 PROCEDURE — 93325 DOPPLER ECHO COLOR FLOW MAPG: CPT | Mod: 26,GC

## 2019-12-27 PROCEDURE — 93308 TTE F-UP OR LMTD: CPT | Mod: 26,GC

## 2019-12-27 PROCEDURE — 93010 ELECTROCARDIOGRAM REPORT: CPT

## 2019-12-27 PROCEDURE — 93321 DOPPLER ECHO F-UP/LMTD STD: CPT | Mod: 26

## 2019-12-27 RX ORDER — METOPROLOL TARTRATE 50 MG
50 TABLET ORAL
Refills: 0 | Status: DISCONTINUED | OUTPATIENT
Start: 2019-12-27 | End: 2019-12-28

## 2019-12-27 RX ADMIN — Medication 3 MILLIGRAM(S): at 23:24

## 2019-12-27 RX ADMIN — Medication 81 MILLIGRAM(S): at 17:22

## 2019-12-27 RX ADMIN — Medication 100 MILLIGRAM(S): at 18:34

## 2019-12-27 RX ADMIN — Medication 100 MILLIGRAM(S): at 17:21

## 2019-12-27 RX ADMIN — Medication 100 MILLIGRAM(S): at 23:24

## 2019-12-27 RX ADMIN — CLOPIDOGREL BISULFATE 75 MILLIGRAM(S): 75 TABLET, FILM COATED ORAL at 17:22

## 2019-12-27 RX ADMIN — Medication 1 SPRAY(S): at 17:22

## 2019-12-27 RX ADMIN — SODIUM CHLORIDE 3 MILLILITER(S): 9 INJECTION INTRAMUSCULAR; INTRAVENOUS; SUBCUTANEOUS at 05:33

## 2019-12-27 RX ADMIN — GABAPENTIN 100 MILLIGRAM(S): 400 CAPSULE ORAL at 17:21

## 2019-12-27 RX ADMIN — ATORVASTATIN CALCIUM 40 MILLIGRAM(S): 80 TABLET, FILM COATED ORAL at 21:55

## 2019-12-27 RX ADMIN — SODIUM CHLORIDE 3 MILLILITER(S): 9 INJECTION INTRAMUSCULAR; INTRAVENOUS; SUBCUTANEOUS at 12:56

## 2019-12-27 RX ADMIN — Medication 1 MILLIGRAM(S): at 17:22

## 2019-12-27 RX ADMIN — Medication 50 MILLIGRAM(S): at 18:29

## 2019-12-27 RX ADMIN — SODIUM CHLORIDE 3 MILLILITER(S): 9 INJECTION INTRAMUSCULAR; INTRAVENOUS; SUBCUTANEOUS at 21:54

## 2019-12-27 NOTE — DIETITIAN INITIAL EVALUATION ADULT. - ADD RECOMMEND
1. Suggest: Resume PO diet once Clinically indicated;     2. Rec: PO diet rx: Consistent Carbohydrate (no snacks), DASH/TLC (cholesterol and sodium restricted), Low Fat; Fluid Restriction per MD discretion;     3. Once PO diet resumed monitor PO diet tolerance; Honor food preferences;    4. Monitor labs, hydration status;

## 2019-12-27 NOTE — DIETITIAN INITIAL EVALUATION ADULT. - OTHER INFO
Pt 73 yo female with PMHx of HTN, CAD - per chart review. At time of visit Pt appears alert, oriented; no family @ bed side. Case discussed with nurse. Of note Pt NPO X possible CHAR. Per Pt her appetite usually good; no report of chewing/swallowing difficulty; no report of nausea, vomiting or diarrhea @ this time. No report of weight loss or weight changes PTA. No other food related concerns voiced @ present. RDN remains available.

## 2019-12-27 NOTE — PROGRESS NOTE ADULT - SUBJECTIVE AND OBJECTIVE BOX
Subjective:  Disopyramide stopped due to QT > 500. Reports improved symptoms overall, ambulated the halls yesterday.     Medications:  aspirin enteric coated 81 milliGRAM(s) Oral daily  atorvastatin 40 milliGRAM(s) Oral at bedtime  clopidogrel Tablet 75 milliGRAM(s) Oral daily  Cyanocobalamin 100 mcg Tablet 100 MICROGram(s) 1 Tablet(s) Oral daily  folic acid 1 milliGRAM(s) Oral daily  gabapentin 100 milliGRAM(s) Oral daily  guaiFENesin   Syrup  (Sugar-Free) 100 milliGRAM(s) Oral every 6 hours PRN  melatonin 3 milliGRAM(s) Oral at bedtime  metoprolol tartrate 50 milliGRAM(s) Oral two times a day  pantoprazole    Tablet 40 milliGRAM(s) Oral before breakfast  pyridoxine 100 milliGRAM(s) Oral daily  sodium chloride 0.65% Nasal 1 Spray(s) Both Nostrils four times a day  sodium chloride 0.9% lock flush 3 milliLiter(s) IV Push every 8 hours    Vitals:  T(C): 36.6 (19 @ 13:30), Max: 36.8 (19 @ 21:09)  HR: 63 (19 @ 18:28) (56 - 98)  BP: 106/67 (19 @ 18:28) (94/60 - 120/98)  BP(mean): --  RR: 17 (19 @ 13:30) (17 - 17)  SpO2: 97% (19 @ 13:30) (97% - 98%)    Daily     Daily Weight in k.8 (27 Dec 2019 10:35)        I&O's Summary      Physical Exam:  Appearance: No Acute Distress  HEENT: JVP non elevated  Cardiovascular: RRR, Normal S1 S2, 3/6 late peaking murmur at sternal border that increases when standing (improved since admission)  Respiratory: Clear to auscultation bilaterally  Gastrointestinal: Soft, Non-tender, non-distended	  Skin: no skin lesions  Neurologic: Non-focal  Extremities: No LE edema, warm and well perfused  Psychiatry: A & O x 3, Mood & affect appropriate      Labs:                        11.7   7.32  )-----------( 165      ( 27 Dec 2019 06:05 )             35.1         134<L>  |  103  |  11  ----------------------------<  118<H>  4.2   |  19<L>  |  0.74    Ca    8.7      27 Dec 2019 06:05  Phos  3.4       Mg     2.3

## 2019-12-27 NOTE — PROGRESS NOTE ADULT - SUBJECTIVE AND OBJECTIVE BOX
[facility]    Subjective: Patient seen and examined. No new events except as noted.     REVIEW OF SYSTEMS:    CONSTITUTIONAL: No weakness, fevers or chills  EYES/ENT: No visual changes;  No vertigo or throat pain   NECK: No pain or stiffness  RESPIRATORY: No cough, wheezing, hemoptysis; No shortness of breath  CARDIOVASCULAR: No chest pain or palpitations  GASTROINTESTINAL: No abdominal or epigastric pain. No nausea, vomiting, or hematemesis; No diarrhea or constipation. No melena or hematochezia.  GENITOURINARY: No dysuria, frequency or hematuria  NEUROLOGICAL: No numbness or weakness  SKIN: No itching, burning, rashes, or lesions   All other review of systems is negative unless indicated above.    MEDICATIONS:  MEDICATIONS  (STANDING):  aspirin enteric coated 81 milliGRAM(s) Oral daily  atorvastatin 40 milliGRAM(s) Oral at bedtime  clopidogrel Tablet 75 milliGRAM(s) Oral daily  Cyanocobalamin 100 mcg Tablet 100 MICROGram(s) 1 Tablet(s) Oral daily  folic acid 1 milliGRAM(s) Oral daily  gabapentin 100 milliGRAM(s) Oral daily  melatonin 3 milliGRAM(s) Oral at bedtime  metoprolol tartrate 50 milliGRAM(s) Oral two times a day  pantoprazole    Tablet 40 milliGRAM(s) Oral before breakfast  pyridoxine 100 milliGRAM(s) Oral daily  sodium chloride 0.65% Nasal 1 Spray(s) Both Nostrils four times a day  sodium chloride 0.9% lock flush 3 milliLiter(s) IV Push every 8 hours      PHYSICAL EXAM:  T(C): 36.6 (12-27-19 @ 13:30), Max: 36.8 (12-26-19 @ 21:09)  HR: 63 (12-27-19 @ 18:28) (56 - 98)  BP: 106/67 (12-27-19 @ 18:28) (94/60 - 120/98)  RR: 17 (12-27-19 @ 13:30) (17 - 17)  SpO2: 97% (12-27-19 @ 13:30) (97% - 98%)  Wt(kg): --  I&O's Summary        Appearance: Normal	  HEENT:   Normal oral mucosa, PERRL, EOMI	  Lymphatic: No lymphadenopathy , no edema  Cardiovascular: Normal S1 S2, No JVD, No murmurs , Peripheral pulses palpable 2+ bilaterally  Respiratory: Lungs clear to auscultation, normal effort 	  Gastrointestinal:  Soft, Non-tender, + BS	  Skin: No rashes, No ecchymoses, No cyanosis, warm to touch  Musculoskeletal: Normal range of motion, normal strength  Psychiatry:  Mood & affect appropriate  Ext: No edema      All labs, Imaging and EKGs personally reviewed                           11.7   7.32  )-----------( 165      ( 27 Dec 2019 06:05 )             35.1               12-27    134<L>  |  103  |  11  ----------------------------<  118<H>  4.2   |  19<L>  |  0.74    Ca    8.7      27 Dec 2019 06:05  Phos  3.4     12-27  Mg     2.3     12-27

## 2019-12-27 NOTE — PROGRESS NOTE ADULT - SUBJECTIVE AND OBJECTIVE BOX
Patient is a 74y old  Female who presents with a chief complaint of Chest pain (27 Dec 2019 06:53)      INTERVAL HISTORY: feels ok      MEDICATIONS:  metoprolol tartrate 50 milliGRAM(s) Oral two times a day        PHYSICAL EXAM:  T(C): 36.6 (12-27-19 @ 05:30), Max: 36.8 (12-26-19 @ 21:09)  HR: 56 (12-27-19 @ 05:30) (56 - 62)  BP: 101/58 (12-27-19 @ 05:30) (101/58 - 143/75)  RR: 17 (12-27-19 @ 05:30) (17 - 17)  SpO2: 98% (12-27-19 @ 05:30) (98% - 99%)  Wt(kg): --  I&O's Summary        Appearance: In no distress	  HEENT:    PERRL, EOMI	  Cardiovascular:  S1 S2, No JVD  Respiratory: Lungs clear to auscultation	  Gastrointestinal:  Soft, Non-tender, + BS	  Extremities:  No edema of LE                                11.7   7.32  )-----------( 165      ( 27 Dec 2019 06:05 )             35.1     12-27    134<L>  |  103  |  11  ----------------------------<  118<H>  4.2   |  19<L>  |  0.74    Ca    8.7      27 Dec 2019 06:05  Phos  3.4     12-27  Mg     2.3     12-27          Labs personally reviewed      Assessment /Plan:   MORENO/SOB - cath with patent stents   TTE with Peak LV outflow tract gradient equals 231 mm Hg, mean gradient is equal to 89 mm Hg, this may very well explain her symptoms of MORENO  MRI same as echo  d/c disopyramide as QTc now 500ms  CTS consulted for myectomy  If not candidate for surgical myectomy will consider cryoablation  CHAR per HF attg to rule out subaortic membrane         Harry Christianson DO [facility]  Cardiovascular Medicine  [address] [place] [address], [address]  Office: [phone]  Cell: [phone]

## 2019-12-27 NOTE — DIETITIAN INITIAL EVALUATION ADULT. - PERTINENT LABORATORY DATA
(12/27) Na 134 L, Glu 118 H;           (12/21) HDL 43 L, HbA1c 6.3% H;             (12/20) Albumin 4.2

## 2019-12-27 NOTE — PROGRESS NOTE ADULT - SUBJECTIVE AND OBJECTIVE BOX
[facility] - Manda / Blanca CALABRESE /Nicolas/ BHARATI Armstrong/ BHARATI Franco/ Toño Rosenberg / BEN Njeru  ---------------------------------------------------------------------------------------------------------------    Patient seen and examined bedside    Subjective and Objective: No overnight events, denied sob. No complaints today. feeling better    Allergies: sulfa drugs (Swelling)      Hospital Medications:   MEDICATIONS  (STANDING):  aspirin enteric coated 81 milliGRAM(s) Oral daily  atorvastatin 40 milliGRAM(s) Oral at bedtime  clopidogrel Tablet 75 milliGRAM(s) Oral daily  Cyanocobalamin 100 mcg Tablet 100 MICROGram(s) 1 Tablet(s) Oral daily  folic acid 1 milliGRAM(s) Oral daily  gabapentin 100 milliGRAM(s) Oral daily  melatonin 3 milliGRAM(s) Oral at bedtime  metoprolol tartrate 50 milliGRAM(s) Oral two times a day  pantoprazole    Tablet 40 milliGRAM(s) Oral before breakfast  pyridoxine 100 milliGRAM(s) Oral daily  sodium chloride 0.65% Nasal 1 Spray(s) Both Nostrils four times a day  sodium chloride 0.9% lock flush 3 milliLiter(s) IV Push every 8 hours      VITALS:  T(F): 97.8 (19 @ 05:30), Max: 98.3 (19 @ 21:09)  HR: 56 (19 @ 05:30)  BP: 101/58 (19 @ 05:30)  RR: 17 (19 @ 05:30)  SpO2: 98% (19 @ 05:30)  Wt(kg): --        PHYSICAL EXAM:  Constitutional: NAD  HEENT: anicteric sclera, oropharynx clear  Neck: No JVD  Respiratory: CTAB, no wheezes, rales or rhonchi  Cardiovascular: S1, S2, RRR  Gastrointestinal: BS+, soft, NT/ND  Extremities: No cyanosis or clubbing. No peripheral edema  Neurological: A/O x 3, no focal deficits  Psychiatric: Normal mood, normal affect  : No ortiz.     LABS:      134<L>  |  103  |  11  ----------------------------<  118<H>  4.2   |  19<L>  |  0.74    Ca    8.7      27 Dec 2019 06:05  Phos  3.4       Mg     2.3           Creatinine Trend: 0.74 <--, 0.77 <--, 0.75 <--, 0.72 <--, 0.64 <--, 0.63 <--, 0.64 <--, 0.70 <--                        11.7   7.32  )-----------( 165      ( 27 Dec 2019 06:05 )             35.1     Urine Studies:  Urinalysis Basic - ( 22 Dec 2019 01:15 )    Color: LIGHT YELLOW / Appearance: CLEAR / S.012 / pH: 6.0  Gluc: NEGATIVE / Ketone: NEGATIVE  / Bili: NEGATIVE / Urobili: NORMAL   Blood: NEGATIVE / Protein: NEGATIVE / Nitrite: NEGATIVE   Leuk Esterase: MODERATE / RBC: 0-2 / WBC 11-25   Sq Epi: FEW / Non Sq Epi:  / Bacteria: MODERATE      Osmolality, Random Urine: 292 mosmo/kg ( @ 13:00)      RADIOLOGY & ADDITIONAL STUDIES: [facility] - Manda / Blanca S /Nicolas/ BHARATI Armstrong/ BHARATI Franco/ Toño Rosenberg / BEN Njeru  ---------------------------------------------------------------------------------------------------------------    Patient seen and examined bedside    Subjective and Objective: No overnight events, denied sob/pain/N/V/D. No complaints today.     Allergies: sulfa drugs (Swelling)      Hospital Medications:   MEDICATIONS  (STANDING):  aspirin enteric coated 81 milliGRAM(s) Oral daily  atorvastatin 40 milliGRAM(s) Oral at bedtime  clopidogrel Tablet 75 milliGRAM(s) Oral daily  Cyanocobalamin 100 mcg Tablet 100 MICROGram(s) 1 Tablet(s) Oral daily  folic acid 1 milliGRAM(s) Oral daily  gabapentin 100 milliGRAM(s) Oral daily  melatonin 3 milliGRAM(s) Oral at bedtime  metoprolol tartrate 50 milliGRAM(s) Oral two times a day  pantoprazole    Tablet 40 milliGRAM(s) Oral before breakfast  pyridoxine 100 milliGRAM(s) Oral daily  sodium chloride 0.65% Nasal 1 Spray(s) Both Nostrils four times a day  sodium chloride 0.9% lock flush 3 milliLiter(s) IV Push every 8 hours      VITALS:  T(F): 97.8 (19 @ 05:30), Max: 98.3 (19 @ 21:09)  HR: 56 (19 @ 05:30)  BP: 101/58 (19 @ 05:30)  RR: 17 (19 @ 05:30)  SpO2: 98% (19 @ 05:30)  Wt(kg): --        PHYSICAL EXAM:  Constitutional: NAD  HEENT: anicteric sclera, oropharynx clear  Neck: No JVD  Respiratory: CTAB, no wheezes, rales or rhonchi  Cardiovascular: S1, S2, RRR  Gastrointestinal: BS+, soft, NT/ND  Extremities: No cyanosis or clubbing. No peripheral edema  Neurological: A/O x 3, no focal deficits  Psychiatric: Normal mood, normal affect  : No ortiz.     LABS:      134<L>  |  103  |  11  ----------------------------<  118<H>  4.2   |  19<L>  |  0.74    Ca    8.7      27 Dec 2019 06:05  Phos  3.4       Mg     2.3           Creatinine Trend: 0.74 <--, 0.77 <--, 0.75 <--, 0.72 <--, 0.64 <--, 0.63 <--, 0.64 <--, 0.70 <--                        11.7   7.32  )-----------( 165      ( 27 Dec 2019 06:05 )             35.1     Urine Studies:  Urinalysis Basic - ( 22 Dec 2019 01:15 )    Color: LIGHT YELLOW / Appearance: CLEAR / S.012 / pH: 6.0  Gluc: NEGATIVE / Ketone: NEGATIVE  / Bili: NEGATIVE / Urobili: NORMAL   Blood: NEGATIVE / Protein: NEGATIVE / Nitrite: NEGATIVE   Leuk Esterase: MODERATE / RBC: 0-2 / WBC 11-25   Sq Epi: FEW / Non Sq Epi:  / Bacteria: MODERATE      Osmolality, Random Urine: 292 mosmo/kg ( @ 13:00)      RADIOLOGY & ADDITIONAL STUDIES:

## 2019-12-27 NOTE — PROGRESS NOTE ADULT - SUBJECTIVE AND OBJECTIVE BOX
Patient seen and examined. Spent > 1 hour speaking to patient (using  phone and speaking to patients grandson.   States she has been walking, and observed pt walking today, denies CP or MORENO.  No SOB at rest, dizziness.     Medications:  aspirin enteric coated 81 milliGRAM(s) Oral daily  atorvastatin 40 milliGRAM(s) Oral at bedtime  clopidogrel Tablet 75 milliGRAM(s) Oral daily  Cyanocobalamin 100 mcg Tablet 100 MICROGram(s) 1 Tablet(s) Oral daily  folic acid 1 milliGRAM(s) Oral daily  gabapentin 100 milliGRAM(s) Oral daily  guaiFENesin   Syrup  (Sugar-Free) 100 milliGRAM(s) Oral every 6 hours PRN  melatonin 3 milliGRAM(s) Oral at bedtime  metoprolol tartrate 50 milliGRAM(s) Oral two times a day  pantoprazole    Tablet 40 milliGRAM(s) Oral before breakfast  pyridoxine 100 milliGRAM(s) Oral daily  sodium chloride 0.65% Nasal 1 Spray(s) Both Nostrils four times a day  sodium chloride 0.9% lock flush 3 milliLiter(s) IV Push every 8 hours      Vitals:  Vital Signs Last 24 Hrs  T(C): 36.6 (27 Dec 2019 13:30), Max: 36.8 (26 Dec 2019 21:09)  T(F): 97.8 (27 Dec 2019 13:30), Max: 98.3 (26 Dec 2019 21:09)  HR: 63 (27 Dec 2019 18:28) (56 - 98)  BP: 106/67 (27 Dec 2019 18:28) (94/60 - 120/98)  BP(mean): --  RR: 17 (27 Dec 2019 13:30) (17 - 17)  SpO2: 97% (27 Dec 2019 13:30) (97% - 98%)    Daily     Daily Weight in k.8 (27 Dec 2019 10:35)    I&O's Detail      Physical Exam:     General: No distress. Comfortable.  HEENT: EOM intact.  Neck: Neck supple. JVP not elevated. No masses  Chest: Clear to auscultation bilaterally  CV: Normal S1 and S2. No murmurs, rub, or gallops. Radial pulses normal. No LE edema and is warm b/l.   Abdomen: Soft, non-distended, non-tender  Skin: No rashes or skin breakdown  Neurology: Alert and oriented times three. Sensation intact  Psych: Affect normal    Labs:                        11.7   7.32  )-----------( 165      ( 27 Dec 2019 06:05 )             35.1         134<L>  |  103  |  11  ----------------------------<  118<H>  4.2   |  19<L>  |  0.74    Ca    8.7      27 Dec 2019 06:05  Phos  3.4       Mg     2.3

## 2019-12-27 NOTE — PROGRESS NOTE ADULT - SUBJECTIVE AND OBJECTIVE BOX
LAVERNE GUADALUPEGO:[id number],   74yFemale followed for:  sulfa drugs (Swelling)    PAST MEDICAL & SURGICAL HISTORY:  Left ventricular outflow tract obstruction  Spinal stenosis  HTN (hypertension)  History of back surgery  Status post LASIK surgery of both eyes    FAMILY HISTORY:  Family history of coronary artery disease in brother: Brother passed away from CAD at age 60    MEDICATIONS  (STANDING):  aspirin enteric coated 81 milliGRAM(s) Oral daily  atorvastatin 40 milliGRAM(s) Oral at bedtime  clopidogrel Tablet 75 milliGRAM(s) Oral daily  Cyanocobalamin 100 mcg Tablet 100 MICROGram(s) 1 Tablet(s) Oral daily  folic acid 1 milliGRAM(s) Oral daily  gabapentin 100 milliGRAM(s) Oral daily  melatonin 3 milliGRAM(s) Oral at bedtime  metoprolol tartrate 50 milliGRAM(s) Oral two times a day  pantoprazole    Tablet 40 milliGRAM(s) Oral before breakfast  pyridoxine 100 milliGRAM(s) Oral daily  sodium chloride 0.65% Nasal 1 Spray(s) Both Nostrils four times a day  sodium chloride 0.9% lock flush 3 milliLiter(s) IV Push every 8 hours    MEDICATIONS  (PRN):  guaiFENesin   Syrup  (Sugar-Free) 100 milliGRAM(s) Oral every 6 hours PRN Cough      Vital Signs Last 24 Hrs  T(C): 36.6 (27 Dec 2019 05:30), Max: 36.8 (26 Dec 2019 21:09)  T(F): 97.8 (27 Dec 2019 05:30), Max: 98.3 (26 Dec 2019 21:09)  HR: 56 (27 Dec 2019 05:30) (56 - 62)  BP: 101/58 (27 Dec 2019 05:30) (101/58 - 143/75)  BP(mean): --  RR: 17 (27 Dec 2019 05:30) (17 - 17)  SpO2: 98% (27 Dec 2019 05:30) (98% - 99%)  nc/at  s1s2  cta  soft, nt, nd no guarding or rebound  no c/c/e    CBC Full  -  ( 26 Dec 2019 06:15 )  WBC Count : 6.01 K/uL  RBC Count : 3.75 M/uL  Hemoglobin : 12.1 g/dL  Hematocrit : 35.7 %  Platelet Count - Automated : 143 K/uL  Mean Cell Volume : 95.2 fL  Mean Cell Hemoglobin : 32.3 pg  Mean Cell Hemoglobin Concentration : 33.9 %  Auto Neutrophil # : x  Auto Lymphocyte # : x  Auto Monocyte # : x  Auto Eosinophil # : x  Auto Basophil # : x  Auto Neutrophil % : x  Auto Lymphocyte % : x  Auto Monocyte % : x  Auto Eosinophil % : x  Auto Basophil % : x    12-26    134<L>  |  102  |  11  ----------------------------<  130<H>  4.0   |  22  |  0.77    Ca    9.1      26 Dec 2019 06:15  Phos  3.9     12-26  Mg     2.3     12-26

## 2019-12-28 LAB
ANION GAP SERPL CALC-SCNC: 11 MMO/L — SIGNIFICANT CHANGE UP (ref 7–14)
BUN SERPL-MCNC: 15 MG/DL — SIGNIFICANT CHANGE UP (ref 7–23)
CALCIUM SERPL-MCNC: 8.9 MG/DL — SIGNIFICANT CHANGE UP (ref 8.4–10.5)
CHLORIDE SERPL-SCNC: 102 MMOL/L — SIGNIFICANT CHANGE UP (ref 98–107)
CO2 SERPL-SCNC: 21 MMOL/L — LOW (ref 22–31)
CREAT SERPL-MCNC: 0.74 MG/DL — SIGNIFICANT CHANGE UP (ref 0.5–1.3)
GLUCOSE SERPL-MCNC: 114 MG/DL — HIGH (ref 70–99)
HCT VFR BLD CALC: 34.7 % — SIGNIFICANT CHANGE UP (ref 34.5–45)
HGB BLD-MCNC: 11.7 G/DL — SIGNIFICANT CHANGE UP (ref 11.5–15.5)
MAGNESIUM SERPL-MCNC: 2.3 MG/DL — SIGNIFICANT CHANGE UP (ref 1.6–2.6)
MCHC RBC-ENTMCNC: 32.1 PG — SIGNIFICANT CHANGE UP (ref 27–34)
MCHC RBC-ENTMCNC: 33.7 % — SIGNIFICANT CHANGE UP (ref 32–36)
MCV RBC AUTO: 95.3 FL — SIGNIFICANT CHANGE UP (ref 80–100)
NRBC # FLD: 0 K/UL — SIGNIFICANT CHANGE UP (ref 0–0)
PHOSPHATE SERPL-MCNC: 3.2 MG/DL — SIGNIFICANT CHANGE UP (ref 2.5–4.5)
PLATELET # BLD AUTO: 165 K/UL — SIGNIFICANT CHANGE UP (ref 150–400)
PMV BLD: 10.9 FL — SIGNIFICANT CHANGE UP (ref 7–13)
POTASSIUM SERPL-MCNC: 4.1 MMOL/L — SIGNIFICANT CHANGE UP (ref 3.5–5.3)
POTASSIUM SERPL-SCNC: 4.1 MMOL/L — SIGNIFICANT CHANGE UP (ref 3.5–5.3)
RBC # BLD: 3.64 M/UL — LOW (ref 3.8–5.2)
RBC # FLD: 13 % — SIGNIFICANT CHANGE UP (ref 10.3–14.5)
SODIUM SERPL-SCNC: 134 MMOL/L — LOW (ref 135–145)
WBC # BLD: 6.86 K/UL — SIGNIFICANT CHANGE UP (ref 3.8–10.5)
WBC # FLD AUTO: 6.86 K/UL — SIGNIFICANT CHANGE UP (ref 3.8–10.5)

## 2019-12-28 RX ORDER — METOPROLOL TARTRATE 50 MG
25 TABLET ORAL
Refills: 0 | Status: DISCONTINUED | OUTPATIENT
Start: 2019-12-28 | End: 2019-12-30

## 2019-12-28 RX ADMIN — Medication 1 SPRAY(S): at 13:10

## 2019-12-28 RX ADMIN — Medication 3 MILLIGRAM(S): at 22:41

## 2019-12-28 RX ADMIN — SODIUM CHLORIDE 3 MILLILITER(S): 9 INJECTION INTRAMUSCULAR; INTRAVENOUS; SUBCUTANEOUS at 17:06

## 2019-12-28 RX ADMIN — ATORVASTATIN CALCIUM 40 MILLIGRAM(S): 80 TABLET, FILM COATED ORAL at 22:41

## 2019-12-28 RX ADMIN — Medication 100 MILLIGRAM(S): at 05:39

## 2019-12-28 RX ADMIN — PANTOPRAZOLE SODIUM 40 MILLIGRAM(S): 20 TABLET, DELAYED RELEASE ORAL at 05:41

## 2019-12-28 RX ADMIN — CLOPIDOGREL BISULFATE 75 MILLIGRAM(S): 75 TABLET, FILM COATED ORAL at 13:10

## 2019-12-28 RX ADMIN — Medication 1 SPRAY(S): at 05:40

## 2019-12-28 RX ADMIN — GABAPENTIN 100 MILLIGRAM(S): 400 CAPSULE ORAL at 13:09

## 2019-12-28 RX ADMIN — SODIUM CHLORIDE 3 MILLILITER(S): 9 INJECTION INTRAMUSCULAR; INTRAVENOUS; SUBCUTANEOUS at 05:37

## 2019-12-28 RX ADMIN — Medication 1 MILLIGRAM(S): at 13:10

## 2019-12-28 RX ADMIN — Medication 100 MILLIGRAM(S): at 13:10

## 2019-12-28 RX ADMIN — Medication 81 MILLIGRAM(S): at 13:10

## 2019-12-28 NOTE — PROGRESS NOTE ADULT - SUBJECTIVE AND OBJECTIVE BOX
[facility]    Subjective: Patient seen and examined. No new events except as noted.   doing well  HR low overnight assymptomatc     REVIEW OF SYSTEMS:    CONSTITUTIONAL: No weakness, fevers or chills  EYES/ENT: No visual changes;  No vertigo or throat pain   NECK: No pain or stiffness  RESPIRATORY: No cough, wheezing, hemoptysis; No shortness of breath  CARDIOVASCULAR: No chest pain or palpitations  GASTROINTESTINAL: No abdominal or epigastric pain. No nausea, vomiting, or hematemesis; No diarrhea or constipation. No melena or hematochezia.  GENITOURINARY: No dysuria, frequency or hematuria  NEUROLOGICAL: No numbness or weakness  SKIN: No itching, burning, rashes, or lesions   All other review of systems is negative unless indicated above.    MEDICATIONS:  MEDICATIONS  (STANDING):  aspirin enteric coated 81 milliGRAM(s) Oral daily  atorvastatin 40 milliGRAM(s) Oral at bedtime  clopidogrel Tablet 75 milliGRAM(s) Oral daily  Cyanocobalamin 100 mcg Tablet 100 MICROGram(s) 1 Tablet(s) Oral daily  folic acid 1 milliGRAM(s) Oral daily  gabapentin 100 milliGRAM(s) Oral daily  melatonin 3 milliGRAM(s) Oral at bedtime  metoprolol tartrate 25 milliGRAM(s) Oral two times a day  pantoprazole    Tablet 40 milliGRAM(s) Oral before breakfast  pyridoxine 100 milliGRAM(s) Oral daily  sodium chloride 0.65% Nasal 1 Spray(s) Both Nostrils four times a day  sodium chloride 0.9% lock flush 3 milliLiter(s) IV Push every 8 hours      PHYSICAL EXAM:  T(C): 36.7 (12-28-19 @ 12:42), Max: 36.8 (12-27-19 @ 21:51)  HR: 56 (12-28-19 @ 12:42) (42 - 98)  BP: 117/62 (12-28-19 @ 12:42) (88/55 - 127/76)  RR: 17 (12-28-19 @ 12:42) (17 - 18)  SpO2: 100% (12-28-19 @ 12:42) (95% - 100%)  Wt(kg): --  I&O's Summary        Appearance: Normal	  HEENT:   Normal oral mucosa, PERRL, EOMI	  Lymphatic: No lymphadenopathy , no edema  Cardiovascular: Normal S1 S2, No JVD, No murmurs , Peripheral pulses palpable 2+ bilaterally  Respiratory: Lungs clear to auscultation, normal effort 	  Gastrointestinal:  Soft, Non-tender, + BS	  Skin: No rashes, No ecchymoses, No cyanosis, warm to touch  Musculoskeletal: Normal range of motion, normal strength  Psychiatry:  Mood & affect appropriate  Ext: No edema      All labs, Imaging and EKGs personally reviewed                           11.7   6.86  )-----------( 165      ( 28 Dec 2019 04:50 )             34.7               12-28    134<L>  |  102  |  15  ----------------------------<  114<H>  4.1   |  21<L>  |  0.74    Ca    8.9      28 Dec 2019 04:50  Phos  3.2     12-28  Mg     2.3     12-28

## 2019-12-28 NOTE — PROGRESS NOTE ADULT - SUBJECTIVE AND OBJECTIVE BOX
Follow-up Pulm Progress Note    No new respiratory events overnight.  Denies SOB/CP.     Medications:  MEDICATIONS  (STANDING):  aspirin enteric coated 81 milliGRAM(s) Oral daily  atorvastatin 40 milliGRAM(s) Oral at bedtime  clopidogrel Tablet 75 milliGRAM(s) Oral daily  Cyanocobalamin 100 mcg Tablet 100 MICROGram(s) 1 Tablet(s) Oral daily  folic acid 1 milliGRAM(s) Oral daily  gabapentin 100 milliGRAM(s) Oral daily  melatonin 3 milliGRAM(s) Oral at bedtime  metoprolol tartrate 25 milliGRAM(s) Oral two times a day  pantoprazole    Tablet 40 milliGRAM(s) Oral before breakfast  pyridoxine 100 milliGRAM(s) Oral daily  sodium chloride 0.65% Nasal 1 Spray(s) Both Nostrils four times a day  sodium chloride 0.9% lock flush 3 milliLiter(s) IV Push every 8 hours    MEDICATIONS  (PRN):  guaiFENesin   Syrup  (Sugar-Free) 100 milliGRAM(s) Oral every 6 hours PRN Cough    Vital Signs Last 24 Hrs  T(C): 36.6 (28 Dec 2019 08:30), Max: 36.8 (27 Dec 2019 21:51)  T(F): 97.9 (28 Dec 2019 08:30), Max: 98.3 (27 Dec 2019 21:51)  HR: 65 (28 Dec 2019 08:30) (42 - 98)  BP: 88/55 (28 Dec 2019 08:30) (88/55 - 127/76)  BP(mean): --  RR: 18 (28 Dec 2019 08:30) (17 - 18)  SpO2: 100% (28 Dec 2019 08:30) (95% - 100%)    LABS:                        11.7   6.86  )-----------( 165      ( 28 Dec 2019 04:50 )             34.7     12-28    134<L>  |  102  |  15  ----------------------------<  114<H>  4.1   |  21<L>  |  0.74    Ca    8.9      28 Dec 2019 04:50  Phos  3.2     12-28  Mg     2.3     12-28    CAPILLARY BLOOD GLUCOSE      CULTURES: (if applicable)      Culture - Urine (collected 12-24-19 @ 06:22)  Source: URINE MIDSTREAM  Final Report (12-25-19 @ 14:08):    Culture grew 3 or more types of organisms which indicate    collection contamination; consider recollection only if    clinically indicated.    Physical Examination:  PULM: Clear to auscultation bilaterally, no significant sputum production  CVS: S1, S2 heard, III/VI systolic murmur heard    RADIOLOGY REVIEWED  CXR:     CT chest:    TTE:

## 2019-12-28 NOTE — CHART NOTE - NSCHARTNOTEFT_GEN_A_CORE
Patient's BP 90/50, HR in 40s during theearly morning and she was asymptomatic.  previous evening HR was 44.  will change Metoprolol to 25mg BID.  Patient has occasionally goes up to 130s during the day.  PMD made aware.  Stefany MALDONADO Patient's BP 90/50, HR in 40s during theearly morning and she was asymptomatic.  previous evening HR was 44.  will change Metoprolol to 25mg BID.  Patient has occasionally goes up to 130s during the day.  PMD made aware.  Stefany MALDONADO      Attending:   discussed with cardiology  will cont lopressor 50 BID   benefit to keep HR on the low side   monitor HR

## 2019-12-28 NOTE — PROGRESS NOTE ADULT - SUBJECTIVE AND OBJECTIVE BOX
NEPHROLOGY MEDICAL CARE, [facility] - Dr. Alfonso Martin/ Dr. Kerri Wilson/ Dr. Morgan De La O/ Dr. Soha Rizo    Patient was seen and examined at bedside.    CC: patient has no sob and doing well.    Vital Signs Last 24 Hrs  T(C): 36.6 (28 Dec 2019 08:30), Max: 36.8 (27 Dec 2019 21:51)  T(F): 97.9 (28 Dec 2019 08:30), Max: 98.3 (27 Dec 2019 21:51)  HR: 65 (28 Dec 2019 08:30) (42 - 98)  BP: 88/55 (28 Dec 2019 08:30) (88/55 - 127/76)  BP(mean): --  RR: 18 (28 Dec 2019 08:30) (17 - 18)  SpO2: 100% (28 Dec 2019 08:30) (95% - 100%)      PHYSICAL EXAM:  GENERAL: No acute respiratory distress.  HEAD:  Atraumatic, Normocephalic  EYES: conjunctiva and sclera clear  ENMT: Moist mucous membranes  NECK: Supple, No JVD  NERVOUS SYSTEM:  Awake, Alert & Oriented X3,   CHEST/LUNG: Clear to percussion bilaterally; No rales, rhonchi, wheezing  HEART: Regular rate and rhythm; No murmurs, rubs, or gallops  ABDOMEN: Soft, Non-tender, Nondistended; Bowel sounds present  EXTREMITIES:  No cyanosis, or edema  SKIN: No rashes or lesions        MEDICATIONS:  aspirin enteric coated 81 milliGRAM(s) Oral daily  atorvastatin 40 milliGRAM(s) Oral at bedtime  clopidogrel Tablet 75 milliGRAM(s) Oral daily  Cyanocobalamin 100 mcg Tablet 100 MICROGram(s) 1 Tablet(s) Oral daily  folic acid 1 milliGRAM(s) Oral daily  gabapentin 100 milliGRAM(s) Oral daily  guaiFENesin   Syrup  (Sugar-Free) 100 milliGRAM(s) Oral every 6 hours PRN  melatonin 3 milliGRAM(s) Oral at bedtime  metoprolol tartrate 25 milliGRAM(s) Oral two times a day  pantoprazole    Tablet 40 milliGRAM(s) Oral before breakfast  pyridoxine 100 milliGRAM(s) Oral daily  sodium chloride 0.65% Nasal 1 Spray(s) Both Nostrils four times a day  sodium chloride 0.9% lock flush 3 milliLiter(s) IV Push every 8 hours      LABS:                        11.7   6.86  )-----------( 165      ( 28 Dec 2019 04:50 )             34.7     12-28    134<L>  |  102  |  15  ----------------------------<  114<H>  4.1   |  21<L>  |  0.74    Ca    8.9      28 Dec 2019 04:50  Phos  3.2     12-28  Mg     2.3     12-28          Magnesium, Serum: 2.3 mg/dL (12-28 @ 04:50)  Phosphorus Level, Serum: 3.2 mg/dL (12-28 @ 04:50)    Urine studies    PTH and Vit D:

## 2019-12-29 RX ADMIN — Medication 81 MILLIGRAM(S): at 13:15

## 2019-12-29 RX ADMIN — Medication 3 MILLIGRAM(S): at 21:11

## 2019-12-29 RX ADMIN — Medication 100 MILLIGRAM(S): at 13:16

## 2019-12-29 RX ADMIN — SODIUM CHLORIDE 3 MILLILITER(S): 9 INJECTION INTRAMUSCULAR; INTRAVENOUS; SUBCUTANEOUS at 21:11

## 2019-12-29 RX ADMIN — SODIUM CHLORIDE 3 MILLILITER(S): 9 INJECTION INTRAMUSCULAR; INTRAVENOUS; SUBCUTANEOUS at 00:01

## 2019-12-29 RX ADMIN — PANTOPRAZOLE SODIUM 40 MILLIGRAM(S): 20 TABLET, DELAYED RELEASE ORAL at 06:34

## 2019-12-29 RX ADMIN — Medication 25 MILLIGRAM(S): at 17:25

## 2019-12-29 RX ADMIN — Medication 1 SPRAY(S): at 13:15

## 2019-12-29 RX ADMIN — CLOPIDOGREL BISULFATE 75 MILLIGRAM(S): 75 TABLET, FILM COATED ORAL at 13:15

## 2019-12-29 RX ADMIN — ATORVASTATIN CALCIUM 40 MILLIGRAM(S): 80 TABLET, FILM COATED ORAL at 21:11

## 2019-12-29 RX ADMIN — SODIUM CHLORIDE 3 MILLILITER(S): 9 INJECTION INTRAMUSCULAR; INTRAVENOUS; SUBCUTANEOUS at 17:25

## 2019-12-29 RX ADMIN — GABAPENTIN 100 MILLIGRAM(S): 400 CAPSULE ORAL at 13:16

## 2019-12-29 RX ADMIN — SODIUM CHLORIDE 3 MILLILITER(S): 9 INJECTION INTRAMUSCULAR; INTRAVENOUS; SUBCUTANEOUS at 06:24

## 2019-12-29 RX ADMIN — Medication 1 MILLIGRAM(S): at 13:15

## 2019-12-29 NOTE — PROGRESS NOTE ADULT - SUBJECTIVE AND OBJECTIVE BOX
Patient is a 74y old  Female who presents with a chief complaint of Chest pain (29 Dec 2019 11:33)      INTERVAL HISTORY:  feels ok    	  MEDICATIONS:  metoprolol tartrate 25 milliGRAM(s) Oral two times a day        PHYSICAL EXAM:  T(C): 36.6 (12-29-19 @ 21:08), Max: 36.8 (12-29-19 @ 13:15)  HR: 60 (12-29-19 @ 21:08) (47 - 66)  BP: 135/62 (12-29-19 @ 21:08) (111/63 - 144/69)  RR: 17 (12-29-19 @ 21:08) (17 - 18)  SpO2: 99% (12-29-19 @ 21:08) (99% - 100%)  Wt(kg): --  I&O's Summary        Appearance: In no distress	  HEENT:    PERRL, EOMI	  Cardiovascular:  S1 S2, No JVD  Respiratory: Lungs clear to auscultation	  Gastrointestinal:  Soft, Non-tender, + BS	  Extremities:  No edema of LE                                11.7   6.86  )-----------( 165      ( 28 Dec 2019 04:50 )             34.7     12-28    134<L>  |  102  |  15  ----------------------------<  114<H>  4.1   |  21<L>  |  0.74    Ca    8.9      28 Dec 2019 04:50  Phos  3.2     12-28  Mg     2.3     12-28          Labs personally reviewed    Assessment /Plan:   MORENO/SOB - cath with patent stents   TTE with Peak LV outflow tract gradient equals 231 mm Hg, mean gradient is equal to 89 mm Hg, this may very well explain her symptoms of MORENO  MRI same as echo  d/c disopyramide as QTc now 500ms  CTS consulted for myectomy  Does not appear to be good candidate for surgical myectomy given only 1.4cm septum, will consider cryoablation as outpt          Harry Christianson DO [facility]  Cardiovascular Medicine  [address] [place] [address], [address]  Office: [phone]  Cell: [phone]

## 2019-12-29 NOTE — PROGRESS NOTE ADULT - SUBJECTIVE AND OBJECTIVE BOX
[facility]    Subjective: Patient seen and examined. No new events except as noted.   doing okay    REVIEW OF SYSTEMS:    CONSTITUTIONAL: No weakness, fevers or chills  EYES/ENT: No visual changes;  No vertigo or throat pain   NECK: No pain or stiffness  RESPIRATORY: No cough, wheezing, hemoptysis; No shortness of breath  CARDIOVASCULAR: No chest pain or palpitations  GASTROINTESTINAL: No abdominal or epigastric pain. No nausea, vomiting, or hematemesis; No diarrhea or constipation. No melena or hematochezia.  GENITOURINARY: No dysuria, frequency or hematuria  NEUROLOGICAL: No numbness or weakness  SKIN: No itching, burning, rashes, or lesions   All other review of systems is negative unless indicated above.    MEDICATIONS:  MEDICATIONS  (STANDING):  aspirin enteric coated 81 milliGRAM(s) Oral daily  atorvastatin 40 milliGRAM(s) Oral at bedtime  clopidogrel Tablet 75 milliGRAM(s) Oral daily  Cyanocobalamin 100 mcg Tablet 100 MICROGram(s) 1 Tablet(s) Oral daily  folic acid 1 milliGRAM(s) Oral daily  gabapentin 100 milliGRAM(s) Oral daily  melatonin 3 milliGRAM(s) Oral at bedtime  metoprolol tartrate 25 milliGRAM(s) Oral two times a day  pantoprazole    Tablet 40 milliGRAM(s) Oral before breakfast  pyridoxine 100 milliGRAM(s) Oral daily  sodium chloride 0.65% Nasal 1 Spray(s) Both Nostrils four times a day  sodium chloride 0.9% lock flush 3 milliLiter(s) IV Push every 8 hours      PHYSICAL EXAM:  T(C): 36.6 (12-29-19 @ 21:08), Max: 36.8 (12-29-19 @ 13:15)  HR: 60 (12-29-19 @ 21:08) (47 - 66)  BP: 135/62 (12-29-19 @ 21:08) (111/63 - 144/69)  RR: 17 (12-29-19 @ 21:08) (17 - 18)  SpO2: 99% (12-29-19 @ 21:08) (99% - 100%)  Wt(kg): --  I&O's Summary        Appearance: Normal	  HEENT:   Normal oral mucosa, PERRL, EOMI	  Lymphatic: No lymphadenopathy , no edema  Cardiovascular: Normal S1 S2, No JVD, No murmurs , Peripheral pulses palpable 2+ bilaterally  Respiratory: Lungs clear to auscultation, normal effort 	  Gastrointestinal:  Soft, Non-tender, + BS	  Skin: No rashes, No ecchymoses, No cyanosis, warm to touch  Musculoskeletal: Normal range of motion, normal strength  Psychiatry:  Mood & affect appropriate  Ext: No edema      All labs, Imaging and EKGs personally reviewed                           11.7   6.86  )-----------( 165      ( 28 Dec 2019 04:50 )             34.7               12-28    134<L>  |  102  |  15  ----------------------------<  114<H>  4.1   |  21<L>  |  0.74    Ca    8.9      28 Dec 2019 04:50  Phos  3.2     12-28  Mg     2.3     12-28

## 2019-12-30 LAB
ANION GAP SERPL CALC-SCNC: 12 MMO/L — SIGNIFICANT CHANGE UP (ref 7–14)
BUN SERPL-MCNC: 12 MG/DL — SIGNIFICANT CHANGE UP (ref 7–23)
CALCIUM SERPL-MCNC: 9.1 MG/DL — SIGNIFICANT CHANGE UP (ref 8.4–10.5)
CHLORIDE SERPL-SCNC: 103 MMOL/L — SIGNIFICANT CHANGE UP (ref 98–107)
CO2 SERPL-SCNC: 21 MMOL/L — LOW (ref 22–31)
CREAT SERPL-MCNC: 0.63 MG/DL — SIGNIFICANT CHANGE UP (ref 0.5–1.3)
GLUCOSE SERPL-MCNC: 98 MG/DL — SIGNIFICANT CHANGE UP (ref 70–99)
HCT VFR BLD CALC: 36.2 % — SIGNIFICANT CHANGE UP (ref 34.5–45)
HGB BLD-MCNC: 11.8 G/DL — SIGNIFICANT CHANGE UP (ref 11.5–15.5)
MCHC RBC-ENTMCNC: 31.4 PG — SIGNIFICANT CHANGE UP (ref 27–34)
MCHC RBC-ENTMCNC: 32.6 % — SIGNIFICANT CHANGE UP (ref 32–36)
MCV RBC AUTO: 96.3 FL — SIGNIFICANT CHANGE UP (ref 80–100)
NRBC # FLD: 0 K/UL — SIGNIFICANT CHANGE UP (ref 0–0)
PLATELET # BLD AUTO: 175 K/UL — SIGNIFICANT CHANGE UP (ref 150–400)
PMV BLD: 11.5 FL — SIGNIFICANT CHANGE UP (ref 7–13)
POTASSIUM SERPL-MCNC: 4.3 MMOL/L — SIGNIFICANT CHANGE UP (ref 3.5–5.3)
POTASSIUM SERPL-SCNC: 4.3 MMOL/L — SIGNIFICANT CHANGE UP (ref 3.5–5.3)
RBC # BLD: 3.76 M/UL — LOW (ref 3.8–5.2)
RBC # FLD: 13.1 % — SIGNIFICANT CHANGE UP (ref 10.3–14.5)
SODIUM SERPL-SCNC: 136 MMOL/L — SIGNIFICANT CHANGE UP (ref 135–145)
WBC # BLD: 7.67 K/UL — SIGNIFICANT CHANGE UP (ref 3.8–10.5)
WBC # FLD AUTO: 7.67 K/UL — SIGNIFICANT CHANGE UP (ref 3.8–10.5)

## 2019-12-30 RX ORDER — METOPROLOL TARTRATE 50 MG
25 TABLET ORAL THREE TIMES A DAY
Refills: 0 | Status: DISCONTINUED | OUTPATIENT
Start: 2019-12-30 | End: 2019-12-31

## 2019-12-30 RX ADMIN — SODIUM CHLORIDE 3 MILLILITER(S): 9 INJECTION INTRAMUSCULAR; INTRAVENOUS; SUBCUTANEOUS at 14:03

## 2019-12-30 RX ADMIN — GABAPENTIN 100 MILLIGRAM(S): 400 CAPSULE ORAL at 14:01

## 2019-12-30 RX ADMIN — Medication 1 MILLIGRAM(S): at 14:01

## 2019-12-30 RX ADMIN — Medication 25 MILLIGRAM(S): at 05:06

## 2019-12-30 RX ADMIN — SODIUM CHLORIDE 3 MILLILITER(S): 9 INJECTION INTRAMUSCULAR; INTRAVENOUS; SUBCUTANEOUS at 22:02

## 2019-12-30 RX ADMIN — PANTOPRAZOLE SODIUM 40 MILLIGRAM(S): 20 TABLET, DELAYED RELEASE ORAL at 05:06

## 2019-12-30 RX ADMIN — SODIUM CHLORIDE 3 MILLILITER(S): 9 INJECTION INTRAMUSCULAR; INTRAVENOUS; SUBCUTANEOUS at 05:05

## 2019-12-30 RX ADMIN — Medication 25 MILLIGRAM(S): at 22:02

## 2019-12-30 RX ADMIN — Medication 81 MILLIGRAM(S): at 14:00

## 2019-12-30 RX ADMIN — CLOPIDOGREL BISULFATE 75 MILLIGRAM(S): 75 TABLET, FILM COATED ORAL at 14:00

## 2019-12-30 RX ADMIN — Medication 100 MILLIGRAM(S): at 14:01

## 2019-12-30 RX ADMIN — Medication 3 MILLIGRAM(S): at 22:02

## 2019-12-30 RX ADMIN — Medication 25 MILLIGRAM(S): at 14:01

## 2019-12-30 RX ADMIN — ATORVASTATIN CALCIUM 40 MILLIGRAM(S): 80 TABLET, FILM COATED ORAL at 22:02

## 2019-12-30 NOTE — PROGRESS NOTE ADULT - PROBLEM SELECTOR PLAN 7
Patient currently on Plavix 75 mg daily.
Patient currently on Plavix 75 mg daily.
DVT prophylaixs
Patient currently on Plavix 75 mg daily.

## 2019-12-30 NOTE — PROGRESS NOTE ADULT - NSHPATTENDINGPLANDISCUSS_GEN_ALL_CORE
house staff, card team
house staff, card team
pt
patient and grandson over the phone.
house staff, card team
team
house staff, card team
house staff, card team

## 2019-12-30 NOTE — PROGRESS NOTE ADULT - SUBJECTIVE AND OBJECTIVE BOX
[facility]    Subjective: Patient seen and examined. No new events except as noted.   doing well  D/c planing     REVIEW OF SYSTEMS:    CONSTITUTIONAL: No weakness, fevers or chills  EYES/ENT: No visual changes;  No vertigo or throat pain   NECK: No pain or stiffness  RESPIRATORY: No cough, wheezing, hemoptysis; No shortness of breath  CARDIOVASCULAR: No chest pain or palpitations  GASTROINTESTINAL: No abdominal or epigastric pain. No nausea, vomiting, or hematemesis; No diarrhea or constipation. No melena or hematochezia.  GENITOURINARY: No dysuria, frequency or hematuria  NEUROLOGICAL: No numbness or weakness  SKIN: No itching, burning, rashes, or lesions   All other review of systems is negative unless indicated above.    MEDICATIONS:  MEDICATIONS  (STANDING):  aspirin enteric coated 81 milliGRAM(s) Oral daily  atorvastatin 40 milliGRAM(s) Oral at bedtime  clopidogrel Tablet 75 milliGRAM(s) Oral daily  Cyanocobalamin 100 mcg Tablet 100 MICROGram(s) 1 Tablet(s) Oral daily  folic acid 1 milliGRAM(s) Oral daily  gabapentin 100 milliGRAM(s) Oral daily  melatonin 3 milliGRAM(s) Oral at bedtime  metoprolol tartrate 25 milliGRAM(s) Oral three times a day  pantoprazole    Tablet 40 milliGRAM(s) Oral before breakfast  pyridoxine 100 milliGRAM(s) Oral daily  sodium chloride 0.65% Nasal 1 Spray(s) Both Nostrils four times a day  sodium chloride 0.9% lock flush 3 milliLiter(s) IV Push every 8 hours      PHYSICAL EXAM:  T(C): 36.6 (12-30-19 @ 17:22), Max: 36.8 (12-30-19 @ 05:03)  HR: 60 (12-30-19 @ 17:22) (55 - 70)  BP: 150/58 (12-30-19 @ 17:22) (110/89 - 150/58)  RR: 18 (12-30-19 @ 17:22) (17 - 18)  SpO2: 98% (12-30-19 @ 17:22) (95% - 99%)  Wt(kg): --  I&O's Summary        Appearance: Normal	  HEENT:   Normal oral mucosa, PERRL, EOMI	  Lymphatic: No lymphadenopathy , no edema  Cardiovascular: Normal S1 S2, No JVD, No murmurs , Peripheral pulses palpable 2+ bilaterally  Respiratory: Lungs clear to auscultation, normal effort 	  Gastrointestinal:  Soft, Non-tender, + BS	  Skin: No rashes, No ecchymoses, No cyanosis, warm to touch  Musculoskeletal: Normal range of motion, normal strength  Psychiatry:  Mood & affect appropriate  Ext: No edema      All labs, Imaging and EKGs personally reviewed                           11.8   7.67  )-----------( 175      ( 30 Dec 2019 04:24 )             36.2               12-30    136  |  103  |  12  ----------------------------<  98  4.3   |  21<L>  |  0.63    Ca    9.1      30 Dec 2019 04:24

## 2019-12-30 NOTE — PROGRESS NOTE ADULT - PROBLEM SELECTOR PLAN 1
continue monitoring. Troponin 18->18->17. CKMB index 2.2. Pt s/p cath on 12/11 with stent to mRCA. Currently pain free.   S/P cath   Echo noted, LVOT  HF eval appreciated  CTS surg   awaiting MRI   serial EKGs while on disopyramide, check QTc
Patient has been missing metoprolol tartrate 50 mg BID - due to HR parameters. Will consider lowering parameters to HR <55.   Consider d/c disopyramide.  D/w primary med PA- MRI will be re-attemted today.   CTS consult for myomectomy from Dr. Bell requested, await MRI.
continue monitoring. Troponin 18->18->17. CKMB index 2.2. Pt s/p cath on 12/11 with stent to mRCA. Currently pain free. Echo ordered. Follow up with cardiology if stress test needed.
Na 134, stable. chronic   Off NACL  Tabs  monitor off Nacl,  1000 cc Fluid restriction
continue monitoring. Troponin 18->18->17. CKMB index 2.2. Pt s/p cath on 12/11 with stent to mRCA. Currently pain free.   S/P cath   Echo noted, LVOT  HF eval appreciated  CTS surg   Cardiac  MRI   D/C planing and close FU after discharge with HF team
Na improved to 135  h/o chf, will d/c NACL tabs and monitor serum Na
continue monitoring. Troponin 18->18->17. CKMB index 2.2. Pt s/p cath on 12/11 with stent to mRCA. Currently pain free. Echo ordered. Follow up with cardiology if stress test needed.  plan for cath today
Na 134, stable. chronic. euvolemic.   Off NACL Tabs  monitor off Nacl, c/w 1000 cc Fluid restriction  monitor BMP  bp controlled
Patient seen and examined. Spent > 1 hour speaking to patient (using  phone and speaking to patients grandson in detail regarding pts condition and medication changes.   Pt states she has been walking, denies CP or MORENO.  Disopyramide has been discontinued.   Continue with metoprolol tartrate 50 mg po BID, hold parameters changed to HR <55 as d/w Dr. Shaver.  Patient to continue with medical therapy and will be referred for alcohol septal therapy at outside hospital. She is to follow up with her primary cardiologist Dr. Christianson for follow up. Informed pt and pts grandson if symptoms return to come to ED.  We will sign off case. Please call with questions.
resolved: per cards
continue monitoring. Troponin 18->18->17. CKMB index 2.2. Pt s/p cath on 12/11 with stent to mRCA. Currently pain free.   S/P cath   Echo noted, LVOT  HF eval appreciated  CTS surg   Cardiac  MRI   D/C planing and close FU after discharge with HF team  keep HR on low side
continue monitoring. Troponin 18->18->17. CKMB index 2.2. Pt s/p cath on 12/11 with stent to mRCA. Currently pain free.   S/P cath   Echo noted, LVOT  HF eval appreicated
continue monitoring. Troponin 18->18->17. CKMB index 2.2. Pt s/p cath on 12/11 with stent to mRCA. Currently pain free.   S/P cath   Echo noted, LVOT  HF eval appreciated  CTS surg   Cardiac  MRI   D/C planing and close FU after discharge with HF team  keep HR on low side
continue monitoring. Troponin 18->18->17. CKMB index 2.2. Pt s/p cath on 12/11 with stent to mRCA. Currently pain free.   S/P cath   Echo noted, LVOT  HF eval appreciated  CTS surg called  serial EKGs while on disopyramide, check QTc
continue monitoring. Troponin 18->18->17. CKMB index 2.2. Pt s/p cath on 12/11 with stent to mRCA. Currently pain free. Echo ordered. Follow up with cardiology if stress test needed.  plan for cath tomorrow
continue monitoring. Troponin 18->18->17. CKMB index 2.2. Pt s/p cath on 12/11 with stent to mRCA. Currently pain free.   S/P cath   Echo noted, LVOT  HF eval appreciated  CTS surg   Cardiac  MRI   D/C planing and close FU after discharge with HF team  keep HR on low side

## 2019-12-31 ENCOUNTER — TRANSCRIPTION ENCOUNTER (OUTPATIENT)
Age: 74
End: 2019-12-31

## 2019-12-31 VITALS
TEMPERATURE: 99 F | DIASTOLIC BLOOD PRESSURE: 69 MMHG | RESPIRATION RATE: 18 BRPM | HEART RATE: 58 BPM | OXYGEN SATURATION: 98 % | SYSTOLIC BLOOD PRESSURE: 111 MMHG

## 2019-12-31 LAB
ANION GAP SERPL CALC-SCNC: 13 MMO/L — SIGNIFICANT CHANGE UP (ref 7–14)
BUN SERPL-MCNC: 12 MG/DL — SIGNIFICANT CHANGE UP (ref 7–23)
CALCIUM SERPL-MCNC: 9.2 MG/DL — SIGNIFICANT CHANGE UP (ref 8.4–10.5)
CHLORIDE SERPL-SCNC: 102 MMOL/L — SIGNIFICANT CHANGE UP (ref 98–107)
CO2 SERPL-SCNC: 21 MMOL/L — LOW (ref 22–31)
CREAT SERPL-MCNC: 0.65 MG/DL — SIGNIFICANT CHANGE UP (ref 0.5–1.3)
GLUCOSE SERPL-MCNC: 104 MG/DL — HIGH (ref 70–99)
HCT VFR BLD CALC: 36.2 % — SIGNIFICANT CHANGE UP (ref 34.5–45)
HGB BLD-MCNC: 12 G/DL — SIGNIFICANT CHANGE UP (ref 11.5–15.5)
MAGNESIUM SERPL-MCNC: 2.3 MG/DL — SIGNIFICANT CHANGE UP (ref 1.6–2.6)
MCHC RBC-ENTMCNC: 32 PG — SIGNIFICANT CHANGE UP (ref 27–34)
MCHC RBC-ENTMCNC: 33.1 % — SIGNIFICANT CHANGE UP (ref 32–36)
MCV RBC AUTO: 96.5 FL — SIGNIFICANT CHANGE UP (ref 80–100)
NRBC # FLD: 0 K/UL — SIGNIFICANT CHANGE UP (ref 0–0)
PHOSPHATE SERPL-MCNC: 3.3 MG/DL — SIGNIFICANT CHANGE UP (ref 2.5–4.5)
PLATELET # BLD AUTO: 176 K/UL — SIGNIFICANT CHANGE UP (ref 150–400)
PMV BLD: 11.3 FL — SIGNIFICANT CHANGE UP (ref 7–13)
POTASSIUM SERPL-MCNC: 4 MMOL/L — SIGNIFICANT CHANGE UP (ref 3.5–5.3)
POTASSIUM SERPL-SCNC: 4 MMOL/L — SIGNIFICANT CHANGE UP (ref 3.5–5.3)
RBC # BLD: 3.75 M/UL — LOW (ref 3.8–5.2)
RBC # FLD: 13 % — SIGNIFICANT CHANGE UP (ref 10.3–14.5)
SODIUM SERPL-SCNC: 136 MMOL/L — SIGNIFICANT CHANGE UP (ref 135–145)
WBC # BLD: 7.81 K/UL — SIGNIFICANT CHANGE UP (ref 3.8–10.5)
WBC # FLD AUTO: 7.81 K/UL — SIGNIFICANT CHANGE UP (ref 3.8–10.5)

## 2019-12-31 RX ORDER — LANOLIN ALCOHOL/MO/W.PET/CERES
1 CREAM (GRAM) TOPICAL
Qty: 0 | Refills: 0 | DISCHARGE
Start: 2019-12-31

## 2019-12-31 RX ORDER — PREGABALIN 225 MG/1
1 CAPSULE ORAL
Qty: 30 | Refills: 0
Start: 2019-12-31 | End: 2020-01-29

## 2019-12-31 RX ORDER — PANTOPRAZOLE SODIUM 20 MG/1
1 TABLET, DELAYED RELEASE ORAL
Qty: 60 | Refills: 0
Start: 2019-12-31 | End: 2020-01-29

## 2019-12-31 RX ORDER — METOPROLOL TARTRATE 50 MG
1 TABLET ORAL
Qty: 90 | Refills: 0
Start: 2019-12-31 | End: 2020-01-29

## 2019-12-31 RX ADMIN — SODIUM CHLORIDE 3 MILLILITER(S): 9 INJECTION INTRAMUSCULAR; INTRAVENOUS; SUBCUTANEOUS at 05:50

## 2019-12-31 RX ADMIN — Medication 81 MILLIGRAM(S): at 12:54

## 2019-12-31 RX ADMIN — Medication 1 MILLIGRAM(S): at 12:54

## 2019-12-31 RX ADMIN — CLOPIDOGREL BISULFATE 75 MILLIGRAM(S): 75 TABLET, FILM COATED ORAL at 12:54

## 2019-12-31 RX ADMIN — Medication 25 MILLIGRAM(S): at 12:54

## 2019-12-31 RX ADMIN — Medication 100 MILLIGRAM(S): at 12:54

## 2019-12-31 RX ADMIN — SODIUM CHLORIDE 3 MILLILITER(S): 9 INJECTION INTRAMUSCULAR; INTRAVENOUS; SUBCUTANEOUS at 12:55

## 2019-12-31 RX ADMIN — PANTOPRAZOLE SODIUM 40 MILLIGRAM(S): 20 TABLET, DELAYED RELEASE ORAL at 05:52

## 2019-12-31 RX ADMIN — Medication 25 MILLIGRAM(S): at 05:52

## 2019-12-31 RX ADMIN — GABAPENTIN 100 MILLIGRAM(S): 400 CAPSULE ORAL at 12:54

## 2019-12-31 NOTE — DISCHARGE NOTE NURSING/CASE MANAGEMENT/SOCIAL WORK - PATIENT PORTAL LINK FT
You can access the FollowMyHealth Patient Portal offered by [facility] by registering at the following website: [email or web address]. By joining [facility]’s FollowMyHealth portal, you will also be able to view your health information using other applications (apps) compatible with our system.

## 2019-12-31 NOTE — PROGRESS NOTE ADULT - ASSESSMENT
73 y/o [place] female with PMHX of HTN, HLD, spinal stenosis s/p sx, CAD s/p stent to prox RCA on 12/11 comes in with complaints of chest pressure. She states it was the same pain she felt when she presented initially to [facility] on 12/11, but the pain did not resolve even s/p stent. Pain is worse when she eats, and also when she walks up a flight of stairs. Also admits to palpitations and feeling of anxiety. Denies SOB, syncope. 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 Also her brother diet at age 65, was told he had a heart murmur. Non smoker, no ETOH or drug abuse. HF consulted on 12/24 for TTE shows LVEF 65-70%, mod MR, severe dynamic LVOT.
75 y/o [place] female with PMHX of HTN, HLD, spinal stenosis s/p sx, CAD s/p stent to prox RCA on 12/11 comes in with complaints of chest pressure. She states it was the same pain she felt when she presented initially to NS on 12/11, but the pain did not resolve even s/p stent. Pain is worse when she eats, and also when she walks up a flight of stairs. Also admits to palpitations and feeling of anxiety. Denies SOB, syncope. 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 Also her brother diet at age 65, was told he had a heart murmur. Non smoker, no ETOH or drug abuse. HF consulted on 12/24 for TTE shows LVEF 65-70%, mod MR, severe dynamic LVOT.
My overall assessment is that this a 75 YO F with a history of CAD s/p SALAZAR to RCA 12/11/19 and HTN who was admitted with chest pain, describing NYHA III symptoms of CP/dyspnea on exertion. On exam she had a marked systolic murmur worsened by provacation and TTE revealed asymmetric basal septal hypertrophy (1.4 cm) with KOMAL and moderate MR along with a peak LVOT gradient of 231 mmHg at rest (classic late peaking of LVOT obstruction). MRI confirmed a max wall thickness of 1.4 cm and no LGE.    At this time it is not clear if she has true HCM or a phenocopy related to age, HTN, and basal septal hypertrophy with a hyperdynamic LV. Regardless, would treat in a similar fashion by reducing LVOT obstruction as much as possible.     She is not a candidate for disopyramide due to a prolonged QTc. With metoprolol tartrate 50 mg BID her HR is mostly 50-60 and she does have improved symptoms and murmur. Would follow her on this regimen though given severity of her LVOT obstruction it is likely she will need more than medical therapy eventually.    Septal myectomy is not ideal given septal thickness of only 1.4 cm and her age. Alcohol septal ablation would be preferable in my opinion should septal reduction therapy be required. Other options should she not be a candidate for septal reduction would be implanting a DC-PPM for intentional RV pacing to reduce gradient and allow for increasing negative inotropic agents or MitraClip to reduce KOMAL though the latter is quite investigational.     The plan for today is as follows:  - continue metoprolol tartrate 50 mg BID  - discussed with Dr. Christianson referring to a high volume center in alcohol septal ablations in case she fails medical therapy, other potential options for managing LVOT gradient as above  - discussed the importance of remaining hydrated to patient and avoiding sudden burst activities     Stable from HF perspective for discharge today. I would be happy to follow her in clinic if needed.     Please feel free to call me with any questions: [phone] .
conservative gi managemtpreeti issa current rx
ppi therapy, conservaitv egi barbara
73 y/o F with PMH of HTN, CAD (s/p cath with 1 stent to mRCA 1 week ago at [facility]), Left ventricular outflow obstruction, spinal stenosis presents to the ED complaining of chest pain. Case discussed with Dr. Arroyo.
75 y/o F with PMH of HTN, CAD (s/p cath with 1 stent to mRCA 9 days ago at [facility]), Left ventricular outflow obstruction, spinal stenosis presents to the ED complaining of chest pain found to have hyponatremia
? nccp, cardiac evaluation in progress, will start protonix bid.  refulux and esopahgeal spasm in ddx if negative eval  Will follow with you
75 y/o F with PMH of HTN, CAD (s/p cath with 1 stent to mRCA 1 week ago at [facility]), Left ventricular outflow obstruction, spinal stenosis presents to the ED complaining of chest pain. Case discussed with Dr. Arroyo.
TTE CONCLUSIONS:  1. Normal mitral valve. Systolic anterior motion of the  mitral valve. Moderate mitral regurgitation.  2. Mild concentric left ventricular hypertrophy. There is a  prominent basal septum (1.4 cm). The mid septum (1.0 cm).  3. Hyperdynamic left ventricle. There is evidence for  dynamic left ventricular outflow obstruction. Peak left  ventricular outflow tract gradient equals 231 mm Hg, mean  gradient is equal to 89 mm Hg, LVOT velocity time integral  equals 166 cm, consistent with severe LVOT obstruction.  4. Normal right ventricular size and function.  5. Estimated right ventricular systolic pressure equals 46  mm Hg, assuming right atrial pressure equals 10 mm Hg,  consistent with mild pulmonary hypertension.  *** Compared with echocardiogram of 4/15/2019, severe  dynamic LVOT obstruction is now seen. Mild pulmonary HTN is  now seen.    Assessment /Plan:   MORENO/SOB - cath with patent stents     TTE with Peak LV outflow tract gradient equals 231 mm Hg, mean gradient is equal to 89 mm Hg, this may very well explain her symptoms of MORENO  start disopyramide 200mg q12  QTc 472 m sec , daily EKG to r/o QTc prolonging
1. Hyponatremia due to hyposmolar hyponatremia.  -Na is stable and off Nacl tabs.   -continue Fluid restriction 800ml to 1L/day.   -Check Seurm Na daily. Monitor I/O's daily. Avoid overcorrection of NA (8-10meq/day)  2. HTN: -bp is acceptable.  -titrate bp meds to keep sbp >110 and < 130  3. sob/chest pain: s/p cardiac cath with patent stents; plan as per cardiology and heart failure team.    will sign off the case and re-consult as needed.
continieu ppi therapy.  appreciate all services
73 y/o F with PMH of HTN, CAD (s/p cath with 1 stent to mRCA 9 days ago at [facility]), Left ventricular outflow obstruction, spinal stenosis presents to the ED complaining of chest pain found to have hyponatremia    labs, chart reviewed
75 y/o F with PMH of HTN, CAD (s/p cath with 1 stent to mRCA 1 week ago at [facility]), Left ventricular outflow obstruction, spinal stenosis presents to the ED complaining of chest pain. Case discussed with Dr. Arroyo.
75 y/o F with PMH of HTN, CAD (s/p cath with 1 stent to mRCA 9 days ago at [facility]), Left ventricular outflow obstruction, spinal stenosis presents to the ED complaining of chest pain found to have hyponatremia
73 y/o F with PMH of HTN, CAD (s/p cath with 1 stent to mRCA 1 week ago at [facility]), Left ventricular outflow obstruction, spinal stenosis presents to the ED complaining of chest pain. Case discussed with Dr. Arroyo.
75 y/o F with PMH of HTN, CAD (s/p cath with 1 stent to mRCA 1 week ago at [facility]), Left ventricular outflow obstruction, spinal stenosis presents to the ED complaining of chest pain.
75 y/o F with PMH of HTN, CAD (s/p cath with 1 stent to mRCA 1 week ago at [facility]), Left ventricular outflow obstruction, spinal stenosis presents to the ED complaining of chest pain. Case discussed with Dr. Arroyo.
73 y/o F with PMH of HTN, CAD (s/p cath with 1 stent to mRCA 1 week ago at [facility]), Left ventricular outflow obstruction, spinal stenosis presents to the ED complaining of chest pain. Case discussed with Dr. Arroyo.
75 y/o F with PMH of HTN, CAD (s/p cath with 1 stent to mRCA 1 week ago at [facility]), Left ventricular outflow obstruction, spinal stenosis presents to the ED complaining of chest pain. Case discussed with Dr. Arroyo.
73 y/o F with PMH of HTN, CAD (s/p cath with 1 stent to mRCA 1 week ago at [facility]), Left ventricular outflow obstruction, spinal stenosis presents to the ED complaining of chest pain. Case discussed with Dr. Arroyo.
75 y/o F with PMH of HTN, CAD (s/p cath with 1 stent to mRCA 1 week ago at [facility]), Left ventricular outflow obstruction, spinal stenosis presents to the ED complaining of chest pain. Case discussed with Dr. Arroyo.
75 y/o F with PMH of HTN, CAD (s/p cath with 1 stent to mRCA 1 week ago at [facility]), Left ventricular outflow obstruction, spinal stenosis presents to the ED complaining of chest pain. Case discussed with Dr. Arroyo.
73 y/o F with PMH of HTN, CAD (s/p cath with 1 stent to mRCA 1 week ago at [facility]), Left ventricular outflow obstruction, spinal stenosis presents to the ED complaining of chest pain. Case discussed with Dr. Arroyo.
73 y/o F with PMH of HTN, CAD (s/p cath with 1 stent to mRCA 1 week ago at [facility]), Left ventricular outflow obstruction, spinal stenosis presents to the ED complaining of chest pain. Case discussed with Dr. Arroyo.

## 2019-12-31 NOTE — PROGRESS NOTE ADULT - PROBLEM SELECTOR PROBLEM 1
Chest pain
Hyponatremia
Left ventricular outflow tract obstruction
Hyponatremia
Chest pain
Chest pain
Left ventricular outflow tract obstruction
Chest pain
Hyponatremia
Chest pain

## 2019-12-31 NOTE — DISCHARGE NOTE PROVIDER - NSDCMRMEDTOKEN_GEN_ALL_CORE_FT
aspirin 81 mg oral delayed release tablet: 1 tab(s) orally once a day  clopidogrel 75 mg oral tablet: 1 tab(s) orally once a day MDD:1  cyanocobalamin 100 mcg oral tablet: 1 tab(s) orally once a day   folic acid 1 mg oral tablet: 1 tab(s) orally once a day  gabapentin 100 mg oral tablet: orally once a day  Lipitor 40 mg oral tablet: 1 tab(s) orally once a day (at bedtime)  melatonin 3 mg oral tablet: 1 tab(s) orally once a day (at bedtime)  metoprolol tartrate 25 mg oral tablet: 1 tab(s) orally 3 times a day  pantoprazole 40 mg oral delayed release tablet: 1 tab(s) orally 2 times a day   Vitamin B6 100 mg oral tablet: 1 tab(s) orally once a day

## 2019-12-31 NOTE — CHART NOTE - NSCHARTNOTEFT_GEN_A_CORE
RDN attempted to follow-up, but Pt off the unit @ time of visit.   Will reattempt to visit Pt at a later time; RDN remains available.

## 2019-12-31 NOTE — DISCHARGE NOTE PROVIDER - CARE PROVIDER_API CALL
Harry Christianson ()  Cardiovascular Disease; Internal Medicine; Nuclear Cardiology  [address], [address]  [place] [address]  Phone: [phone]  Fax: [phone]  Follow Up Time: 1 week    Juan R Wray)  Cardiovascular Disease; Internal Medicine  [address], [address]  [place] [id number]  Phone: [phone]  Fax: [phone]  Follow Up Time: 1 week

## 2019-12-31 NOTE — PROGRESS NOTE ADULT - SUBJECTIVE AND OBJECTIVE BOX
Follow-up Pulm Progress Note    No new respiratory events overnight.  Denies SOB/CP.     Medications:  MEDICATIONS  (STANDING):  aspirin enteric coated 81 milliGRAM(s) Oral daily  atorvastatin 40 milliGRAM(s) Oral at bedtime  clopidogrel Tablet 75 milliGRAM(s) Oral daily  Cyanocobalamin 100 mcg Tablet 100 MICROGram(s) 1 Tablet(s) Oral daily  folic acid 1 milliGRAM(s) Oral daily  gabapentin 100 milliGRAM(s) Oral daily  melatonin 3 milliGRAM(s) Oral at bedtime  metoprolol tartrate 25 milliGRAM(s) Oral three times a day  pantoprazole    Tablet 40 milliGRAM(s) Oral before breakfast  pyridoxine 100 milliGRAM(s) Oral daily  sodium chloride 0.65% Nasal 1 Spray(s) Both Nostrils four times a day  sodium chloride 0.9% lock flush 3 milliLiter(s) IV Push every 8 hours    MEDICATIONS  (PRN):  guaiFENesin   Syrup  (Sugar-Free) 100 milliGRAM(s) Oral every 6 hours PRN Cough    Vital Signs Last 24 Hrs  T(C): 37.2 (31 Dec 2019 13:05), Max: 37.2 (31 Dec 2019 13:05)  T(F): 98.9 (31 Dec 2019 13:05), Max: 98.9 (31 Dec 2019 13:05)  HR: 58 (31 Dec 2019 13:05) (42 - 85)  BP: 111/69 (31 Dec 2019 13:05) (111/69 - 150/58)  BP(mean): --  RR: 18 (31 Dec 2019 13:05) (18 - 18)  SpO2: 98% (31 Dec 2019 13:05) (95% - 98%)    LABS:                        12.0   7.81  )-----------( 176      ( 31 Dec 2019 05:30 )             36.2     12-31    136  |  102  |  12  ----------------------------<  104<H>  4.0   |  21<L>  |  0.65    Ca    9.2      31 Dec 2019 05:30  Phos  3.3     12-31  Mg     2.3     12-31    CAPILLARY BLOOD GLUCOSE    CULTURES: (if applicable)      Culture - Urine (collected 12-24-19 @ 06:22)  Source: URINE MIDSTREAM  Final Report (12-25-19 @ 14:08):    Culture grew 3 or more types of organisms which indicate    collection contamination; consider recollection only if    clinically indicated.    Physical Examination:  PULM: Decreased BS at bases  CVS: S1, S2 heard, III/VI R[place]    RADIOLOGY REVIEWED  CXR:     CT chest:    TTE:

## 2019-12-31 NOTE — DISCHARGE NOTE PROVIDER - PROVIDER TOKENS
PROVIDER:[TOKEN:[[id number]],FOLLOWUP:[1 week]],PROVIDER:[[id number]],FOLLOWUP:[1 week]]

## 2019-12-31 NOTE — PROGRESS NOTE ADULT - ATTENDING COMMENTS
Advanced care planning was discussed with patient and family.  Advanced care planning forms were reviewed and discussed.
Advanced care planning was discussed with patient and family.  Advanced care planning forms were reviewed and discussed.  Differential diagnosis and plan of care discussed with patient after the evaluation.   Pain assessed and judicious use of narcotics when appropriate was discussed.  Importance of Fall prevention discussed.  Counseling on Smoking and Alcohol cessation was offered when appropriate.  Counseling on Diet, exercise, and medication compliance was done.
Advanced care planning was discussed with patient and family.  Advanced care planning forms were reviewed and discussed.  Differential diagnosis and plan of care discussed with patient after the evaluation.   Pain assessed and judicious use of narcotics when appropriate was discussed.  Importance of Fall prevention discussed.  Counseling on Smoking and Alcohol cessation was offered when appropriate.  Counseling on Diet, exercise, and medication compliance was done.
Hamlet Franco MD   [facility]  Pager: [phone]  Cell: [phone]
SOB related to HOCM  no further pulmonary workup at present
Advanced care planning was discussed with patient and family.  Advanced care planning forms were reviewed and discussed.  Differential diagnosis and plan of care discussed with patient after the evaluation.   Pain assessed and judicious use of narcotics when appropriate was discussed.  Importance of Fall prevention discussed.  Counseling on Smoking and Alcohol cessation was offered when appropriate.  Counseling on Diet, exercise, and medication compliance was done.
please call with any questions, Dr. Martin (cell: [phone])  Answering Service: [phone]    NEPHROLOGY MEDICAL CARE, [facility]  MD Kerri Schultz MD Alexander Bangiev, MD Ljubisa Micic, MD      [place] Office: (Alfonso Martin MD)  [address]  [place] [address]  Ph: [phone]  Fax: [phone]  Call Ans Service ([phone]) to make Appointments.    [place] Office: (Morgan De La O MD)  [address] [place] [address]  Ph: [phone]  Fax: [phone]
Advanced care planning was discussed with patient and family.  Advanced care planning forms were reviewed and discussed.  Differential diagnosis and plan of care discussed with patient after the evaluation.   Pain assessed and judicious use of narcotics when appropriate was discussed.  Importance of Fall prevention discussed.  Counseling on Smoking and Alcohol cessation was offered when appropriate.  Counseling on Diet, exercise, and medication compliance was done.
My overall assessment is that this a 75 YO F with a history of CAD s/p SALAZAR to RCA 12/11/19 and HTN who was admitted with chest pain, describing NYHA III symptoms of CP/dyspnea on exertion. On exam she had a marked systolic murmur worsened by provacation and TTE revealed asymmetric basal septal hypertrophy (1.4 cm) with KOMAL and moderate MR along with a peak LVOT gradient of 231 mmHg at rest (classic late peaking of LVOT obstruction). MRI confirmed a max wall thickness of 1.4 cm and no LGE.    At this time it is not clear if she has true HCM or a phenocopy related to age, HTN, and basal septal hypertrophy with a hyperdynamic LV. Regardless, would treat in a similar fashion by reducing LVOT obstruction as much as possible.     She is not a candidate for disopyramide due to a prolonged QTc. With metoprolol tartrate 50 mg BID her HR is mostly 50-60 and she does have improved symptoms and murmur. Would follow her on this regimen though given severity of her LVOT obstruction it is likely she will need more than medical therapy eventually.    Septal myectomy is not ideal given septal thickness of only 1.4 cm and her age. Alcohol septal ablation would be preferable in my opinion should septal reduction therapy be required. Other options should she not be a candidate for septal reduction would be implanting a DC-PPM for intentional RV pacing to reduce gradient and allow for increasing negative inotropic agents or MitraClip to reduce KOMAL though the latter is quite investigational.     The plan for today is as follows:  - continue metoprolol tartrate 50 mg BID  - discussed with Dr. Christianson referring to a high volume center in alcohol septal ablations in case she fails medical therapy, other potential options for managing LVOT gradient as above  - discussed the importance of remaining hydrated to patient and avoiding sudden burst activities     Stable from HF perspective for discharge today. I would be happy to follow her in clinic if needed.     Please feel free to call me with any questions: [phone]
SOB related to HOCM  no further pulmonary workup at present
pl call for clotilde dai's  [facility]  Cell - [phone]  Office [phone]  Ans Serv [phone]
Advanced care planning was discussed with patient and family.  Advanced care planning forms were reviewed and discussed.
Advanced care planning was discussed with patient and family.  Advanced care planning forms were reviewed and discussed.
Advanced care planning was discussed with patient and family.  Advanced care planning forms were reviewed and discussed.  Differential diagnosis and plan of care discussed with patient after the evaluation.   Pain assessed and judicious use of narcotics when appropriate was discussed.  Importance of Fall prevention discussed.  Counseling on Smoking and Alcohol cessation was offered when appropriate.  Counseling on Diet, exercise, and medication compliance was done.
Advanced care planning was discussed with patient and family.  Advanced care planning forms were reviewed and discussed.
Advanced care planning was discussed with patient and family.  Advanced care planning forms were reviewed and discussed.

## 2019-12-31 NOTE — DISCHARGE NOTE PROVIDER - HOSPITAL COURSE
73 y/o F with PMH of HTN, CAD (s/p cath with 1 stent to mRCA 1 week ago at [facility]), Left ventricular outflow obstruction, spinal stenosis presents to the ED complaining of chest pain.     Problem: Chest pain.  Plan: continue monitoring. Troponin 18->18->17. CKMB index 2.2. Pt s/p cath on 12/11 with stent to mRCA. Currently pain free.     S/P cath     Echo noted, LVOT    HF eval appreciated Left ventricular outflow tract obstruction. Recommendation: TTE reviewed w/ Dr. Dickinson and  phone used to communicate with patient in detail.     Severe LVOT obstruction.     Pt admits to chest pressure when walking up a flight of stairs. Also admits to palpitations.     Will d/c Toprol and add metoprolol tartrate 25 mg po BID.     Please get cardiac MRI to better evaluate LVOT.    CTS surg - not a sugical candidate    Cardiac  MRI - Focal thickening of the basal interventricular septum measuring about 1.4 cm with adjacent turbulent high velocity blood flow within the left ventricular outflow tract. Suggestion of systolic anterior motion of the mitral valve leaflet. The constellation of findings are suggestive of hypertrophic obstructive cardiomyopathy.        D/C planing and close FU after discharge with HF team    keep HR on low side. Left ventricular outflow tract obstruction.  Plan: Continue to monitor.     Patient needs to follow up with Dr Kamala Hardin [facility] for cryoablation.         Patient is hemodynamically stable and without complaints and patient is ready for discharge.  Case discussed and medications reviewed with patient and PMD.  Agreed with above.  Medications needed sent to pharmacy.

## 2019-12-31 NOTE — PROGRESS NOTE ADULT - REASON FOR ADMISSION
Chest pain

## 2019-12-31 NOTE — PROGRESS NOTE ADULT - PROVIDER SPECIALTY LIST ADULT
Gastroenterology
Nephrology
Cardiology
Gastroenterology
Heart Failure
Nephrology
Nephrology
Heart Failure
Nephrology
Heart Failure
Internal Medicine
Pulmonology
Internal Medicine
Pulmonology
Pulmonology
Internal Medicine

## 2019-12-31 NOTE — DISCHARGE NOTE PROVIDER - NSDCCPCAREPLAN_GEN_ALL_CORE_FT
PRINCIPAL DISCHARGE DIAGNOSIS  Diagnosis: Left ventricular outflow obstruction  Assessment and Plan of Treatment: follow up with Wilfred Saravia @ [facility] for Alcohol Ablation [phone]      SECONDARY DISCHARGE DIAGNOSES  Diagnosis: Chest pain  Assessment and Plan of Treatment: s/p Cath 12/11 with stent to mRCA; s/p Cath 12/23- patent stent

## 2020-03-16 ENCOUNTER — APPOINTMENT (OUTPATIENT)
Dept: OPHTHALMOLOGY | Facility: CLINIC | Age: 75
End: 2020-03-16

## 2020-04-03 ENCOUNTER — APPOINTMENT (OUTPATIENT)
Dept: OPHTHALMOLOGY | Facility: CLINIC | Age: 75
End: 2020-04-03

## 2021-01-18 NOTE — ED ADULT TRIAGE NOTE - NS ED NURSE DIRECT TO ROOM YN
Spoke with pt's  Vemeghana Estevez to discuss therapies recommendation for rehab  Andrato Prashanthkers states they are agreeable but at this time only requesting a referral to [facility] referral sent  No

## 2021-02-12 ENCOUNTER — APPOINTMENT (OUTPATIENT)
Dept: OPHTHALMOLOGY | Facility: CLINIC | Age: 76
End: 2021-02-12

## 2021-03-08 ENCOUNTER — APPOINTMENT (OUTPATIENT)
Dept: OPHTHALMOLOGY | Facility: CLINIC | Age: 76
End: 2021-03-08

## 2021-08-24 NOTE — ED ADULT TRIAGE NOTE - TEMPERATURE IN CELSIUS (DEGREES C)
36.7
episode of abd pain, nauesa, sob tonight, self resolved.  possibly GI spasm, gastritis/gerd.  well appearing now. normal, benign exam.  pmd fu

## 2021-09-30 ENCOUNTER — APPOINTMENT (OUTPATIENT)
Dept: OPHTHALMOLOGY | Facility: CLINIC | Age: 76
End: 2021-09-30

## 2021-10-06 PROBLEM — I10 ESSENTIAL HYPERTENSION: Status: ACTIVE | Noted: 2019-04-18

## 2021-10-11 ENCOUNTER — APPOINTMENT (OUTPATIENT)
Dept: OPHTHALMOLOGY | Facility: CLINIC | Age: 76
End: 2021-10-11

## 2022-01-07 NOTE — PATIENT PROFILE ADULT - HAVE YOU EXPERIENCED VIOLENCE OR A TRAUMATIC EVENT?
31M pw fever to 102F onset yesterday a/w h/a, lower back pain and disorientation / mental fog. Denies CP, SOB, cough, abd pain, N/V/D, UTI sx, LE pain / swelling. Denies recent travel, + co-workers who tested + for COVID. Pt takign Nyquil w/o relief. + Febrile on ED arrival.     No PMH, no PSH, no meds, NDKA. + vaccinated against COVID x 2 doses, not yet boosted. no

## 2022-01-27 ENCOUNTER — APPOINTMENT (OUTPATIENT)
Dept: OPHTHALMOLOGY | Facility: CLINIC | Age: 77
End: 2022-01-27

## 2022-09-30 ENCOUNTER — APPOINTMENT (OUTPATIENT)
Dept: OPHTHALMOLOGY | Facility: CLINIC | Age: 77
End: 2022-09-30

## 2022-11-02 PROCEDURE — 76514 ECHO EXAM OF EYE THICKNESS: CPT

## 2022-11-03 ENCOUNTER — APPOINTMENT (OUTPATIENT)
Dept: OPHTHALMOLOGY | Facility: CLINIC | Age: 77
End: 2022-11-03

## 2022-11-03 ENCOUNTER — NON-APPOINTMENT (OUTPATIENT)
Age: 77
End: 2022-11-03

## 2022-11-03 PROCEDURE — 92004 COMPRE OPH EXAM NEW PT 1/>: CPT

## 2022-11-03 PROCEDURE — 92133 CPTRZD OPH DX IMG PST SGM ON: CPT

## 2022-12-13 NOTE — CONSULT NOTE ADULT - PROBLEM SELECTOR RECOMMENDATION 9
TTE reviewed w/ Dr. Dickinson. Severe LVOT obstruction.   Pt admits to chest pressure when walking up a flight of stairs. Also admits to palpitations.   Will d/c Toprol and add metoprolol tartrate 50 mg po BID.   Please get cardiac MRI to better evaluate LVOT.  CTS consult from Dr. Bell requested.   We will continue to follow. TTE reviewed w/ Dr. Dickinson and  phone used to communicate with patient in detail.   Severe LVOT obstruction.   Pt admits to chest pressure when walking up a flight of stairs. Also admits to palpitations.   Will d/c Toprol and add metoprolol tartrate 50 mg po BID.   Please get cardiac MRI to better evaluate LVOT.  CTS consult from Dr. Bell requested.   The above d/w pt's grandson.  We will continue to follow. ST elevation myocardial infarction (STEMI)

## 2022-12-27 ENCOUNTER — APPOINTMENT (OUTPATIENT)
Dept: OPHTHALMOLOGY | Facility: CLINIC | Age: 77
End: 2022-12-27

## 2023-01-20 ENCOUNTER — APPOINTMENT (OUTPATIENT)
Dept: OPHTHALMOLOGY | Facility: CLINIC | Age: 78
End: 2023-01-20

## 2023-07-05 NOTE — H&P ADULT - PROBLEM SELECTOR PROBLEM 1
----- Message from Enoc Mistry MD sent at 7/3/2023  6:10 PM CDT -----  Nml ct scan of chest   Pul nodule seen on previous scan that was seen last time, is not visualized this time.  Reassuring .   Pneumonia of both lungs due to infectious organism, unspecified part of lung

## 2023-08-31 NOTE — DIETITIAN INITIAL EVALUATION ADULT. - COPY OF WEIGHT IN KG
Chief Complaint   Patient presents with    Sinus Problem     X 1.5 weeks        HISTORY OF PRESENT ILLNESS: Patient is a pleasant 48 y.o. male who presents to urgent care today ongoing sinus pain and pressure for the last week and a half with green to brown sputum production that is especially worse in the morning.  Patient has been taking OTC medication with little to no relief.  He denies any cough, no major shortness of breath, no noted temp fevers.    There are no problems to display for this patient.      Allergies:Patient has no known allergies.    Current Outpatient Medications Ordered in Epic   Medication Sig Dispense Refill    amoxicillin-clavulanate (AUGMENTIN) 875-125 MG Tab Take 1 Tablet by mouth 2 times a day for 5 days. 10 Tablet 0    fluticasone (FLONASE) 50 MCG/ACT nasal spray Administer 2 Sprays into affected nostril(S) at bedtime. 16 g 1     No current [facility]-ordered facility-administered medications on file.       History reviewed. No pertinent past medical history.    Social History     Tobacco Use    Smoking status: Never    Smokeless tobacco: Never   Substance Use Topics    Alcohol use: Yes    Drug use: Never       No family status information on file.   History reviewed. No pertinent family history.    ROS:  Review of Systems   Constitutional: Negative for fever, chills, weight loss, malaise, and fatigue.   HENT: Negative for ear pain, nosebleeds, positive congestion, negative sore throat and neck pain.    Eyes: Negative for vision changes.   Neuro: Negative for headache, sensory changes, weakness, seizure, LOC.   Cardiovascular: Negative for chest pain, palpitations, orthopnea and leg swelling.   Respiratory: Negative for cough, sputum production, shortness of breath and wheezing.   Gastrointestinal: Negative for abdominal pain, nausea, vomiting or diarrhea.   Genitourinary: Negative for dysuria, urgency and frequency.  Musculoskeletal: Negative for falls, neck pain, back pain, joint pain,  "myalgias.   Skin: Negative for rash, diaphoresis.     Exam:  /84   Pulse 81   Temp 36.7 °C (98.1 °F)   Resp 18   Ht 1.88 m (6' 2\")   Wt 99.8 kg (220 lb)   SpO2 97%   General: well-nourished, well-developed male in NAD  Head: normocephalic, atraumatic  Eyes: PERRLA, no conjunctival injection, acuity grossly intact, lids normal.  Ears: normal shape and symmetry, no tenderness, no discharge. External canals are without any significant edema or erythema. Tympanic membranes are without any inflammation, no effusion. Gross auditory acuity is intact.  Nose: symmetrical without tenderness, positive nasal discharge.  Frontal sinus pain and pressure with light palpation.  Mouth/Throat: reasonable hygiene, no erythema, exudates or tonsillar enlargement.  Neck: no masses, range of motion within normal limits, no tracheal deviation. No obvious thyroid enlargement.   Lymph: no cervical adenopathy. No supraclavicular adenopathy.   Neuro: alert and oriented. Cranial nerves 1-12 grossly intact. No sensory deficit.   Cardiovascular: regular rate and rhythm. No edema.  Pulmonary: no distress. Chest is symmetrical with respiration, no wheezes, crackles, or rhonchi.   Abdomen: soft, non-tender, no guarding, no hepatosplenomegaly.  Musculoskeletal: no clubbing, appropriate muscle tone, gait is stable.  Skin: warm, dry, intact, no clubbing, no cyanosis, no rashes.   Psych: appropriate mood, affect, judgement.         Assessment/Plan:  1. Acute non-recurrent frontal sinusitis  amoxicillin-clavulanate (AUGMENTIN) 875-125 MG Tab    fluticasone (FLONASE) 50 MCG/ACT nasal spray      Patient is a pleasant 48 y.o. male who presents to urgent care today ongoing sinus pain and pressure for the last week and a half with green to brown sputum production that is especially worse in the morning.  Patient has been taking OTC medication with little to no relief.  He denies any cough, no major shortness of breath, no noted temp fevers.  On " physical exam patient has noted nasal congestion.  Complains of pain with light palpation to the frontal sinus area.  Likely sinusitis, patient placed on Augmentin along with Flonase.  Encouraged use of Claritin or Zyrtec as well. Advised patient to drink plenty of fluids, take Motrin and Tylenol as needed, vitamin C, D and zinc.  Patient is aware of the plan of care and agreeable at this time, encouraged them to follow-up if they continue to get worse or do not improve. Total time spent with the patient 14 minutes to include, review of chart, charting, assessment, procedure.       Supportive care, differential diagnoses, and indications for immediate follow-up discussed with patient.   Pathogenesis of diagnosis discussed including typical length and natural progression.   Instructed to return to clinic or nearest emergency department for any change in condition, further concerns, or worsening of symptoms.  Patient states understanding of the plan of care and discharge instructions.  Instructed to make an appointment, for follow up, with primary care provider.      Please note that this dictation was created using voice recognition software. I have made every reasonable attempt to correct obvious errors, but I expect that there are errors of grammar and possibly content that I did not discover before finalizing the note.      Susan NGO    74.8

## 2024-01-18 NOTE — ED ADULT TRIAGE NOTE - AS O2 DELIVERY
From: Yadira Jones  To: Ana M Doe  Sent: 1/18/2024 10:39 AM CST  Subject: Primary care referral/post partum support    Hi Dr Doe,     I’m wondering if you have a referral for someone in the [facility] area that I can use as a primary physician. I’m 5 months port partum and I’m having very low energy, I’m wondering if I should have blood work done to check iron levels? Or is this something that can be done through your office? I’m not always sure what falls under the category of OB or if I should find a primary since I should probably have one anyway. I’m also experiencing some anxiety/depression so I think that should probably be addressed along with the low energy.     Thanks so much    Yadira Jones  
room air

## 2024-03-22 RX ORDER — FOLIC ACID 0.8 MG
1 TABLET ORAL
Qty: 0 | Refills: 0 | DISCHARGE

## 2024-03-22 RX ORDER — METOPROLOL TARTRATE 50 MG
1 TABLET ORAL
Qty: 0 | Refills: 0 | DISCHARGE

## 2024-03-22 RX ORDER — PYRIDOXINE HCL (VITAMIN B6) 100 MG
1 TABLET ORAL
Qty: 0 | Refills: 0 | DISCHARGE

## 2024-03-22 RX ORDER — LOSARTAN/HYDROCHLOROTHIAZIDE 100MG-25MG
1 TABLET ORAL
Qty: 0 | Refills: 0 | DISCHARGE

## 2024-03-22 RX ORDER — PREGABALIN 225 MG/1
1 CAPSULE ORAL
Qty: 0 | Refills: 0 | DISCHARGE

## 2024-03-22 RX ORDER — GABAPENTIN 400 MG/1
0 CAPSULE ORAL
Qty: 0 | Refills: 0 | DISCHARGE

## 2024-03-22 RX ORDER — MELOXICAM 15 MG/1
1 TABLET ORAL
Qty: 0 | Refills: 0 | DISCHARGE

## 2024-04-22 NOTE — PATIENT PROFILE ADULT - LIVING ENVIRONMENT
Answers submitted by the patient for this visit:  Patient Health Questionnaire (Submitted on 4/22/2024)  If you checked off any problems, how difficult have these problems made it for you to do your work, take care of things at home, or get along with other people?: Extremely difficult  PHQ9 TOTAL SCORE: 24  BO-7 (Submitted on 4/17/2024)  BO 7 TOTAL SCORE: 21          [facility]                                     Progress Note    Patient Name: Radha Beckwith  Date: 4/22/24         Service Type: Individual      Session Start Time: 3:30pm Session End Time: 4:30pm     Session Length: 60    Session #: 33    Attendees: Client    Service Modality:  Video Visit:      Provider verified identity through the following two step process.  Patient provided:  Patient is known previously to provider    Telemedicine Visit: The patient's condition can be safely assessed and treated via synchronous audio and visual telemedicine encounter.      Reason for Telemedicine Visit: Patient convenience (e.g. access to timely appointments / distance to available provider)    Originating Site (Patient Location): Patient's home    Distant Site (Provider Location): Provider Remote Setting- Home Office    Consent:  The patient/guardian has verbally consented to: the potential risks and benefits of telemedicine (video visit) versus in person care; bill my insurance or make self-payment for services provided; and responsibility for payment of non-covered services.     Patient would like the video invitation sent by:  My Chart    Mode of Communication:  Video Conference via [facility]    Distant Location (Provider):  On-site    As the provider I attest to compliance with applicable laws and regulations related to telemedicine.    DATA  Interactive Complexity: No  Crisis: No        Progress Since Last Session (Related to Symptoms / Goals / Homework):   Symptoms: No change .    Homework: Completed in session      Episode of Care  Goals: Minimal progress - ACTION (Actively working towards change); Intervened by reinforcing change plan / affirming steps taken     Current / Ongoing Stressors and Concerns:   The client stated she's been trying to do yard work.   Stated her boyfriend hasn't been drinking for about four days.   Had cervical spinal injections on Friday.        Treatment Objective(s) Addressed in This Session:   Increase interest, engagement, and pleasure in doing things  Feel less tired and more energy during the day        Intervention:   Talked about the current symptoms the client is experiencing and the frustrations she's feeling around them. Talked about how things are with her relationship lately as he has been sober for the past few days. Talked about if she is reinforcing the positive things she's seeing with him. The client stated her son continues to call her daily to talk.          Assessments completed prior to visit:  The following assessments were completed by patient for this visit:  PHQ9:       2/22/2024     2:26 PM 2/29/2024     1:46 PM 3/14/2024     2:53 PM 3/27/2024     8:50 PM 4/10/2024     1:59 PM 4/17/2024     1:20 PM 4/22/2024     3:31 PM   PHQ-9 SCORE   PHQ-9 Total Score MyChart 22 (Severe depression) 21 (Severe depression) 24 (Severe depression) 22 (Severe depression) 22 (Severe depression) 24 (Severe depression) 24 (Severe depression)   PHQ-9 Total Score 22 21 24    24 22 22 24 24     GAD7:       1/2/2024    10:50 AM 1/18/2024     2:56 PM 2/5/2024     6:27 PM 2/19/2024     3:39 PM 3/12/2024     8:32 PM 3/27/2024     8:51 PM 4/17/2024     1:23 PM   BO-7 SCORE   Total Score 20 (severe anxiety) 20 (severe anxiety) 21 (severe anxiety) 19 (severe anxiety) 20 (severe anxiety) 19 (severe anxiety) 21 (severe anxiety)   Total Score 20 20    20 21 19 20 19 21    21     PROMIS 10-Global Health (all questions and answers displayed):       11/26/2023    10:43 PM 2/27/2024     6:40 PM 3/12/2024     8:35 PM 3/27/2024      8:53 PM 4/2/2024     4:38 PM 4/10/2024     1:58 PM 4/17/2024     1:28 PM   PROMIS 10   In general, would you say your health is: Fair Fair Poor Poor Poor Poor Poor   In general, would you say your quality of life is: Poor Fair Poor Poor Poor Poor Poor   In general, how would you rate your physical health? Poor Poor Poor Poor Poor Poor Poor   In general, how would you rate your mental health, including your mood and your ability to think? Poor Fair Poor Poor Poor Poor Poor   In general, how would you rate your satisfaction with your social activities and relationships? Poor Poor Poor Poor Poor Poor Poor   In general, please rate how well you carry out your usual social activities and roles Fair Poor Poor Poor Poor Fair Poor   To what extent are you able to carry out your everyday physical activities such as walking, climbing stairs, carrying groceries, or moving a chair? A little A little A little A little A little A little A little   In the past 7 days, how often have you been bothered by emotional problems such as feeling anxious, depressed, or irritable? Always Always Always Always Always Always Always   In the past 7 days, how would you rate your fatigue on average? Severe Severe Severe Severe Severe Severe Very severe   In the past 7 days, how would you rate your pain on average, where 0 means no pain, and 10 means worst imaginable pain? 7 7 8 7 7 7 7   In general, would you say your health is: 2 2 1 1 1 1 1   In general, would you say your quality of life is: 1 2 1 1 1 1 1   In general, how would you rate your physical health? 1 1 1 1 1 1 1   In general, how would you rate your mental health, including your mood and your ability to think? 1 2 1 1 1 1 1   In general, how would you rate your satisfaction with your social activities and relationships? 1 1 1 1 1 1 1   In general, please rate how well you carry out your usual social activities and roles. (This includes activities at home, at work and in your  community, and responsibilities as a parent, child, spouse, employee, friend, etc.) 2 1 1 1 1 2 1   To what extent are you able to carry out your everyday physical activities such as walking, climbing stairs, carrying groceries, or moving a chair? 2 2 2 2 2 2 2   In the past 7 days, how often have you been bothered by emotional problems such as feeling anxious, depressed, or irritable? 5 5 5 5 5 5 5   In the past 7 days, how would you rate your fatigue on average? 4 4 4 4 4 4 5   In the past 7 days, how would you rate your pain on average, where 0 means no pain, and 10 means worst imaginable pain? 7 7 8 7 7 7 7   Global Mental Health Score 4 6 4 4 4 4    4 4    4   Global Physical Health Score 7 7 7 7 7 7    7 6    6   PROMIS TOTAL - SUBSCORES 11 13 11 11 11 11    11 10    10     [place] Suicide Severity Rating Scale (Lifetime/Recent)      10/4/2022    11:00 AM   [place] Suicide Severity Rating (Lifetime/Recent)   Q1 Wished to be Dead (Past Month) no   Q2 Suicidal Thoughts (Past Month) no   Q3 Suicidal Thought Method no   Q4 Suicidal Intent without Specific Plan no   Q5 Suicide Intent with Specific Plan yes   Q6 Suicide Behavior (Lifetime) yes   Within the Past 3 Months? no   Level of Risk per Screen high risk         ASSESSMENT: Current Emotional / Mental Status (status of significant symptoms):   Risk status (Self / Other harm or suicidal ideation)   Patient denies current fears or concerns for personal safety.   Patient denies current or recent suicidal ideation or behaviors.   Patient denies current or recent homicidal ideation or behaviors.   Patient denies current or recent self injurious behavior or ideation.   Patient denies other safety concerns.   Patient reports there has been no change in risk factors since their last session.     Patient reports there has been no change in protective factors since their last session.     Recommended that patient call 911 or go to the local ED should there be a change  in any of these risk factors.     Appearance:   Appropriate    Eye Contact:   Good    Psychomotor Behavior: Normal    Attitude:   Cooperative    Orientation:   All   Speech    Rate / Production: Normal/ Responsive Normal     Volume:  Normal    Mood:    Normal   Affect:    Appropriate    Thought Content:  Clear  Rumination    Thought Form:  Coherent  Tangential    Insight:    Good      Medication Review:   No changes to current psychiatric medication(s)     Medication Compliance:   Yes     Changes in Health Issues:   None reported     Chemical Use Review:   Substance Use: Chemical use reviewed, no active concerns identified      Tobacco Use: No change in amount of tobacco use since last session.  Patient declined discussion at this time    Diagnosis:  1. Moderate episode of recurrent major depressive disorder (H)    2. Generalized anxiety disorder          Collateral Reports Completed:   Not Applicable    PLAN: (Patient Tasks / Therapist Tasks / Other)  Continue to continue to work on emotion regulation skills.         Ricarda Bhatia, [facility]                                                         ______________________________________________________________________    Individual Treatment Plan    Patient's Name: Radha Beckwith  YOB: 1974    Date of Creation: 6/28/23  Date Treatment Plan Last Reviewed/Revised: 2/15/24    DSM5 Diagnoses: 296.32 (F33.1) Major Depressive Disorder, Recurrent Episode, Moderate _  Psychosocial / Contextual Factors: living with boyfriend, memory issues  PROMIS (reviewed every 90 days):     Referral / Collaboration:  Referral to another professional/service is not indicated at this time..    Anticipated number of session for this episode of care: 9-12 sessions  Anticipation frequency of session:  as needed  Anticipated Duration of each session: 38-52 minutes  Treatment plan will be reviewed in 90 days or when goals have been changed.       MeasurableTreatment Goal(s)  related to diagnosis / functional impairment(s)  Goal 1: Patient will increase communication skills with family members.    Objective #A (Patient Action)    Patient will learn & utilize at least 2 assertive communication skills weekly.  Status: Continued - Date(s): 2/15/24    Intervention(s)  Therapist will teach emotional regulation skills. distress tolerance skills, interpersonal effectiveness skills, emotion regluation skills, mindfulness skills, radical acceptance. Therapist will teach client how to ID body cues for anxiety, anxiety reduction techniques, how to ID triggers for depression and anxiety- decrease reactivity/eliminate, lifestyle changes to reduce depression and anxiety, communication skills, explore cognitive beliefs and help client to develop healthy cognitive patterns and beliefs.    Objective #B  Patient will use thought-stopping strategy daily to reduce intrusive thoughts.  Status: Continued - Date(s): 2/15/24    Intervention(s)  Therapist will teach emotional regulation skills. distress tolerance skills, interpersonal effectiveness skills, emotion regluation skills, mindfulness skills, radical acceptance. Therapist will teach client how to ID body cues for anxiety, anxiety reduction techniques, how to ID triggers for depression and anxiety- decrease reactivity/eliminate, lifestyle changes to reduce depression and anxiety, communication skills, explore cognitive beliefs and help client to develop healthy cognitive patterns and beliefs.      Goal 2: Patient will decrease depression symptoms.      Objective #A (Patient Action)    Status: Continued - Date(s): 2/15/24    Patient will Increase interest, engagement, and pleasure in doing things  Decrease frequency and intensity of feeling down, depressed, hopeless  Improve quantity and quality of night time sleep / decrease daytime naps  Feel less tired and more energy during the day   Improve diet, appetite, mindful eating, and / or meal  planning  Identify negative self-talk and behaviors: challenge core beliefs, myths, and actions  Improve concentration, focus, and mindfulness in daily activities   Feel less fidgety, restless or slow in daily activities / interpersonal interactions.    Intervention(s)  Therapist will teach emotional regulation skills. distress tolerance skills, interpersonal effectiveness skills, emotion regluation skills, mindfulness skills, radical acceptance. Therapist will teach client how to ID body cues for anxiety, anxiety reduction techniques, how to ID triggers for depression and anxiety- decrease reactivity/eliminate, lifestyle changes to reduce depression and anxiety, communication skills, explore cognitive beliefs and help client to develop healthy cognitive patterns and beliefs.    Objective #B  Patient will identify three distraction and diversion activities and use those activities to decrease level of anxiety  .    Status: Continued - Date(s):  2/15/24    Intervention(s)  Therapist will teach emotional regulation skills. distress tolerance skills, interpersonal effectiveness skills, emotion regluation skills, mindfulness skills, radical acceptance. Therapist will teach client how to ID body cues for anxiety, anxiety reduction techniques, how to ID triggers for depression and anxiety- decrease reactivity/eliminate, lifestyle changes to reduce depression and anxiety, communication skills, explore cognitive beliefs and help client to develop healthy cognitive patterns and beliefs.      Patient has reviewed and agreed to the above plan.      Ricarda Bhatia [facility]     no

## 2024-08-10 NOTE — PHYSICAL THERAPY INITIAL EVALUATION ADULT - GENERAL OBSERVATIONS, REHAB EVAL
Date of Service:  08/10/24      PREOPERATIVE DIAGNOSIS:   Obstructing right ureteral stone with UTI, sepsis  Nonobstructing left renal stone     POSTOPERATIVE DIAGNOSIS:   same     PROCEDURE:   Cystoscopy with bilateral retrograde pyelogram  Cystoscopy with bilateral 6x26 JJ ureteral stent placement     SURGEON:   Gabrielle Escobar MD      ASSISTANT:   None.      ANESTHESIA:   General     ESTIMATED BLOOD LOSS:   None.      COMPLICATIONS:   None.      SPECIMENS:   none     DRAIN(S):  Bilateral 6x26 JJ  ureteral stent placement.      INDICATION FOR PROCEDURE:   Patient presented to ED 8/9/24 afternoon with AMS.   Upon admission she was intubated and admitted to ICU for septic shock.  CT showed a ~2 cm right UPJ stone with an atrophic right kidney, air in collecting system, mild hydro.  UA suggestive of infection.  ICU attending contacted me 8/10/24 AM and he reported no other obvious source of infection other than the stone/UTI.  She's been placed on pressors and started on CVVH with Nephrology.  We agreed to emergently proceed to the OR for the aforementioned proceed to unobstruct the right side and also to place a stent on the left preemptively for planned future bilateral ureteroscopy.          DESCRIPTION OF PROCEDURE:   After informed consent was obtained from daughter over phone, patient was brought to the operating suite.  She was already intubated from the ICU.  Timeout was performed and subsequently the patient was prepped and draped in the usual sterile fashion and placed in modified dorsal lithotomy position (difficult to position her legs in stirrups due to lymphedema, so candycanes were used).  Initially, a 22-Arabic rigid cystoscope was used to atraumatically enter the urethra and the bladder.  Right UO identified and sensor wire passed up the ureter to the stone which was visible on fluoro but I could not advance the wire beyond the stone.  5F introduced over the wire, wire switched out for an angled glidewire  and I was able to navigate this beyond the stone.  5F then passed beyond the stone and I used this to perform a gentle retrograde pyelogram to outline the collecting system, mildly hydronephrotic.  Sensor wire passed through the 5F and this was offloaded.  A stent in the size of 6x26 was then passed and deployed in the right ureter with proximal curl seen adequately in the right renal pelvis and distal curl seen adequately under direct visualization within the bladder.  No purulence appreciated.      We then turned our attention to the left.  5F open ended catheter used to cannulate the left UO and to perform a gentle retrograde pyelogram to outline the collecting system.  Sensor wire passed through the 5F and this was offloaded.  Wire was passed up to the level of the left renal pelvis under fluoroscopic guidance.  A stent in the size of 6x26 was then passed and deployed in the left ureter with proximal curl seen adequately in the left renal pelvis and distal curl seen adequately under direct visualization within the bladder.      Temperature probe parker replaced.  Patient was transferred intubated back to ICU.         Patient received supine in bed this AM

## 2024-10-11 NOTE — ED ADULT NURSE NOTE - NS PRO PASSIVE SMOKE EXP
Health Maintenance       Diabetes Eye Exam (Yearly)  Never done    Diabetes Foot Exam (Yearly)  Never done    DTaP/Tdap/Td Vaccine (1 - Tdap)  Never done    Shingles Vaccine (1 of 2)  Never done    Osteoporosis Screening (Once)  Never done    Pneumococcal Vaccine 65+ (2 of 2 - PCV)  Overdue since 11/17/2016    Diabetes A1C (Every 6 Months)  Ordered on 10/11/2024    Traditional Medicare- Medicare Wellness Visit (Yearly)  Never done    COVID-19 Vaccine (4 - 2023-24 season)  Overdue since 9/1/2024    Influenza Vaccine (1)  Due since 9/1/2024    DM/CKD Microalbumin Ratio (Yearly)  Ordered on 10/11/2024           Following review of the above:  Patient is not proceeding with: Diabetes Eye Exam, Diabetes Foot Exam, Osteoporosis screening, COVID-19, Dtap/Tdap/Td, Influenza, Pneumococcal, and Shingles.    Note: Refer to final orders and clinician documentation.       Unknown

## 2025-07-02 NOTE — ED ADULT NURSE NOTE - DRUG PRE-SCREENING (DAST -1)
Reason For Visit  NICCI EDWARD is an established patient here today for a chief complaint of pt is here for a f/u appt and rx refill and a follow-up management of dm,htn.   :  services not used.   NICCI EDWARD accepted a chaperone. She is accompanied by her family member daughter.        Quality    Adult Wellness CI height documented, discussion of regular exercise, exercising regularly, printed information given for activities, discussion of nutritional quality of diet, patient education given about proper diet, not using alcohol, colonoscopy performed: 10/22/2014, no tobacco use, mammogram performed: 01/14/2011, does not have feelings of hopelessness (PHQ-2), no Anhedonia (PHQ-2), not taking medication for depression, monitoring patient, preventive medicine therapy for influenza, preventive medicine therapy for pneumococcal, pain scale level not reviewed, has not fallen within the last 12 months, able to walk, taking aspirin, discussion of risks and benefits of Aspirin and no medications withdrawn because of contraindication.   Diabetes CI height documented, recent medical exam by an Ophthalmologist: 01/14/2013, no tobacco use, does not have feelings of hopelessness (PHQ-2), no anhedonia (PHQ-2), not taking medication for depression, monitoring patient, preventative medicine test results documented/reviewed for Hemoglobin A1c (11/02/2017), ACE inhibitor therapy prescribed and angiotensin receptor blocker prescribed.      History of Present Illness  pt presents for management of htn and discuss lab results  pt seen to have worsening anemia on labs, states she does not take iron tabs because it make her nauseous  pt states she is out of metoprolol 50mg that she has been cutting in half  denies any shortness of breath, chest pain does have some fatigue    advised pt if she does not start taking iron tabs will need IV iron or blood transfusion. pt states she does not want either.      Review of  Systems    Const: Normal.   Eyes: Normal.   ENT: Normal.   CV: Normal.   Resp: Normal.   GI: no bright red blood per rectum, no bowel habit changes and Normal.       Allergies  Codeine Derivatives    Current Meds   1. Aspirin EC 81 MG Oral Tablet Delayed Release; TAKE 1 TABLET DAILY;   Therapy: 14Jan2015 to (Evaluate:06Mar2016)  Requested for: 12Mar2015; Last   Rx:12Mar2015 Ordered   2. Atorvastatin Calcium 40 MG Oral Tablet; TAKE 1 TABLET  daily for cholesterol;   Therapy: 24Sep2014 to (Evaluate:08Mar2018)  Requested for: 13Mar2017; Last   Rx:13Mar2017 Ordered   3. Dicyclomine HCl - 20 MG Oral Tablet; TAKE 1 TABLET 3 TIMES DAILY;   Therapy: 22Jun2017 to (Evaluate:20Oct2017)  Requested for: 22Jun2017; Last   Rx:22Jun2017 Ordered   4. DULoxetine HCl - 20 MG Oral Capsule Delayed Release Particles; TAKE 1 CAPSULE BY   MOUTH EVERY DAY;   Therapy: 19Jun2017 to (Evaluate:08Inw7968)  Requested for: 19Jun2017; Last   Rx:19Jun2017 Ordered   5. Ferrous Sulfate 325 (65 Fe) MG Oral Tablet; TAKE 1 TABLET 3 TIMES DAILY WITH FOOD;   Therapy: 24Sep2014 to (Evaluate:54Znh4873)  Requested for: 09Jun2017; Last   Rx:09Jun2017 Ordered   6. Latanoprost 0.005 % Ophthalmic Solution;   Therapy: 10Jun2014 to Recorded   7. Lisinopril 40 MG Oral Tablet; TAKE 1 TABLET DAILY;   Therapy: 05Nov2010 to (Evaluate:57Yyl1140)  Requested for: 25Apr2017; Last   Rx:80Dtn2021 Ordered   8. MetFORMIN HCl  MG Oral Tablet Extended Release 24 Hour; TAKE 1 TABLET   TWICE DAILY WITH BREAKFAST AND DINNER;   Therapy: 14Jan2015 to (Evaluate:48Bgx7666)  Requested for: 27Mar2017; Last   Rx:27Mar2017 Ordered   9. Metoprolol Tartrate 50 MG Oral Tablet; TAKE 1/2 TABLET TWICE DAILY;   Therapy: 06Jan2010 to  Requested for: 09Jun2017 Recorded   10. Omeprazole 40 MG Oral Capsule Delayed Release; TAKE 1 CAPSULE BY MOUTH    EVERY MORNING 30 MINS. BEFORE BREAKFAST;    Therapy: 84Ydq2575 to (Evaluate:16Jan2018) Recorded   11. Pioglitazone HCl - 30 MG Oral Tablet; TAKE 1  TABLET ONCE DAILY;    Therapy: 20Izo9052 to (Evaluate:52Kau7529)  Requested for: 04Eoz2275; Last    Rx:96Ssw7749 Ordered   12. True Metrix Air Glucose Meter w/Device Kit; USE  TO  TEST  BLOOD  SUGAR AS    DIRECTED;    Therapy: 66Fkz7281 to (Evaluate:07Pwp7839)  Requested for: 53Hhf7143; Last    Rx:18Hue6806 Ordered   13. True Metrix Blood Glucose Test In Vitro Strip; test one time daily;    Therapy: 26Oct2017 to (Evaluate:55Ixt6721)  Requested for: 26Oct2017; Last    Rx:39Clh7762 Ordered   14. Vitamin D (Ergocalciferol) 99702 UNIT Oral Capsule; TAKE 1 CAPSULE WEEKLY Take    as directed for 12 weeks;    Therapy: 10Nmd8971 to (Complete:05Apr2018)  Requested for: 37Hmv0857; Last    Rx:45Qdp4152 Ordered    Active Problems   Abnormal liver diagnostic imaging (R93.2)  Anemia (D64.9)  Benign hypertension with CKD (chronic kidney disease) stage III (I12.9,N18.3)  Cataract, right (H26.9)  Chronic major depressive disorder (F32.9)  CKD (chronic kidney disease), stage III (N18.3)  Constipation (K59.00)  Depression with anxiety (F41.8)  Diastolic dysfunction (I51.9)  Diverticulosis of colon (K57.30)  Dysphagia (R13.10)  Edema (R60.9)  Gastritis (K29.70)  Glaucoma (H40.9)  Hematochezia (K92.1)  Hematuria (R31.9)  Hyperlipidemia (E78.5)  Hypertension (I10)  Hypoglycemia (E16.2)  Internal hemorrhoids (K64.8)  Iron deficiency anemia (D50.9)  Irritable bowel (K58.9)  Joint pain, knee (M25.569)  Localized superficial swelling, mass, or lump (R22.9)  Lower abdominal pain (R10.30)  Memory loss (R41.3)  Need for immunization against influenza (Z23)  Need for pneumococcal vaccine (Z23)  Osteopenia (M85.80)  Parasitosis (B89)  Preop examination (Z01.818)  Pre-op testing (Z01.818)  Proteinuria (R80.9)  Recommendation refused by patient (Z53.20)  Vitamin D deficiency (E55.9)    Diabetes mellitus with kidney complication (250.40) (E11.29)          Past Medical History  History of Abdominal bloating (R14.0)  History of Abdominal discomfort  (R10.9)  History of Abdominal pain, unspecified location  Denied: History of Asthma  History of acute pyelonephritis (Z87.440)  History of contact dermatitis (Z87.2)  History of shortness of breath (Z87.898)  History of urinary tract infection (Z87.440)  History of Joint Pain, Localized In The Right Shoulder  History of Right foot pain (M79.671)  History of Shortness of breath on exertion (R06.02)    Surgical History  History of Appendectomy    Family History  Family History   Family history of Diabetes Mellitus    Social History  Denied: Alcohol  Never a smoker  Never smoker  Denied: Tobacco Use    Physical Exam  Constitutional: alert, in no acute distress, current vital signs reviewed and  chronically ill.   Head and Face: atraumatic, normocephalic.   Eyes: normal conjunctiva.   Pulmonary: no respiratory distress and normal respiratory rate and effort. breath sounds clear to auscultation bilaterally.   Cardiovascular: normal rate and regular rhythm.   Abdomen: soft and nontender.      Results/Data    09Nov2017 02:04PM   prc BLOOD SUGAR (ACCUCHECK), IN-OFFICE   GLUCOSE: 179     Immunizations  Influenza --- Series1: Temporarily Deferred: Patient reports item recently done; Series2: Temporarily  Deferred: Patient reports item recently done; Series3: 04-Nov-2009; Series4: 06-Oct-2010; Series5:  12-Oct-2011; Series6: 31-Oct-2012; Series7: 02-Oct-2013; Series8: 17-Dec-2014; Series9:  09-Oct-2015; Cqamrm23: 30-Oct-2017   PCV --- Series1: 20-Jul-2017     Assessment  Hypertension (I10)  Anemia (D64.9)  Iron deficiency anemia (D50.9)    Plan  Plans:     Plan:   iron deficiency anemia  -chronic, worsening  -pt not taking supplement  -discussed taking meds as prescribed  -repeat labs in 6w  -advised to go to ED for symptoms     HTN  -chronic, controlled   -continue present medications  -decrease metoprolol to lower dose      ..      Signatures   Electronically signed by : Ksenia Davila CMA; Nov 9 2017  2:01PM CST     Electronically signed by : VIBHA CUMMINGS M.D.; Nov 14 2017  5:05PM CST     5 (moderate pain) Statement Selected

## 2025-07-03 ENCOUNTER — APPOINTMENT (OUTPATIENT)
Dept: OPHTHALMOLOGY | Facility: CLINIC | Age: 80
End: 2025-07-03